# Patient Record
Sex: MALE | Race: WHITE | NOT HISPANIC OR LATINO | Employment: OTHER | URBAN - METROPOLITAN AREA
[De-identification: names, ages, dates, MRNs, and addresses within clinical notes are randomized per-mention and may not be internally consistent; named-entity substitution may affect disease eponyms.]

---

## 2017-03-01 ENCOUNTER — HOSPITAL ENCOUNTER (OUTPATIENT)
Dept: LAB | Facility: HOSPITAL | Age: 62
Discharge: HOME | End: 2017-03-01
Attending: INTERNAL MEDICINE | Admitting: INTERNAL MEDICINE

## 2017-03-01 LAB
ALBUMIN SERPL-MCNC: 4 G/DL (ref 3.5–4.8)
ALBUMIN/GLOB SERPL: 1.5 {RATIO} (ref 1–1.7)
ALP SERPL-CCNC: 47 IU/L (ref 32–91)
ALT SERPL-CCNC: 55 IU/L (ref 17–63)
ANION GAP SERPL CALC-SCNC: 18.1 MMOL/L (ref 10–20)
AST SERPL-CCNC: 29 IU/L (ref 15–41)
BILIRUB SERPL-MCNC: 0.6 MG/DL (ref 0.3–1.2)
BUN SERPL-MCNC: 15 MG/DL (ref 8–20)
BUN/CREAT SERPL: 12.5 (ref 6.2–20.3)
CALCIUM SERPL-MCNC: 9.4 MG/DL (ref 8.9–10.3)
CHLORIDE SERPL-SCNC: 101 MMOL/L (ref 101–111)
CHOLEST SERPL-MCNC: 171 MG/DL
CHOLEST/HDLC SERPL: 3.9 {RATIO}
CK SERPL-CCNC: 111 IU/L (ref 49–397)
CONV CO2: 26 MMOL/L (ref 22–32)
CONV LDL CHOLESTEROL DIRECT: 120 MG/DL (ref 0–100)
CONV TOTAL PROTEIN: 6.7 G/DL (ref 6.1–7.9)
CREAT UR-MCNC: 1.2 MG/DL (ref 0.7–1.2)
GLOBULIN UR ELPH-MCNC: 2.7 G/DL (ref 2.5–3.8)
GLUCOSE SERPL-MCNC: 103 MG/DL (ref 65–99)
HDLC SERPL-MCNC: 43 MG/DL
LDLC/HDLC SERPL: 2.8 {RATIO}
LIPID INTERPRETATION: ABNORMAL
POTASSIUM SERPL-SCNC: 5.1 MMOL/L (ref 3.6–5.1)
PSA SERPL-MCNC: 0.43 NG/ML (ref 0–4)
SODIUM SERPL-SCNC: 140 MMOL/L (ref 136–144)
TRIGL SERPL-MCNC: 92 MG/DL
VLDLC SERPL CALC-MCNC: 7.7 MG/DL

## 2018-10-05 ENCOUNTER — HOSPITAL ENCOUNTER (OUTPATIENT)
Dept: OTHER | Facility: HOSPITAL | Age: 63
Discharge: HOME | End: 2018-10-05
Attending: FAMILY MEDICINE | Admitting: FAMILY MEDICINE

## 2018-10-19 ENCOUNTER — CONVERSION ENCOUNTER (OUTPATIENT)
Dept: FAMILY MEDICINE CLINIC | Facility: CLINIC | Age: 63
End: 2018-10-19

## 2018-10-19 LAB
ALBUMIN SERPL-MCNC: 4 G/DL (ref 3.6–5.1)
ALP SERPL-CCNC: 51 U/L (ref 40–115)
AST SERPL-CCNC: 24 U/L (ref 10–35)
BILIRUB SERPL-MCNC: 0.7 MG/DL (ref 0.2–1.2)
BUN SERPL-MCNC: 18 MG/DL (ref 7–25)
BUN/CREAT SERPL: 15 (CALC) (ref 6–22)
CALCIUM SERPL-MCNC: 9 MG/DL (ref 8.6–10.2)
CHLORIDE SERPL-SCNC: 100 MMOL/L (ref 98–110)
CHOLEST SERPL-MCNC: 130 MG/DL (ref 125–200)
CONV CO2: 27 MMOL/L (ref 21–33)
CONV TOTAL PROTEIN: 6.7 G/DL (ref 6.2–8.3)
CREAT UR-MCNC: 1.2 MG/DL (ref 0.76–1.46)
GLOBULIN UR ELPH-MCNC: 2.7 MG/DL (ref 2.1–3.7)
GLUCOSE UR QL: 111 MG/DL (ref 65–99)
HDLC SERPL-MCNC: 40 MG/DL
INSULIN SERPL-ACNC: 1.5 (CALC) (ref 1–2.1)
LDLC SERPL CALC-MCNC: 71 MG/DL
POTASSIUM SERPL-SCNC: 4.7 MMOL/L (ref 3.5–5.3)
SODIUM SERPL-SCNC: 137 MMOL/L (ref 135–146)
TRIGL SERPL-MCNC: 97 MG/DL

## 2019-02-05 ENCOUNTER — HOSPITAL ENCOUNTER (OUTPATIENT)
Dept: GENERAL RADIOLOGY | Facility: HOSPITAL | Age: 64
Discharge: HOME | End: 2019-02-05
Attending: NEUROLOGICAL SURGERY | Admitting: NEUROLOGICAL SURGERY

## 2019-02-13 ENCOUNTER — HOSPITAL ENCOUNTER (OUTPATIENT)
Dept: PHYSICAL THERAPY | Facility: HOSPITAL | Age: 64
Setting detail: RECURRING SERIES
Discharge: STILL ADMITTED OR ADMINISTRATIVE | End: 2019-03-12
Attending: NEUROLOGICAL SURGERY | Admitting: NEUROLOGICAL SURGERY

## 2019-03-14 ENCOUNTER — HOSPITAL ENCOUNTER (OUTPATIENT)
Dept: CARDIOLOGY | Facility: HOSPITAL | Age: 64
Discharge: HOME | End: 2019-03-14
Attending: INTERNAL MEDICINE | Admitting: INTERNAL MEDICINE

## 2019-04-05 ENCOUNTER — HOSPITAL ENCOUNTER (OUTPATIENT)
Dept: OTHER | Facility: HOSPITAL | Age: 64
Discharge: HOME | End: 2019-04-05
Attending: FAMILY MEDICINE | Admitting: FAMILY MEDICINE

## 2019-04-10 ENCOUNTER — HOSPITAL ENCOUNTER (OUTPATIENT)
Dept: PHYSICAL THERAPY | Facility: HOSPITAL | Age: 64
Setting detail: RECURRING SERIES
Discharge: HOME | End: 2019-06-10
Attending: NEUROLOGICAL SURGERY | Admitting: NEUROLOGICAL SURGERY

## 2019-05-16 ENCOUNTER — ON CAMPUS - OUTPATIENT (AMBULATORY)
Dept: URBAN - METROPOLITAN AREA HOSPITAL 2 | Facility: HOSPITAL | Age: 64
End: 2019-05-16
Payer: COMMERCIAL

## 2019-05-16 VITALS
SYSTOLIC BLOOD PRESSURE: 126 MMHG | OXYGEN SATURATION: 98 % | HEART RATE: 75 BPM | RESPIRATION RATE: 17 BRPM | SYSTOLIC BLOOD PRESSURE: 148 MMHG | SYSTOLIC BLOOD PRESSURE: 144 MMHG | HEART RATE: 83 BPM | OXYGEN SATURATION: 95 % | SYSTOLIC BLOOD PRESSURE: 130 MMHG | HEART RATE: 85 BPM | DIASTOLIC BLOOD PRESSURE: 79 MMHG | DIASTOLIC BLOOD PRESSURE: 92 MMHG | HEART RATE: 80 BPM | DIASTOLIC BLOOD PRESSURE: 93 MMHG | OXYGEN SATURATION: 97 % | SYSTOLIC BLOOD PRESSURE: 153 MMHG | DIASTOLIC BLOOD PRESSURE: 82 MMHG | DIASTOLIC BLOOD PRESSURE: 72 MMHG | DIASTOLIC BLOOD PRESSURE: 90 MMHG | DIASTOLIC BLOOD PRESSURE: 88 MMHG | RESPIRATION RATE: 12 BRPM | HEART RATE: 94 BPM | WEIGHT: 253 LBS | SYSTOLIC BLOOD PRESSURE: 125 MMHG | OXYGEN SATURATION: 92 % | RESPIRATION RATE: 18 BRPM | DIASTOLIC BLOOD PRESSURE: 87 MMHG | OXYGEN SATURATION: 96 % | HEART RATE: 90 BPM | TEMPERATURE: 97.5 F | SYSTOLIC BLOOD PRESSURE: 141 MMHG | HEART RATE: 81 BPM | SYSTOLIC BLOOD PRESSURE: 152 MMHG | SYSTOLIC BLOOD PRESSURE: 134 MMHG | OXYGEN SATURATION: 94 % | DIASTOLIC BLOOD PRESSURE: 83 MMHG | HEIGHT: 67 IN | RESPIRATION RATE: 16 BRPM

## 2019-05-16 DIAGNOSIS — R13.10 DYSPHAGIA, UNSPECIFIED: ICD-10-CM

## 2019-05-16 DIAGNOSIS — K57.30 DIVERTICULOSIS OF LARGE INTESTINE WITHOUT PERFORATION OR ABS: ICD-10-CM

## 2019-05-16 DIAGNOSIS — Z12.11 ENCOUNTER FOR SCREENING FOR MALIGNANT NEOPLASM OF COLON: ICD-10-CM

## 2019-05-16 DIAGNOSIS — K21.9 GASTRO-ESOPHAGEAL REFLUX DISEASE WITHOUT ESOPHAGITIS: ICD-10-CM

## 2019-05-16 PROCEDURE — 43235 EGD DIAGNOSTIC BRUSH WASH: CPT | Performed by: INTERNAL MEDICINE

## 2019-05-16 PROCEDURE — 43450 DILATE ESOPHAGUS 1/MULT PASS: CPT | Performed by: INTERNAL MEDICINE

## 2019-05-16 PROCEDURE — 45378 DIAGNOSTIC COLONOSCOPY: CPT | Performed by: INTERNAL MEDICINE

## 2019-11-29 DIAGNOSIS — I10 HYPERTENSION, UNSPECIFIED TYPE: Primary | ICD-10-CM

## 2019-12-04 RX ORDER — CARVEDILOL 25 MG/1
TABLET ORAL
Qty: 180 TABLET | Refills: 0 | Status: SHIPPED | OUTPATIENT
Start: 2019-12-04 | End: 2019-12-06 | Stop reason: SDUPTHER

## 2019-12-05 DIAGNOSIS — I10 HYPERTENSION, UNSPECIFIED TYPE: ICD-10-CM

## 2019-12-05 DIAGNOSIS — I10 HTN (HYPERTENSION), BENIGN: Primary | ICD-10-CM

## 2019-12-05 NOTE — TELEPHONE ENCOUNTER
Needs a refill on carvedilol 25 mg and enalapril 5 mg to be sent to [facility] in [place].

## 2019-12-06 PROBLEM — J30.9 ALLERGIC RHINITIS: Status: ACTIVE | Noted: 2019-12-06

## 2019-12-06 PROBLEM — I21.9: Status: ACTIVE | Noted: 2019-12-06

## 2019-12-06 PROBLEM — H81.90 VERTIGINOUS SYNDROME AND LABYRINTHINE DISORDER: Status: ACTIVE | Noted: 2019-12-06

## 2019-12-06 PROBLEM — D22.9 NEVUS: Status: ACTIVE | Noted: 2019-12-06

## 2019-12-06 PROBLEM — Z12.11 COLON CANCER SCREENING: Status: ACTIVE | Noted: 2019-12-06

## 2019-12-06 PROBLEM — Z87.891 HISTORY OF TOBACCO USE: Status: ACTIVE | Noted: 2019-12-06

## 2019-12-06 PROBLEM — I10 HTN (HYPERTENSION), BENIGN: Status: ACTIVE | Noted: 2019-12-06

## 2019-12-06 PROBLEM — E66.3 OVERWEIGHT: Status: ACTIVE | Noted: 2019-12-06

## 2019-12-06 PROBLEM — Z98.61 CORONARY ANGIOPLASTY STATUS: Status: ACTIVE | Noted: 2019-12-06

## 2019-12-06 PROBLEM — M79.10 MYALGIA: Status: ACTIVE | Noted: 2019-12-06

## 2019-12-06 PROBLEM — M51.9 LUMBAR DISC DISEASE: Status: ACTIVE | Noted: 2019-12-06

## 2019-12-06 PROBLEM — K21.9 GERD (GASTROESOPHAGEAL REFLUX DISEASE): Status: ACTIVE | Noted: 2019-12-06

## 2019-12-06 PROBLEM — M54.41 ACUTE RIGHT-SIDED LOW BACK PAIN WITH RIGHT-SIDED SCIATICA: Status: ACTIVE | Noted: 2019-12-06

## 2019-12-06 PROBLEM — Z00.01 ENCOUNTER FOR ANNUAL GENERAL MEDICAL EXAMINATION WITH ABNORMAL FINDINGS IN ADULT: Status: ACTIVE | Noted: 2019-12-06

## 2019-12-06 PROBLEM — M25.551 RIGHT HIP PAIN: Status: ACTIVE | Noted: 2019-12-06

## 2019-12-06 PROBLEM — E78.5 HYPERLIPIDEMIA: Status: ACTIVE | Noted: 2019-12-06

## 2019-12-06 PROBLEM — B19.10 HEPATITIS B: Status: ACTIVE | Noted: 2019-12-06

## 2019-12-06 PROBLEM — Z12.5 SCREENING PSA (PROSTATE SPECIFIC ANTIGEN): Status: ACTIVE | Noted: 2019-12-06

## 2019-12-06 PROBLEM — R60.9 EDEMA: Status: ACTIVE | Noted: 2017-03-02

## 2019-12-06 PROBLEM — E55.9 VITAMIN D DEFICIENCY: Status: ACTIVE | Noted: 2019-12-06

## 2019-12-06 PROBLEM — G47.33 OBSTRUCTIVE SLEEP APNEA: Status: ACTIVE | Noted: 2019-12-06

## 2019-12-06 PROBLEM — B02.9 HERPES ZOSTER: Status: ACTIVE | Noted: 2019-12-06

## 2019-12-06 PROBLEM — T14.8XXA HEMATOMA: Status: ACTIVE | Noted: 2019-12-06

## 2019-12-06 RX ORDER — ENALAPRIL MALEATE 5 MG/1
TABLET ORAL
COMMUNITY
Start: 2018-09-13 | End: 2019-12-06 | Stop reason: SDUPTHER

## 2019-12-09 DIAGNOSIS — I10 HYPERTENSION, UNSPECIFIED TYPE: ICD-10-CM

## 2019-12-09 RX ORDER — CARVEDILOL 25 MG/1
25 TABLET ORAL 2 TIMES DAILY
Qty: 180 TABLET | Refills: 0 | Status: SHIPPED | OUTPATIENT
Start: 2019-12-09 | End: 2020-01-27 | Stop reason: SDUPTHER

## 2019-12-09 RX ORDER — ENALAPRIL MALEATE 5 MG/1
5 TABLET ORAL DAILY
Qty: 90 TABLET | Refills: 0 | Status: SHIPPED | OUTPATIENT
Start: 2019-12-09 | End: 2019-12-16

## 2019-12-09 RX ORDER — ENALAPRIL MALEATE 5 MG/1
5 TABLET ORAL DAILY
Qty: 30 TABLET | Refills: 0 | Status: SHIPPED | OUTPATIENT
Start: 2019-12-09 | End: 2019-12-09 | Stop reason: SDUPTHER

## 2019-12-09 RX ORDER — ENALAPRIL MALEATE 5 MG/1
5 TABLET ORAL 2 TIMES DAILY
Qty: 180 TABLET | Refills: 0 | Status: SHIPPED | OUTPATIENT
Start: 2019-12-09 | End: 2019-12-16

## 2019-12-16 ENCOUNTER — OFFICE VISIT (OUTPATIENT)
Dept: CARDIOLOGY | Facility: CLINIC | Age: 64
End: 2019-12-16

## 2019-12-16 VITALS
OXYGEN SATURATION: 98 % | BODY MASS INDEX: 42.33 KG/M2 | HEIGHT: 66 IN | SYSTOLIC BLOOD PRESSURE: 118 MMHG | DIASTOLIC BLOOD PRESSURE: 70 MMHG | WEIGHT: 263.4 LBS | HEART RATE: 70 BPM

## 2019-12-16 DIAGNOSIS — I10 ESSENTIAL HYPERTENSION: ICD-10-CM

## 2019-12-16 DIAGNOSIS — I25.10 CAD S/P PERCUTANEOUS CORONARY ANGIOPLASTY: Primary | ICD-10-CM

## 2019-12-16 DIAGNOSIS — Z98.61 CAD S/P PERCUTANEOUS CORONARY ANGIOPLASTY: Primary | ICD-10-CM

## 2019-12-16 DIAGNOSIS — E78.5 DYSLIPIDEMIA: ICD-10-CM

## 2019-12-16 PROCEDURE — 93000 ELECTROCARDIOGRAM COMPLETE: CPT | Performed by: INTERNAL MEDICINE

## 2019-12-16 PROCEDURE — 99213 OFFICE O/P EST LOW 20 MIN: CPT | Performed by: INTERNAL MEDICINE

## 2019-12-16 RX ORDER — CHOLECALCIFEROL (VITAMIN D3) 50 MCG
1 TABLET ORAL 2 TIMES DAILY
COMMUNITY
Start: 2015-08-11

## 2019-12-16 RX ORDER — ROSUVASTATIN CALCIUM 40 MG/1
1 TABLET, COATED ORAL EVERY 24 HOURS
COMMUNITY
Start: 2015-08-11 | End: 2020-01-27 | Stop reason: SDUPTHER

## 2019-12-16 RX ORDER — NITROGLYCERIN 0.4 MG/1
1 TABLET SUBLINGUAL AS NEEDED
COMMUNITY
Start: 2015-08-11 | End: 2020-01-27 | Stop reason: ALTCHOICE

## 2019-12-16 RX ORDER — CYCLOBENZAPRINE HCL 10 MG
1 TABLET ORAL AS NEEDED
COMMUNITY
Start: 2018-12-10 | End: 2020-01-27 | Stop reason: SDUPTHER

## 2019-12-16 RX ORDER — OFLOXACIN 3 MG/ML
1 SOLUTION/ DROPS OPHTHALMIC AS NEEDED
Refills: 0 | COMMUNITY
Start: 2019-10-15 | End: 2020-08-07

## 2019-12-16 RX ORDER — ASPIRIN 325 MG
1 TABLET ORAL DAILY
COMMUNITY
Start: 2015-08-11 | End: 2023-01-19 | Stop reason: SDUPTHER

## 2019-12-16 NOTE — PROGRESS NOTES
Subjective:     Encounter Date:12/16/2019      Patient ID: John Wilburn is a 64 y.o. male.    Chief Complaint: 1 yr follow-up CAD  History of Present Illness      This is a 64-year with PMH of    #  CAD,h/o MI, multiple PCIs in [place] in the past, NSTEMI and MIGUEL to RCA 6/18/15 and LCX 06/24/2015, cath 7/8/2015 Patent stents in RCA and LCX,Borderline disease in ramus intermedius and moderate disease in proximal LAD    #.  Hypertension,  #  dyslipidemia,  Lipitor intolerant  #.  PLAVIX  RESISTANT  #.  obesity, hyperglycemia with normal hemoglobin A1c, 5.6 on  7/2015  #.   Former smoker quit in 1990  #    positive family history of heart disease in father and mother  #    allergy/intolerance  to Lipitor Darvocet and Vicodin    Here for  follow-up. denies any chest pain shortness of breath lightheadedness dizziness loss of consciousness.  Patient is complaining of fatigue with no aggravating or relieving factors and left arm numbness.  No aggravating or relieving factor.  Patient  had labs done 10/19/2018 which showed cholesterol 130 triglycerides 77 HDL 40 LDL 71 CMP was unremarkable. Labs from 4/5/2019 revealed normal CMP TSH 3.74 cholesterol 132 HDL 45 LDL 67 triglycerides 114    Patient's arterial blood pressure is 118/70, heart rate 70 bpm, O2 sat of 98% on room air.  Denies any blurred vision headaches.         ASSESSMENT:  # dyslipidemia  # edema  #  CAD history of MI and PCI  #  obesity, glucose intolerance,   # hypertension,      PLAN:  Reviewed EKG results with patient  Will continue statin to Crestor 10 mg   reheck labs  including lipid profile, CK, AST before visit.  Reviewed labs with patient   reviewed risk benefits alternatives of  medications patient is taking   counseled on diet exercise weight loss        Assessment:          Diagnosis Plan   1. CAD S/P percutaneous coronary angioplasty  Lipid Panel    Comprehensive Metabolic Panel    CK   2. Essential hypertension  Lipid Panel    Comprehensive  Metabolic Panel    CK   3. Dyslipidemia  Lipid Panel    Comprehensive Metabolic Panel    CK          Plan:         Past Medical History:  Past Medical History:   Diagnosis Date   • Acute bronchitis    • Acute myocardial infarction, subsequent episode of care (CMS/[facility])    • Acute non-recurrent frontal sinusitis    • Acute right-sided low back pain with right-sided sciatica     Impression: persistent, long standing back pain, worse, with rle radiculopathy, h/o vertebral fx, refractory to pt xray with severe degenerative changes l5/ s1 check mri, referrall to dr amarilys arias 20 minutes bid nsaids Rehabilitation exercises discussed. continue pt   • Allergic rhinitis    • Allergy to poison ivy     Impression: Due to the wide spread rash he was started on Prednisone per pt request. He was advised to RTC if his symptoms did not improve.   • Annual visit for general adult medical examination with abnormal findings     Impression: doing well, vaccines current Age specific anticipatory guidance and warning signs discussed. Diet, exercise, and lifestyle modification discussed. Safety, seatbelts, and routine screening examinations discussed. Discussed self-examinations.   • Bronchitis    • Cellulitis of buttock     Impression: possibly 2nd insect bite Topical care discussed. Discussed possible necessity of I and D. Call / return if fever, worsening symptoms.   • Chest pain     Impression: atypical, improved / resolved currently possibly 2nd nausea /gi upset /viral uri serial ekg's, troponin's normal followed by cardiology in [place], he reports negative treadmill cardiolyte 12/11 d/c to home, Follow up 2 to 3 days. Follow up with cardiology planned consider repeat stress test if chest pain on exertion, worsening symptoms.   • Colon cancer screening     Impression: has a colonoscopy, will get records   • Conjunctivitis     Impression: viral Topical care discussed. wash hands frequently   • Coronary atherosclerosis     Impression:  h/o stent 2003, 2016 followed by meng, had recent neg stress test, will get records h/o normal carptid doppler per her report, will get records continue coreg, statin, effient continue risk factor reduction recommend cardiology Follow up he states h3 will consider   • GERD (gastroesophageal reflux disease)    • Hematoma     Impression: hand, much improved xray neg for fracture per ed Signs and symptoms of infecton discussed. Call / return if worsening symptoms.   • Hepatitis B     Impression: h/o, recheck levels   • Herpes zoster     Impression: topical care discussed cover with antibiotics Follow up for zostavax   • History of tobacco use    • HTN (hypertension), benign     Impression: good control Discussed low sodium diet, lifestyle modification. Follow up recheck   • Hyperlipidemia     Impression: followed by Roxana improved tolerating crestor rx, ldl to 71, + aggressive ldl target considering cad recheck   • Lumbar disc disease     Impression: followed by Nick Ashton undergoing epidual injxn   • Malaise and fatigue     Impression: check vitamin levels h/o low t do not recommend replacement consider age enedelia under good cobtrol consider wellbutrin Follow up recheck Call / return if worsening symptoms.   • Myalgia     Impression: possibly 2nd crestor, hold x 1 to 2 mos risk/benefit RX discussed, considering restart possibly at lower dose   • Nevus     Impression: path shows benign lentigo Discussed skin care, use of sun block and protective clothing.   • Obesity    • Obesity, morbid, BMI 40.0-49.9 (CMS/HCC)     DOCUMENTATION OF FOLLOW-UP PLAN FOR PATIENT WITH BMI ABOVE NORMAL ()   • Overweight (BMI 25.0-29.9)     Impression: Discussed diet, exercise, lifestyle modification. Follow up [id number]   • Pruritus    • Right hip pain     Impression: check xray   • Screening for depression     Negative Depression Screening (4 or less) ()   • Screening PSA (prostate specific antigen)     Impression: psa normal  / damián normal   • Sinusitis, bacterial     Impression: He was prescribed Cipro and Tessalon perles. Increase fluids. Tylenol/motrin for pain or fever. Medication and medication adverse effects discussed. Follow-up 5-7 days for reevaluation if not improved or sooner if needed.   • Skin lesion     Impression: rec resection / derm ref sched   • Sleep apnea, obstructive     Story: on CPAP   • URI (upper respiratory infection)     Impression: Increase fluid intake. Mucinex Call / return if fever, respiratory difficulty, worsening symptoms.   • Vertiginous syndrome and labyrinthine disorder     Impression: Differential diagnosis discussed. Follow-up in 2 weeks if not better. Follow-up sooner for worsening symptoms or for any concerns. Zyrtec as directed.   • Vertigo     Impression: likely secondary to sinusitis with eustachian tube dysfunction, rx in progress ddx includes vestibular neuritis, cover with prednisone Follow up recheck Consider imaging if persistent symptoms.   • Vitamin D deficiency      Past Surgical History:  Past Surgical History:   Procedure Laterality Date   • CORONARY STENT PLACEMENT  2003      Allergies:  Allergies   Allergen Reactions   • Atorvastatin Hives   • Propoxyphene Hives     Home Meds:  Current Meds:     Current Outpatient Medications:   •  aspirin 325 MG tablet, Take 1 tablet by mouth Daily., Disp: , Rfl:   •  carvedilol (COREG) 25 MG tablet, Take 1 tablet by mouth 2 (Two) Times a Day., Disp: 180 tablet, Rfl: 0  •  Cholecalciferol (VITAMIN D) 50 MCG (2000 UT) tablet, Take 1 tablet by mouth Daily., Disp: , Rfl:   •  cyclobenzaprine (FLEXERIL) 10 MG tablet, Take 1 tablet by mouth As Needed., Disp: , Rfl:   •  nitroglycerin (NITROSTAT) 0.4 MG SL tablet, Place 1 tablet under the tongue As Needed., Disp: , Rfl:   •  ofloxacin (OCUFLOX) 0.3 % ophthalmic solution, Administer 1 drop into the left eye As Needed., Disp: , Rfl: 0  •  rosuvastatin (CRESTOR) 40 MG tablet, Take 1 tablet by mouth Daily.,  Disp: , Rfl:   Social History:   Social History     Tobacco Use   • Smoking status: Former Smoker     Last attempt to quit: 1990     Years since quittin.9   • Smokeless tobacco: Never Used   Substance Use Topics   • Alcohol use: Yes     Comment: Occasional      Family History:  Family History   Problem Relation Age of Onset   • Hypertension Mother    • Diabetes Mother    • Heart attack Father    • No Known Problems Sister    • No Known Problems Brother    • No Known Problems Maternal Aunt    • No Known Problems Maternal Uncle    • No Known Problems Paternal Aunt    • No Known Problems Paternal Uncle    • No Known Problems Maternal Grandmother    • No Known Problems Maternal Grandfather    • No Known Problems Paternal Grandmother    • No Known Problems Paternal Grandfather    • No Known Problems Other    • Anemia Neg Hx    • Arrhythmia Neg Hx    • Asthma Neg Hx    • Clotting disorder Neg Hx    • Fainting Neg Hx    • Heart disease Neg Hx    • Heart failure Neg Hx    • Hyperlipidemia Neg Hx         The following portions of the patient's history were reviewed and updated as appropriate: allergies, current medications, past family history, past medical history, past social history, past surgical history and problem list.    Review of Systems   Constitution: Negative for fever and malaise/fatigue.   HENT: Negative for congestion and hearing loss.    Eyes: Negative for double vision and visual disturbance.   Cardiovascular: Negative for chest pain, claudication, dyspnea on exertion, leg swelling and syncope.   Respiratory: Negative for cough and shortness of breath.    Endocrine: Negative for cold intolerance.   Skin: Negative for color change and rash.   Musculoskeletal: Negative for arthritis and joint pain.   Gastrointestinal: Negative for abdominal pain and heartburn.   Genitourinary: Negative for hematuria.   Neurological: Negative for excessive daytime sleepiness and dizziness.   Psychiatric/Behavioral:  "Negative for depression. The patient is not nervous/anxious.    All other systems reviewed and are negative.        ECG 12 Lead  Date/Time: 12/16/2019 12:53 PM  Performed by: Sigifredo Schmidt MD  Authorized by: Sigifredo Schmidt MD   Comparison: compared with previous ECG from 12/19/2018  Comparison to previous ECG: EKG done today reviewed by me shows sinus rhythm with rate of 70 bpm with old inferior wall MI unchanged from previous EKG from 12/19/2018                 Objective:     Physical Exam  /70 (BP Location: Left arm, Patient Position: Sitting, Cuff Size: Adult)   Pulse 70   Ht 167.6 cm (66\")   Wt 119 kg (263 lb 6.4 oz)   SpO2 98%   BMI 42.51 kg/m²   General:  Appears in no acute distress  Eyes: Sclera is anicteric,  conjunctiva is clear   HEENT:  No JVD. Thyroid not visibly enlarged. No mucosal pallor or cyanosis  Respiratory: Respirations regular and unlabored at rest.  Bilaterally good breath sounds, with good air entry in all fields. No crackles, rubs or wheezes auscultated  Cardiovascular: S1,S2 Regular rate and rhythm. No murmur, rub or gallop auscultated. No pretibial pitting edema  Gastrointestinal: Abdomen soft, flat, non tender. Bowel sounds present.   Musculoskeletal:  No abnormal movements  Extremities: No digital clubbing or cyanosis  Skin: Color pink. Skin warm and dry to touch. No rashes  No xanthoma  Neuro: Alert and awake, no lateralizing deficits appreciated    Lab Reviewed:                  "

## 2020-01-17 DIAGNOSIS — I10 HTN (HYPERTENSION), BENIGN: Primary | ICD-10-CM

## 2020-01-17 DIAGNOSIS — I10 HTN (HYPERTENSION), BENIGN: ICD-10-CM

## 2020-01-17 RX ORDER — ENALAPRIL MALEATE 5 MG/1
5 TABLET ORAL 2 TIMES DAILY
Qty: 60 TABLET | Refills: 0 | Status: SHIPPED | OUTPATIENT
Start: 2020-01-17 | End: 2020-01-27 | Stop reason: SDUPTHER

## 2020-01-20 RX ORDER — ENALAPRIL MALEATE 5 MG/1
TABLET ORAL
Qty: 180 TABLET | OUTPATIENT
Start: 2020-01-20

## 2020-01-27 ENCOUNTER — OFFICE VISIT (OUTPATIENT)
Dept: FAMILY MEDICINE CLINIC | Facility: CLINIC | Age: 65
End: 2020-01-27

## 2020-01-27 VITALS
BODY MASS INDEX: 42.3 KG/M2 | SYSTOLIC BLOOD PRESSURE: 128 MMHG | HEART RATE: 82 BPM | HEIGHT: 66 IN | RESPIRATION RATE: 19 BRPM | OXYGEN SATURATION: 95 % | TEMPERATURE: 98.6 F | DIASTOLIC BLOOD PRESSURE: 80 MMHG | WEIGHT: 263.2 LBS

## 2020-01-27 DIAGNOSIS — I10 HYPERTENSION, UNSPECIFIED TYPE: ICD-10-CM

## 2020-01-27 DIAGNOSIS — Z12.11 COLON CANCER SCREENING: ICD-10-CM

## 2020-01-27 DIAGNOSIS — G47.33 OBSTRUCTIVE SLEEP APNEA: ICD-10-CM

## 2020-01-27 DIAGNOSIS — E66.3 OVERWEIGHT: ICD-10-CM

## 2020-01-27 DIAGNOSIS — Z12.5 SCREENING PSA (PROSTATE SPECIFIC ANTIGEN): ICD-10-CM

## 2020-01-27 DIAGNOSIS — Z87.891 HISTORY OF TOBACCO USE: ICD-10-CM

## 2020-01-27 DIAGNOSIS — M54.41 ACUTE RIGHT-SIDED LOW BACK PAIN WITH RIGHT-SIDED SCIATICA: ICD-10-CM

## 2020-01-27 DIAGNOSIS — R07.9 CHEST PAIN, UNSPECIFIED TYPE: ICD-10-CM

## 2020-01-27 DIAGNOSIS — Z00.01 ENCOUNTER FOR ANNUAL GENERAL MEDICAL EXAMINATION WITH ABNORMAL FINDINGS IN ADULT: Primary | ICD-10-CM

## 2020-01-27 DIAGNOSIS — E78.2 MIXED HYPERLIPIDEMIA: ICD-10-CM

## 2020-01-27 DIAGNOSIS — I10 HTN (HYPERTENSION), BENIGN: ICD-10-CM

## 2020-01-27 PROCEDURE — 99213 OFFICE O/P EST LOW 20 MIN: CPT | Performed by: FAMILY MEDICINE

## 2020-01-27 PROCEDURE — 99386 PREV VISIT NEW AGE 40-64: CPT | Performed by: FAMILY MEDICINE

## 2020-01-27 RX ORDER — NITROGLYCERIN 400 UG/1
1 SPRAY ORAL
COMMUNITY
End: 2020-01-27 | Stop reason: ALTCHOICE

## 2020-01-27 RX ORDER — ROSUVASTATIN CALCIUM 20 MG/1
TABLET, COATED ORAL
COMMUNITY
Start: 2019-12-30 | End: 2020-01-27 | Stop reason: SDUPTHER

## 2020-01-27 RX ORDER — CARVEDILOL 25 MG/1
25 TABLET ORAL 2 TIMES DAILY
Qty: 180 TABLET | Refills: 2 | Status: SHIPPED | OUTPATIENT
Start: 2020-01-27 | End: 2020-10-28

## 2020-01-27 RX ORDER — CYCLOBENZAPRINE HCL 10 MG
10 TABLET ORAL NIGHTLY PRN
Qty: 90 TABLET | Refills: 2 | Status: SHIPPED | OUTPATIENT
Start: 2020-01-27 | End: 2020-11-04

## 2020-01-27 RX ORDER — ROSUVASTATIN CALCIUM 20 MG/1
20 TABLET, COATED ORAL DAILY
Qty: 90 TABLET | Refills: 2 | Status: SHIPPED | OUTPATIENT
Start: 2020-01-27 | End: 2020-10-26

## 2020-01-27 RX ORDER — ENALAPRIL MALEATE 5 MG/1
5 TABLET ORAL 2 TIMES DAILY
Qty: 180 TABLET | Refills: 2 | Status: SHIPPED | OUTPATIENT
Start: 2020-01-27 | End: 2020-11-16

## 2020-01-27 RX ORDER — NITROGLYCERIN 400 UG/1
1 SPRAY ORAL
Qty: 1 EACH | Refills: 1 | Status: SHIPPED | OUTPATIENT
Start: 2020-01-27 | End: 2021-07-02 | Stop reason: SDUPTHER

## 2020-01-27 NOTE — PROGRESS NOTES
Subjective   John Wilburn is a 64 y.o. male.     Chief Complaint   Patient presents with   • Annual Exam   • Hypertension   • Hyperlipidemia       The patient is here: to discuss health maintenance and disease prevention to follow up on multiple medical problems.  Last comprehensive physical was on 4/12/2019.  Previous physical was performed by  Kian Weston MD  Overall has: minimal activity with work/home activities, good appetite, feels well with minor complaints, decreased energy level and is sleeping well. Nutrition: balanced diet and eating a variety of foods. Last tetanus shot was less than 5 years ago. Patient's last colonoscopy was: 5/16/2019. Patient's last PSA was: 4/5/2019 result 0.3 Patient's last stress test was: 3/21/2019 with     Hyperlipidemia   This is a chronic problem. The current episode started more than 1 year ago. Pertinent negatives include no chest pain, focal weakness, myalgias or shortness of breath. Current antihyperlipidemic treatment includes statins. The current treatment provides mild improvement of lipids. There are no compliance problems.  Risk factors for coronary artery disease include dyslipidemia, hypertension, male sex and obesity.   Hypertension   This is a chronic problem. The current episode started more than 1 year ago. The problem is unchanged. Pertinent negatives include no anxiety, chest pain, neck pain, palpitations, shortness of breath or sweats. Risk factors for coronary artery disease include male gender and dyslipidemia. Current antihypertension treatment includes beta blockers and ACE inhibitors. The current treatment provides moderate improvement. There are no compliance problems.         Recent Hospitalizations:  No hospitalization(s) within the last year..  ccc      I personally reviewed and updated the patient's allergies, medications, problem list, and past medical, surgical, social, and family history.     Family History   Problem Relation Age  of Onset   • Hypertension Mother    • Diabetes Mother    • Heart attack Father    • No Known Problems Sister    • No Known Problems Brother    • No Known Problems Maternal Aunt    • No Known Problems Maternal Uncle    • No Known Problems Paternal Aunt    • No Known Problems Paternal Uncle    • No Known Problems Maternal Grandmother    • No Known Problems Maternal Grandfather    • No Known Problems Paternal Grandmother    • No Known Problems Paternal Grandfather    • No Known Problems Other    • Anemia Neg Hx    • Arrhythmia Neg Hx    • Asthma Neg Hx    • Clotting disorder Neg Hx    • Fainting Neg Hx    • Heart disease Neg Hx    • Heart failure Neg Hx    • Hyperlipidemia Neg Hx        Social History     Tobacco Use   • Smoking status: Former Smoker     Last attempt to quit: 1990     Years since quittin.0   • Smokeless tobacco: Never Used   Substance Use Topics   • Alcohol use: Yes     Comment: Occasional   • Drug use: No       Past Surgical History:   Procedure Laterality Date   • CORONARY STENT PLACEMENT     • EYE SURGERY  10/16/2019    repair detached retina       Patient Active Problem List   Diagnosis   • Acute myocardial infarction, subsequent episode of care (CMS/[facility])   • Acute right-sided low back pain with right-sided sciatica   • Allergic rhinitis   • Coronary angioplasty status   • Coronary atherosclerosis   • Edema   • Encounter for annual general medical examination with abnormal findings in adult   • Colon cancer screening   • Screening PSA (prostate specific antigen)   • GERD (gastroesophageal reflux disease)   • Hepatitis B   • Herpes zoster   • History of tobacco use   • HTN (hypertension), benign   • Hyperlipidemia   • Lumbar disc disease   • Hematoma   • Vertiginous syndrome and labyrinthine disorder   • Myalgia   • Nevus   • Obstructive sleep apnea   • Overweight   • Right hip pain   • Vitamin D deficiency         Current Outpatient Medications:   •  aspirin 325 MG tablet, Take 1  "tablet by mouth Daily., Disp: , Rfl:   •  carvedilol (COREG) 25 MG tablet, Take 1 tablet by mouth 2 (Two) Times a Day., Disp: 180 tablet, Rfl: 2  •  Cholecalciferol (VITAMIN D) 50 MCG (2000 UT) tablet, Take 1 tablet by mouth Daily., Disp: , Rfl:   •  cyclobenzaprine (FLEXERIL) 10 MG tablet, Take 1 tablet by mouth At Night As Needed for Muscle Spasms., Disp: 90 tablet, Rfl: 2  •  enalapril (VASOTEC) 5 MG tablet, Take 1 tablet by mouth 2 (Two) Times a Day., Disp: 180 tablet, Rfl: 2  •  nitroglycerin (NITROLINGUAL) 0.4 MG/SPRAY spray, Place 1 spray under the tongue Every 5 (Five) Minutes As Needed for Chest Pain., Disp: 1 each, Rfl: 1  •  ofloxacin (OCUFLOX) 0.3 % ophthalmic solution, Administer 1 drop into the left eye As Needed., Disp: , Rfl: 0  •  rosuvastatin (CRESTOR) 20 MG tablet, Take 1 tablet by mouth Daily., Disp: 90 tablet, Rfl: 2         Review of Systems   Constitutional: Negative for chills and diaphoresis.   HENT: Negative for trouble swallowing and voice change.    Eyes: Negative for visual disturbance.   Respiratory: Negative for shortness of breath.    Cardiovascular: Negative for chest pain and palpitations.   Gastrointestinal: Negative for abdominal pain and nausea.   Endocrine: Negative for polydipsia and polyphagia.   Genitourinary: Negative for hematuria.   Musculoskeletal: Negative for myalgias, neck pain and neck stiffness.   Skin: Negative for color change and pallor.   Allergic/Immunologic: Negative for immunocompromised state.   Neurological: Negative for focal weakness, seizures and syncope.   Hematological: Negative for adenopathy.   Psychiatric/Behavioral: Negative for sleep disturbance and suicidal ideas.       Objective   /80   Pulse 82   Temp 98.6 °F (37 °C)   Resp 19   Ht 167.6 cm (66\")   Wt 119 kg (263 lb 3.2 oz)   SpO2 95%   BMI 42.48 kg/m²   Wt Readings from Last 3 Encounters:   01/27/20 119 kg (263 lb 3.2 oz)   12/16/19 119 kg (263 lb 6.4 oz)   04/12/19 117 kg (259 lb) "     Physical Exam   Constitutional: He is oriented to person, place, and time. He appears well-developed and well-nourished.   HENT:   Head: Normocephalic.   Right Ear: Tympanic membrane, external ear and ear canal normal.   Left Ear: Tympanic membrane, external ear and ear canal normal.   Nose: Nose normal.   Eyes: Pupils are equal, round, and reactive to light. Conjunctivae, EOM and lids are normal.   Neck: No JVD present. Carotid bruit is not present. No tracheal deviation present. No thyromegaly present.   Cardiovascular: Normal rate, regular rhythm, normal heart sounds and intact distal pulses. Exam reveals no gallop and no friction rub.   No murmur heard.  Pulmonary/Chest: Effort normal and breath sounds normal. No stridor. He has no decreased breath sounds. He has no wheezes. He has no rales.   Abdominal: Soft. Bowel sounds are normal. He exhibits no distension and no mass. There is no tenderness. There is no rebound and no guarding. No hernia.   Lymphadenopathy:        Head (right side): No submental, no submandibular, no tonsillar, no preauricular, no posterior auricular and no occipital adenopathy present.        Head (left side): No submental, no submandibular, no tonsillar, no preauricular, no posterior auricular and no occipital adenopathy present.     He has no cervical adenopathy.   Neurological: He is alert and oriented to person, place, and time. He has normal strength and normal reflexes. No cranial nerve deficit or sensory deficit. Coordination and gait normal.   Skin: Skin is warm and dry. Turgor is normal. He is not diaphoretic. No pallor.       Recent Lab Results:          Lab Results   Component Value Date    CHOL 132 04/05/2019    TRIG 114 04/05/2019    HDL 45 04/05/2019    LDLHDL 2.8 03/01/2017     LDL Cholesterol    Date Value Ref Range Status   04/05/2019 67 NR Final   10/19/2018 71 <130 mg/dL Final   04/03/2018 113 <130 mg/dL Final     Lab Results   Component Value Date    PSA 0.3  04/05/2019    PSA 0.4 04/03/2018    PSA 0.43 03/01/2017     Lab Results   Component Value Date    WBC 8.0 04/05/2019    HGB 14.1 04/05/2019    HCT 42.2 04/05/2019    MCV 87.7 04/05/2019     04/05/2019     Lab Results   Component Value Date    TSH 3.74 04/05/2019     Lab Results   Component Value Date    GLUCOSE 104 (H) 12/19/2018    BUN 20 04/05/2019    CREATININE 1.28 (H) 04/05/2019    EGFRIFNONA 60 04/05/2019    EGFRIFAFRI 70 04/05/2019    BCR 16 04/05/2019    K 4.9 04/05/2019    CO2 29 04/05/2019    CALCIUM 9.3 04/05/2019    ALBUMIN 4.2 04/05/2019    LABIL2 1.5 03/01/2017    AST 21 04/05/2019    ALT 40 04/05/2019         Age-appropriate Screening Schedule:  Refer to the list below for future screening recommendations based on patient's age, sex and/or medical conditions. Orders for these recommended tests are listed in the plan section. The patient has been provided with a written plan.    Health Maintenance   Topic Date Due   • TDAP/TD VACCINES (1 - Tdap) 07/20/1968   • ZOSTER VACCINE (1 of 2) 07/20/2007   • LIPID PANEL  04/05/2020   • COLONOSCOPY  05/16/2029   • INFLUENZA VACCINE  Completed           Assessment/Plan      Physical.  Doing well, vaccines current.  Baby aspirin daily.  Discussed health maintenance, screening test, lifestyle modification.  Coronary artery disease.  Stable, followed by cardiology.  History of repeat stenting 2015.  Continue risk factor reduction.  Hypertension.  Good control.  Hyperlipidemia.  Improved on Crestor.  Follow-up recheck fasting labs.  Retinal detachment.  December/2019.  Improved status post surgery.  Followed by ophthalmology.  Lumbar disc disease.  Followed by pain management/neurosurgery.  Repeat course epidural as planned, consider radiofrequency ablation, surgery.  Prostate screening.  Follow-up check PSA.  GERD.  Stable on PPI.  EGD benign/dilation only 2019.  Colon cancer screening.  Colonoscopy benign 2019.  Repeat eval 10 years.  IRENE.  Stable on  CPAP.    Problem List Items Addressed This Visit        Unprioritized    Acute right-sided low back pain with right-sided sciatica    Relevant Medications    cyclobenzaprine (FLEXERIL) 10 MG tablet    Encounter for annual general medical examination with abnormal findings in adult - Primary    Colon cancer screening    Overview     has a colonoscopy, will get records         Screening PSA (prostate specific antigen)    Overview     psa normal / damián normal         History of tobacco use    HTN (hypertension), benign    Relevant Medications    carvedilol (COREG) 25 MG tablet    enalapril (VASOTEC) 5 MG tablet    Hyperlipidemia    Relevant Medications    rosuvastatin (CRESTOR) 20 MG tablet    Obstructive sleep apnea    Overweight      Other Visit Diagnoses     Hypertension, unspecified type        Relevant Medications    carvedilol (COREG) 25 MG tablet    enalapril (VASOTEC) 5 MG tablet    Chest pain, unspecified type        Relevant Medications    nitroglycerin (NITROLINGUAL) 0.4 MG/SPRAY spray              Expected course, medications, and adverse effects discussed.  Call or return if worsening or persistent symptoms.

## 2020-06-01 ENCOUNTER — OFFICE VISIT (OUTPATIENT)
Dept: FAMILY MEDICINE CLINIC | Facility: CLINIC | Age: 65
End: 2020-06-01

## 2020-06-01 VITALS
BODY MASS INDEX: 42.17 KG/M2 | RESPIRATION RATE: 18 BRPM | SYSTOLIC BLOOD PRESSURE: 121 MMHG | HEART RATE: 88 BPM | TEMPERATURE: 97.8 F | DIASTOLIC BLOOD PRESSURE: 73 MMHG | HEIGHT: 66 IN | WEIGHT: 262.4 LBS | OXYGEN SATURATION: 97 %

## 2020-06-01 DIAGNOSIS — M25.511 ACUTE PAIN OF BOTH SHOULDERS: ICD-10-CM

## 2020-06-01 DIAGNOSIS — M25.512 ACUTE PAIN OF BOTH SHOULDERS: ICD-10-CM

## 2020-06-01 DIAGNOSIS — M75.81 TENDINITIS OF RIGHT ROTATOR CUFF: ICD-10-CM

## 2020-06-01 DIAGNOSIS — E66.3 OVERWEIGHT: ICD-10-CM

## 2020-06-01 DIAGNOSIS — E78.2 MIXED HYPERLIPIDEMIA: ICD-10-CM

## 2020-06-01 DIAGNOSIS — I10 HTN (HYPERTENSION), BENIGN: Primary | ICD-10-CM

## 2020-06-01 PROCEDURE — 99213 OFFICE O/P EST LOW 20 MIN: CPT | Performed by: FAMILY MEDICINE

## 2020-06-01 PROCEDURE — 20610 DRAIN/INJ JOINT/BURSA W/O US: CPT | Performed by: FAMILY MEDICINE

## 2020-06-01 NOTE — PROGRESS NOTES
Subjective   John Wilburn is a 64 y.o. male.     Chief Complaint   Patient presents with   • Shoulder Pain       Hyperlipidemia   This is a chronic problem. The current episode started more than 1 year ago. Pertinent negatives include no chest pain, focal weakness, myalgias or shortness of breath. Current antihyperlipidemic treatment includes statins. The current treatment provides mild improvement of lipids. There are no compliance problems.  Risk factors for coronary artery disease include dyslipidemia, hypertension, male sex and obesity.   Hypertension   This is a chronic problem. The current episode started more than 1 year ago. The problem is unchanged. Pertinent negatives include no anxiety, chest pain, neck pain, palpitations, shortness of breath or sweats. Risk factors for coronary artery disease include male gender and dyslipidemia. Current antihypertension treatment includes beta blockers and ACE inhibitors. The current treatment provides moderate improvement. There are no compliance problems.    Shoulder Injury    Both shoulders are affected. The incident occurred 2 days ago. There was no injury mechanism. The pain is at a severity of 0/10. The patient is experiencing no pain. Associated symptoms include numbness and tingling. Pertinent negatives include no chest pain. The symptoms are aggravated by movement and overhead lifting. He has tried ice, heat, acetaminophen and NSAIDs for the symptoms. The treatment provided no relief.            I personally reviewed and updated the patient's allergies, medications, problem list, and past medical, surgical, social, and family history.     Family History   Problem Relation Age of Onset   • Hypertension Mother    • Diabetes Mother    • Heart attack Father    • No Known Problems Sister    • No Known Problems Brother    • No Known Problems Maternal Aunt    • No Known Problems Maternal Uncle    • No Known Problems Paternal Aunt    • No Known Problems Paternal  Uncle    • No Known Problems Maternal Grandmother    • No Known Problems Maternal Grandfather    • No Known Problems Paternal Grandmother    • No Known Problems Paternal Grandfather    • No Known Problems Other    • Anemia Neg Hx    • Arrhythmia Neg Hx    • Asthma Neg Hx    • Clotting disorder Neg Hx    • Fainting Neg Hx    • Heart disease Neg Hx    • Heart failure Neg Hx    • Hyperlipidemia Neg Hx        Social History     Tobacco Use   • Smoking status: Former Smoker     Last attempt to quit: 1990     Years since quittin.4   • Smokeless tobacco: Never Used   Substance Use Topics   • Alcohol use: Yes     Comment: Occasional   • Drug use: No       Past Surgical History:   Procedure Laterality Date   • CORONARY STENT PLACEMENT     • EYE SURGERY  10/16/2019    repair detached retina       Patient Active Problem List   Diagnosis   • Acute myocardial infarction, subsequent episode of care (CMS/[facility])   • Acute right-sided low back pain with right-sided sciatica   • Allergic rhinitis   • Coronary angioplasty status   • Coronary atherosclerosis   • Edema   • Encounter for annual general medical examination with abnormal findings in adult   • Colon cancer screening   • Screening PSA (prostate specific antigen)   • GERD (gastroesophageal reflux disease)   • Hepatitis B   • Herpes zoster   • History of tobacco use   • HTN (hypertension), benign   • Hyperlipidemia   • Lumbar disc disease   • Hematoma   • Vertiginous syndrome and labyrinthine disorder   • Myalgia   • Nevus   • Obstructive sleep apnea   • Overweight   • Right hip pain   • Vitamin D deficiency   • Tendinitis of right rotator cuff         Current Outpatient Medications:   •  aspirin 325 MG tablet, Take 1 tablet by mouth Daily., Disp: , Rfl:   •  carvedilol (COREG) 25 MG tablet, Take 1 tablet by mouth 2 (Two) Times a Day., Disp: 180 tablet, Rfl: 2  •  Cholecalciferol (VITAMIN D) 50 MCG ( UT) tablet, Take 1 tablet by mouth Daily., Disp: , Rfl:   •   "cyclobenzaprine (FLEXERIL) 10 MG tablet, Take 1 tablet by mouth At Night As Needed for Muscle Spasms., Disp: 90 tablet, Rfl: 2  •  enalapril (VASOTEC) 5 MG tablet, Take 1 tablet by mouth 2 (Two) Times a Day., Disp: 180 tablet, Rfl: 2  •  nitroglycerin (NITROLINGUAL) 0.4 MG/SPRAY spray, Place 1 spray under the tongue Every 5 (Five) Minutes As Needed for Chest Pain., Disp: 1 each, Rfl: 1  •  ofloxacin (OCUFLOX) 0.3 % ophthalmic solution, Administer 1 drop into the left eye As Needed., Disp: , Rfl: 0  •  rosuvastatin (CRESTOR) 20 MG tablet, Take 1 tablet by mouth Daily., Disp: 90 tablet, Rfl: 2         Review of Systems   Constitutional: Negative for chills and diaphoresis.   Eyes: Negative for visual disturbance.   Respiratory: Negative for shortness of breath.    Cardiovascular: Negative for chest pain and palpitations.   Gastrointestinal: Negative for abdominal pain and nausea.   Endocrine: Negative for polydipsia and polyphagia.   Musculoskeletal: Negative for myalgias, neck pain and neck stiffness.   Skin: Negative for color change and pallor.   Neurological: Positive for tingling and numbness. Negative for focal weakness, seizures and syncope.   Hematological: Negative for adenopathy.   Psychiatric/Behavioral: Negative for hallucinations and suicidal ideas.       I have reviewed and confirmed the accuracy of the ROS as documented by the MA/LPN/RN Kian Weston MD      Objective   /73 (BP Location: Right arm, Patient Position: Sitting, Cuff Size: Adult)   Pulse 88   Temp 97.8 °F (36.6 °C)   Resp 18   Ht 167.6 cm (66\")   Wt 119 kg (262 lb 6.4 oz)   SpO2 97%   BMI 42.35 kg/m²   Wt Readings from Last 3 Encounters:   06/01/20 119 kg (262 lb 6.4 oz)   01/27/20 119 kg (263 lb 3.2 oz)   12/16/19 119 kg (263 lb 6.4 oz)     Physical Exam   Constitutional: He is oriented to person, place, and time. He appears well-developed and well-nourished.   Cardiovascular: Normal rate, regular rhythm, S1 normal, S2 " normal, normal heart sounds, intact distal pulses and normal pulses. Exam reveals no gallop and no friction rub.   No murmur heard.  Pulmonary/Chest: Effort normal and breath sounds normal. No accessory muscle usage or stridor. He has no decreased breath sounds. He has no wheezes. He has no rhonchi. He has no rales.   Abdominal: Soft. Normal appearance, normal aorta and bowel sounds are normal. He exhibits no distension, no pulsatile midline mass and no mass. There is no hepatosplenomegaly. There is no tenderness. There is no rigidity, no rebound, no guarding, no CVA tenderness and negative Khanna's sign. No hernia.   Musculoskeletal:        Right shoulder: Normal. He exhibits normal range of motion, no swelling, no effusion, no crepitus, no deformity, no spasm and normal strength.        Left shoulder: Normal. He exhibits normal range of motion, no tenderness, no swelling, no effusion, no crepitus, no deformity, no spasm and normal strength.        Cervical back: Normal. He exhibits normal range of motion, no tenderness, no swelling, no edema, no deformity and no spasm.   Neurological: He is alert and oriented to person, place, and time. Coordination and gait normal.   Skin: Skin is warm and dry. Turgor is normal. He is not diaphoretic. No pallor.         Assessment/Plan      Medications        Problem List         LOS    Health maintenance.  Doing well, vaccines current.  Baby aspirin daily.  Discussed health maintenance, screening test, lifestyle modification.  Coronary artery disease.  Stable, followed by cardiology.  History of repeat stenting 2015.  Continue risk factor reduction.  Hypertension.  Good control.  Hyperlipidemia.  Improved on Crestor.  Keto diet okay, Follow-up recheck fasting labs.  Retinal detachment.  December/2019.  Improved status post surgery.  Followed by ophthalmology.  Lumbar disc disease.  Followed by pain management/neurosurgery, has had repeat imagery..  s/p course epidural, consider  radiofrequency ablation.  Prostate screening.  Follow-up check PSA.  GERD.  Stable on PPI.  EGD benign/dilation only 2019.  Colon cancer screening.  Colonoscopy benign 2019.  Repeat eval 10 years.  IRENE.  Stable on CPAP.  Rotator cuff tendinitis.  Right.  Injected today.  Rehabilitation exercises discussed.  Consider imaging, Ortho referral if persistent symptoms.     Joint injection  Injection site: right jasmeet  Date/Time: 06/01/2020 4:52 PM  Performed by: Kian Weston MD  Authorized by: Kian Weston MD  Preparation: Patient was prepped and draped in the usual sterile fashion.  Local anesthesia used: no   Anesthesia:  Local anesthesia used: no   Sedation:  Patient sedated: no    Medications Used:  0.5cc  Marcaine: [id number] Lot- [id number] EXP-8/01/2022  0.5cc Lidocaine 10mg/mL: [id number] Lot- [id number] EXP-11/01/2020  1cc DepoMedrol 40mg: [id number] Lot- [id number] EXP-01/01/2021    John Wilburn tolerated procedure well.          Problem List Items Addressed This Visit        Unprioritized    HTN (hypertension), benign - Primary    Hyperlipidemia    Overweight    Tendinitis of right rotator cuff      Other Visit Diagnoses     Acute pain of both shoulders                  Expected course, medications, and adverse effects discussed.  Call or return if worsening or persistent symptoms.

## 2020-07-22 ENCOUNTER — OFFICE VISIT (OUTPATIENT)
Dept: FAMILY MEDICINE CLINIC | Facility: CLINIC | Age: 65
End: 2020-07-22

## 2020-07-22 VITALS
HEIGHT: 66 IN | OXYGEN SATURATION: 97 % | WEIGHT: 250 LBS | TEMPERATURE: 98 F | RESPIRATION RATE: 18 BRPM | HEART RATE: 88 BPM | BODY MASS INDEX: 40.18 KG/M2 | SYSTOLIC BLOOD PRESSURE: 135 MMHG | DIASTOLIC BLOOD PRESSURE: 82 MMHG

## 2020-07-22 DIAGNOSIS — H92.02 LEFT EAR PAIN: Primary | ICD-10-CM

## 2020-07-22 DIAGNOSIS — J01.90 ACUTE BACTERIAL SINUSITIS: ICD-10-CM

## 2020-07-22 DIAGNOSIS — B96.89 ACUTE BACTERIAL SINUSITIS: ICD-10-CM

## 2020-07-22 PROCEDURE — 99213 OFFICE O/P EST LOW 20 MIN: CPT | Performed by: FAMILY MEDICINE

## 2020-07-22 RX ORDER — CEPHALEXIN 500 MG/1
500 CAPSULE ORAL 3 TIMES DAILY
Qty: 30 CAPSULE | Refills: 0 | Status: SHIPPED | OUTPATIENT
Start: 2020-07-22 | End: 2020-08-01

## 2020-07-22 NOTE — PROGRESS NOTES
Subjective   John Wilburn is a 65 y.o. male.     Chief Complaint   Patient presents with   • Earache       Earache    There is pain in the left ear. This is a new problem. The current episode started in the past 7 days. The problem occurs constantly. There has been no fever. The pain is at a severity of 9/10. The pain is severe. Associated symptoms include headaches and hearing loss. Pertinent negatives include no abdominal pain, coughing, ear discharge, sore throat or vomiting. Treatments tried: tylenol. The treatment provided no relief.            I personally reviewed and updated the patient's allergies, medications, problem list, and past medical, surgical, social, and family history.     Family History   Problem Relation Age of Onset   • Hypertension Mother    • Diabetes Mother    • Heart attack Father    • No Known Problems Sister    • No Known Problems Brother    • No Known Problems Maternal Aunt    • No Known Problems Maternal Uncle    • No Known Problems Paternal Aunt    • No Known Problems Paternal Uncle    • No Known Problems Maternal Grandmother    • No Known Problems Maternal Grandfather    • No Known Problems Paternal Grandmother    • No Known Problems Paternal Grandfather    • No Known Problems Other    • Anemia Neg Hx    • Arrhythmia Neg Hx    • Asthma Neg Hx    • Clotting disorder Neg Hx    • Fainting Neg Hx    • Heart disease Neg Hx    • Heart failure Neg Hx    • Hyperlipidemia Neg Hx        Social History     Tobacco Use   • Smoking status: Former Smoker     Last attempt to quit: 1990     Years since quittin.5   • Smokeless tobacco: Never Used   Substance Use Topics   • Alcohol use: Yes     Comment: Occasional   • Drug use: No       Past Surgical History:   Procedure Laterality Date   • CORONARY STENT PLACEMENT     • EYE SURGERY  10/16/2019    repair detached retina       Patient Active Problem List   Diagnosis   • Acute myocardial infarction, subsequent episode of care (CMS/[facility])    • Acute right-sided low back pain with right-sided sciatica   • Allergic rhinitis   • Coronary angioplasty status   • Coronary atherosclerosis   • Edema   • Encounter for annual general medical examination with abnormal findings in adult   • Colon cancer screening   • Screening PSA (prostate specific antigen)   • GERD (gastroesophageal reflux disease)   • Hepatitis B   • Herpes zoster   • History of tobacco use   • HTN (hypertension), benign   • Hyperlipidemia   • Lumbar disc disease   • Hematoma   • Vertiginous syndrome and labyrinthine disorder   • Myalgia   • Nevus   • Obstructive sleep apnea   • Overweight   • Right hip pain   • Vitamin D deficiency   • Tendinitis of right rotator cuff   • Left ear pain         Current Outpatient Medications:   •  aspirin 325 MG tablet, Take 1 tablet by mouth Daily., Disp: , Rfl:   •  carvedilol (COREG) 25 MG tablet, Take 1 tablet by mouth 2 (Two) Times a Day., Disp: 180 tablet, Rfl: 2  •  Cholecalciferol (VITAMIN D) 50 MCG (2000 UT) tablet, Take 1 tablet by mouth Daily., Disp: , Rfl:   •  cyclobenzaprine (FLEXERIL) 10 MG tablet, Take 1 tablet by mouth At Night As Needed for Muscle Spasms., Disp: 90 tablet, Rfl: 2  •  enalapril (VASOTEC) 5 MG tablet, Take 1 tablet by mouth 2 (Two) Times a Day., Disp: 180 tablet, Rfl: 2  •  nitroglycerin (NITROLINGUAL) 0.4 MG/SPRAY spray, Place 1 spray under the tongue Every 5 (Five) Minutes As Needed for Chest Pain., Disp: 1 each, Rfl: 1  •  ofloxacin (OCUFLOX) 0.3 % ophthalmic solution, Administer 1 drop into the left eye As Needed., Disp: , Rfl: 0  •  rosuvastatin (CRESTOR) 20 MG tablet, Take 1 tablet by mouth Daily., Disp: 90 tablet, Rfl: 2  •  cephalexin (KEFLEX) 500 MG capsule, Take 1 capsule by mouth 3 (Three) Times a Day for 10 days., Disp: 30 capsule, Rfl: 0         Review of Systems   Constitutional: Negative for chills and diaphoresis.   HENT: Positive for ear pain and hearing loss. Negative for ear discharge and sore throat.   "  Eyes: Negative for visual disturbance.   Respiratory: Negative for cough and shortness of breath.    Cardiovascular: Negative for chest pain and palpitations.   Gastrointestinal: Negative for abdominal pain, nausea and vomiting.   Endocrine: Negative for polydipsia and polyphagia.   Musculoskeletal: Negative for neck stiffness.   Skin: Negative for color change and pallor.   Neurological: Negative for seizures and syncope.   Hematological: Negative for adenopathy.       I have reviewed and confirmed the accuracy of the ROS as documented by the MA/LPN/RN Kian Weston MD      Objective   /82 (BP Location: Left arm, Patient Position: Sitting, Cuff Size: Adult)   Pulse 88   Temp 98 °F (36.7 °C)   Resp 18   Ht 167.6 cm (66\")   Wt 113 kg (250 lb)   SpO2 97%   BMI 40.35 kg/m²   BP Readings from Last 3 Encounters:   07/22/20 135/82   06/01/20 121/73   01/27/20 128/80     Wt Readings from Last 3 Encounters:   07/22/20 113 kg (250 lb)   06/01/20 119 kg (262 lb 6.4 oz)   01/27/20 119 kg (263 lb 3.2 oz)     Physical Exam   Constitutional: He is oriented to person, place, and time. He appears well-developed and well-nourished.   HENT:   Head: Normocephalic.   Right Ear: Hearing, external ear and ear canal normal. Tympanic membrane is erythematous. A middle ear effusion is present.   Left Ear: Hearing, external ear and ear canal normal. Tympanic membrane is erythematous. A middle ear effusion is present.   Nose: Congestion present. Right sinus exhibits maxillary sinus tenderness and frontal sinus tenderness. Left sinus exhibits maxillary sinus tenderness and frontal sinus tenderness.   Mouth/Throat: Posterior oropharyngeal erythema present. No tonsillar abscesses. Tonsils are 1+ on the right. Tonsils are 1+ on the left.   Eyes: Pupils are equal, round, and reactive to light. Conjunctivae, EOM and lids are normal.   Neck: No Brudzinski's sign and no Kernig's sign noted.   Cardiovascular: Normal rate, regular " rhythm, S1 normal, S2 normal, normal heart sounds and normal pulses. Exam reveals no gallop and no friction rub.   No murmur heard.  Pulmonary/Chest: Effort normal. No accessory muscle usage or stridor. No respiratory distress. He has no decreased breath sounds. He has wheezes in the right upper field, the right middle field, the right lower field, the left upper field, the left middle field and the left lower field. He has rhonchi in the right upper field, the right middle field, the right lower field, the left upper field, the left middle field and the left lower field. He has no rales.   Abdominal: Soft. Normal appearance and bowel sounds are normal. He exhibits no distension and no mass. There is no tenderness. No hernia.   Neurological: He is alert and oriented to person, place, and time. No cranial nerve deficit. Coordination and gait normal.   Skin: Skin is warm and dry. Turgor is normal. He is not diaphoretic. No pallor.         Assessment/Plan      Medications        Problem List         LOS    Acute bacterial sinusitis.  Start antibiotics.  Allergic rhinitis.  Over-the-counter Claritin/Zyrtec.  Health maintenance.  Doing well, vaccines current.  Baby aspirin daily.  Discussed health maintenance, screening test, lifestyle modification.  Coronary artery disease.  Stable, followed by cardiology.  History of repeat stenting 2015.  Continue risk factor reduction.  Hypertension.  Good control.  Hyperlipidemia.  Improved on Crestor.  Keto diet okay, Follow-up recheck fasting labs.  Retinal detachment.  December/2019.  Improved status post surgery.  Followed by ophthalmology.  Lumbar disc disease.  Followed by pain management/neurosurgery, has had repeat imagery..  s/p course epidural, consider radiofrequency ablation.  Prostate screening.  Follow-up check PSA.  GERD.  Stable on PPI.  EGD benign/dilation only 2019.  Colon cancer screening.  Colonoscopy benign 2019.  Repeat eval 10 years.  IRENE.  Stable on  CPAP.  Rotator cuff tendinitis.    Right.  Much improved today status post injection.  Rehabilitation exercises discussed.  Consider imaging, Ortho referral if persistent symptoms.         Problem List Items Addressed This Visit        Unprioritized    Left ear pain - Primary              Expected course, medications, and adverse effects discussed.  Call or return if worsening or persistent symptoms.

## 2020-07-25 PROBLEM — B96.89 ACUTE BACTERIAL SINUSITIS: Status: ACTIVE | Noted: 2020-07-25

## 2020-07-25 PROBLEM — J01.90 ACUTE BACTERIAL SINUSITIS: Status: ACTIVE | Noted: 2020-07-25

## 2020-07-27 ENCOUNTER — OFFICE VISIT (OUTPATIENT)
Dept: FAMILY MEDICINE CLINIC | Facility: CLINIC | Age: 65
End: 2020-07-27

## 2020-07-27 VITALS
DIASTOLIC BLOOD PRESSURE: 66 MMHG | HEART RATE: 68 BPM | SYSTOLIC BLOOD PRESSURE: 138 MMHG | WEIGHT: 246 LBS | OXYGEN SATURATION: 94 % | TEMPERATURE: 98 F | HEIGHT: 66 IN | RESPIRATION RATE: 18 BRPM | BODY MASS INDEX: 39.53 KG/M2

## 2020-07-27 DIAGNOSIS — Z87.891 HISTORY OF TOBACCO USE: ICD-10-CM

## 2020-07-27 DIAGNOSIS — I10 HTN (HYPERTENSION), BENIGN: Primary | ICD-10-CM

## 2020-07-27 DIAGNOSIS — E66.3 OVERWEIGHT: ICD-10-CM

## 2020-07-27 DIAGNOSIS — I25.10 ATHEROSCLEROSIS OF NATIVE CORONARY ARTERY OF NATIVE HEART WITHOUT ANGINA PECTORIS: ICD-10-CM

## 2020-07-27 DIAGNOSIS — E78.2 MIXED HYPERLIPIDEMIA: ICD-10-CM

## 2020-07-27 PROCEDURE — 99213 OFFICE O/P EST LOW 20 MIN: CPT | Performed by: FAMILY MEDICINE

## 2020-07-27 NOTE — PROGRESS NOTES
Subjective   John Wilburn is a 65 y.o. male.     Chief Complaint   Patient presents with   • Hyperlipidemia   • Hypertension       Hyperlipidemia   This is a chronic problem. The current episode started more than 1 year ago. Pertinent negatives include no chest pain, focal weakness, myalgias or shortness of breath. Current antihyperlipidemic treatment includes statins. The current treatment provides mild improvement of lipids. There are no compliance problems.  Risk factors for coronary artery disease include dyslipidemia, hypertension, male sex and obesity.   Hypertension   This is a chronic problem. The current episode started more than 1 year ago. The problem is unchanged. Pertinent negatives include no anxiety, chest pain, neck pain, palpitations, shortness of breath or sweats. Risk factors for coronary artery disease include male gender and dyslipidemia. Current antihypertension treatment includes beta blockers and ACE inhibitors. The current treatment provides moderate improvement. There are no compliance problems.             I personally reviewed and updated the patient's allergies, medications, problem list, and past medical, surgical, social, and family history.     Family History   Problem Relation Age of Onset   • Hypertension Mother    • Diabetes Mother    • Heart attack Father    • No Known Problems Sister    • No Known Problems Brother    • No Known Problems Maternal Aunt    • No Known Problems Maternal Uncle    • No Known Problems Paternal Aunt    • No Known Problems Paternal Uncle    • No Known Problems Maternal Grandmother    • No Known Problems Maternal Grandfather    • No Known Problems Paternal Grandmother    • No Known Problems Paternal Grandfather    • No Known Problems Other    • Anemia Neg Hx    • Arrhythmia Neg Hx    • Asthma Neg Hx    • Clotting disorder Neg Hx    • Fainting Neg Hx    • Heart disease Neg Hx    • Heart failure Neg Hx    • Hyperlipidemia Neg Hx        Social History      Tobacco Use   • Smoking status: Former Smoker     Last attempt to quit: 1990     Years since quittin.5   • Smokeless tobacco: Never Used   Substance Use Topics   • Alcohol use: Yes     Comment: Occasional   • Drug use: No       Past Surgical History:   Procedure Laterality Date   • CORONARY STENT PLACEMENT     • EYE SURGERY  10/16/2019    repair detached retina       Patient Active Problem List   Diagnosis   • Acute myocardial infarction, subsequent episode of care (CMS/[facility])   • Acute right-sided low back pain with right-sided sciatica   • Allergic rhinitis   • Coronary angioplasty status   • Coronary atherosclerosis   • Edema   • Encounter for annual general medical examination with abnormal findings in adult   • Colon cancer screening   • Screening PSA (prostate specific antigen)   • GERD (gastroesophageal reflux disease)   • Hepatitis B   • Herpes zoster   • History of tobacco use   • HTN (hypertension), benign   • Hyperlipidemia   • Lumbar disc disease   • Hematoma   • Vertiginous syndrome and labyrinthine disorder   • Myalgia   • Nevus   • Obstructive sleep apnea   • Overweight   • Right hip pain   • Vitamin D deficiency   • Tendinitis of right rotator cuff   • Left ear pain   • Acute bacterial sinusitis         Current Outpatient Medications:   •  aspirin 325 MG tablet, Take 1 tablet by mouth Daily., Disp: , Rfl:   •  carvedilol (COREG) 25 MG tablet, Take 1 tablet by mouth 2 (Two) Times a Day., Disp: 180 tablet, Rfl: 2  •  cephalexin (KEFLEX) 500 MG capsule, Take 1 capsule by mouth 3 (Three) Times a Day for 10 days., Disp: 30 capsule, Rfl: 0  •  Cholecalciferol (VITAMIN D) 50 MCG (2000 UT) tablet, Take 1 tablet by mouth Daily., Disp: , Rfl:   •  cyclobenzaprine (FLEXERIL) 10 MG tablet, Take 1 tablet by mouth At Night As Needed for Muscle Spasms., Disp: 90 tablet, Rfl: 2  •  enalapril (VASOTEC) 5 MG tablet, Take 1 tablet by mouth 2 (Two) Times a Day., Disp: 180 tablet, Rfl: 2  •  nitroglycerin  "(NITROLINGUAL) 0.4 MG/SPRAY spray, Place 1 spray under the tongue Every 5 (Five) Minutes As Needed for Chest Pain., Disp: 1 each, Rfl: 1  •  ofloxacin (OCUFLOX) 0.3 % ophthalmic solution, Administer 1 drop into the left eye As Needed., Disp: , Rfl: 0  •  rosuvastatin (CRESTOR) 20 MG tablet, Take 1 tablet by mouth Daily., Disp: 90 tablet, Rfl: 2         Review of Systems   Constitutional: Negative for chills and diaphoresis.   Eyes: Negative for visual disturbance.   Respiratory: Negative for shortness of breath.    Cardiovascular: Negative for chest pain and palpitations.   Gastrointestinal: Negative for abdominal pain and nausea.   Endocrine: Negative for polydipsia and polyphagia.   Musculoskeletal: Negative for myalgias, neck pain and neck stiffness.   Skin: Negative for color change and pallor.   Neurological: Negative for focal weakness, seizures and syncope.   Hematological: Negative for adenopathy.       I have reviewed and confirmed the accuracy of the ROS as documented by the MA/LPN/RN Kian Weston MD      Objective   /66 (BP Location: Right arm, Patient Position: Sitting, Cuff Size: Adult)   Pulse 68   Temp 98 °F (36.7 °C)   Resp 18   Ht 167.6 cm (66\")   Wt 112 kg (246 lb)   SpO2 94%   BMI 39.71 kg/m²   BP Readings from Last 3 Encounters:   07/27/20 138/66   07/22/20 135/82   06/01/20 121/73     Wt Readings from Last 3 Encounters:   07/27/20 112 kg (246 lb)   07/22/20 113 kg (250 lb)   06/01/20 119 kg (262 lb 6.4 oz)     Physical Exam   Constitutional: He is oriented to person, place, and time. He appears well-developed and well-nourished.   Cardiovascular: Normal rate, regular rhythm, S1 normal, S2 normal, normal heart sounds, intact distal pulses and normal pulses. Exam reveals no gallop and no friction rub.   No murmur heard.  Pulmonary/Chest: Effort normal and breath sounds normal. No accessory muscle usage or stridor. He has no decreased breath sounds. He has no wheezes. He has no " rhonchi. He has no rales.   Abdominal: Soft. Normal appearance, normal aorta and bowel sounds are normal. He exhibits no distension, no pulsatile midline mass and no mass. There is no hepatosplenomegaly. There is no tenderness. There is no rigidity, no rebound, no guarding, no CVA tenderness and negative Khanna's sign. No hernia.   Neurological: He is alert and oriented to person, place, and time. Coordination and gait normal.   Skin: Skin is warm and dry. Turgor is normal. He is not diaphoretic. No pallor.         Assessment/Plan      Medications        Problem List         LOS    Acute bacterial sinusitis.    Improving on antibiotics.  Allergic rhinitis.  Over-the-counter Claritin/Zyrtec.  Health maintenance.  Doing well, vaccines current.  Baby aspirin daily.  Discussed health maintenance, screening test, lifestyle modification.  Coronary artery disease.  Stable, followed by cardiology Dr. escobedo.  History of repeat stenting 2015.  Continue risk factor reduction.  Remote history of carotid Dopplers, recommend repeat, he wants to discuss with his cardiologist.  Hypertension.  Good control.  Hyperlipidemia.  Improved on Crestor.  Keto diet okay, Follow-up recheck fasting labs.  Retinal detachment.  December/2019.  Improved status post surgery.  Followed by ophthalmology.  Lumbar disc disease.  Followed by pain management/neurosurgery, has had repeat imagery..  s/p course epidural, consider radiofrequency ablation.  Prostate screening.  Follow-up check PSA.  GERD.  Stable on PPI.  EGD benign/dilation only 2019.  Colon cancer screening.  Colonoscopy benign 2019.  Repeat eval 10 years.  IRENE.  Stable on CPAP.  Rotator cuff tendinitis.    Right.  Much improved today status post injection.  Rehabilitation exercises discussed.  Consider imaging, Ortho referral if persistent symptoms.  Recommend follow-up visit for comprehensive physical exam screening test.         Problem List Items Addressed This Visit         Unprioritized    Coronary atherosclerosis    History of tobacco use    HTN (hypertension), benign - Primary    Hyperlipidemia    Overweight              Expected course, medications, and adverse effects discussed.  Call or return if worsening or persistent symptoms.

## 2020-07-31 ENCOUNTER — OFFICE VISIT (OUTPATIENT)
Dept: FAMILY MEDICINE CLINIC | Facility: CLINIC | Age: 65
End: 2020-07-31

## 2020-07-31 VITALS
HEIGHT: 66 IN | HEART RATE: 82 BPM | DIASTOLIC BLOOD PRESSURE: 72 MMHG | SYSTOLIC BLOOD PRESSURE: 130 MMHG | RESPIRATION RATE: 18 BRPM | OXYGEN SATURATION: 98 % | BODY MASS INDEX: 40.4 KG/M2 | WEIGHT: 251.4 LBS | TEMPERATURE: 98.7 F

## 2020-07-31 DIAGNOSIS — H92.02 LEFT EAR PAIN: Primary | ICD-10-CM

## 2020-07-31 PROCEDURE — 99213 OFFICE O/P EST LOW 20 MIN: CPT | Performed by: FAMILY MEDICINE

## 2020-07-31 RX ORDER — FLUTICASONE PROPIONATE 50 MCG
2 SPRAY, SUSPENSION (ML) NASAL DAILY
Qty: 16 G | Refills: 0 | Status: SHIPPED | OUTPATIENT
Start: 2020-07-31 | End: 2020-08-27

## 2020-07-31 RX ORDER — METHYLPREDNISOLONE 4 MG/1
TABLET ORAL
Qty: 21 TABLET | Refills: 0 | Status: SHIPPED | OUTPATIENT
Start: 2020-07-31 | End: 2020-08-07

## 2020-07-31 NOTE — PROGRESS NOTES
Chief Complaint   Patient presents with   • Earache     left       Subjective   John Wilburn is a 65 y.o. male.     Earache    There is pain in the left ear. This is a new problem. Episode onset: 2 weeks. The problem occurs constantly. The problem has been gradually worsening. There has been no fever. The pain is at a severity of 8/10. The pain is severe. Associated symptoms include hearing loss (left). Pertinent negatives include no abdominal pain, coughing, ear discharge, rhinorrhea, sore throat or vomiting. He has tried antibiotics (cephalexin) for the symptoms. The treatment provided mild relief.     Was seen for this complaint on 7/22/2020 by Dr. Weston and prescribed cephalexin. It helped a little then symptoms started to come back.      I have reviewed and updated his medications, medical history and problem list during today's office visit.       Past Medical History :  Active Ambulatory Problems     Diagnosis Date Noted   • Acute myocardial infarction, subsequent episode of care (CMS/[facility]) 12/06/2019   • Acute right-sided low back pain with right-sided sciatica 12/06/2019   • Allergic rhinitis 12/06/2019   • Coronary angioplasty status 12/06/2019   • Coronary atherosclerosis 01/01/1960   • Edema 03/02/2017   • Encounter for annual general medical examination with abnormal findings in adult 12/06/2019   • Colon cancer screening 12/06/2019   • Screening PSA (prostate specific antigen) 12/06/2019   • GERD (gastroesophageal reflux disease) 12/06/2019   • Hepatitis B 12/06/2019   • Herpes zoster 12/06/2019   • History of tobacco use 12/06/2019   • HTN (hypertension), benign 12/06/2019   • Hyperlipidemia 12/06/2019   • Lumbar disc disease 12/06/2019   • Hematoma 12/06/2019   • Vertiginous syndrome and labyrinthine disorder 12/06/2019   • Myalgia 12/06/2019   • Nevus 12/06/2019   • Obstructive sleep apnea 12/06/2019   • Overweight 12/06/2019   • Right hip pain 12/06/2019   • Vitamin D deficiency 12/06/2019   •  Tendinitis of right rotator cuff 06/01/2020   • Left ear pain 07/22/2020   • Acute bacterial sinusitis 07/25/2020     Resolved Ambulatory Problems     Diagnosis Date Noted   • No Resolved Ambulatory Problems     Past Medical History:   Diagnosis Date   • Acute bronchitis    • Acute non-recurrent frontal sinusitis    • Allergy to poison ivy    • Annual visit for general adult medical examination with abnormal findings    • Bronchitis    • Cellulitis of buttock    • Chest pain    • Conjunctivitis    • Malaise and fatigue    • Obesity    • Obesity, morbid, BMI 40.0-49.9 (CMS/HCC)    • Overweight (BMI 25.0-29.9)    • Pruritus    • Screening for depression    • Sinusitis, bacterial    • Skin lesion    • Sleep apnea, obstructive    • URI (upper respiratory infection)    • Vertigo        Medication List:    Current Outpatient Medications:   •  aspirin 325 MG tablet, Take 1 tablet by mouth Daily., Disp: , Rfl:   •  carvedilol (COREG) 25 MG tablet, Take 1 tablet by mouth 2 (Two) Times a Day., Disp: 180 tablet, Rfl: 2  •  cephalexin (KEFLEX) 500 MG capsule, Take 1 capsule by mouth 3 (Three) Times a Day for 10 days., Disp: 30 capsule, Rfl: 0  •  Cholecalciferol (VITAMIN D) 50 MCG (2000 UT) tablet, Take 1 tablet by mouth Daily., Disp: , Rfl:   •  cyclobenzaprine (FLEXERIL) 10 MG tablet, Take 1 tablet by mouth At Night As Needed for Muscle Spasms., Disp: 90 tablet, Rfl: 2  •  enalapril (VASOTEC) 5 MG tablet, Take 1 tablet by mouth 2 (Two) Times a Day., Disp: 180 tablet, Rfl: 2  •  nitroglycerin (NITROLINGUAL) 0.4 MG/SPRAY spray, Place 1 spray under the tongue Every 5 (Five) Minutes As Needed for Chest Pain., Disp: 1 each, Rfl: 1  •  ofloxacin (OCUFLOX) 0.3 % ophthalmic solution, Administer 1 drop into the left eye As Needed., Disp: , Rfl: 0  •  rosuvastatin (CRESTOR) 20 MG tablet, Take 1 tablet by mouth Daily., Disp: 90 tablet, Rfl: 2  •  fluticasone (Flonase) 50 MCG/ACT nasal spray, 2 sprays into the nostril(s) as directed by  "provider Daily., Disp: 16 g, Rfl: 0  •  methylPREDNISolone (MEDROL, NI,) 4 MG tablet, Take as directed on package instructions., Disp: 21 tablet, Rfl: 0    Allergies   Allergen Reactions   • Atorvastatin Hives   • Propoxyphene Hives       Social History     Tobacco Use   • Smoking status: Former Smoker     Last attempt to quit: 1990     Years since quittin.6   • Smokeless tobacco: Never Used   Substance Use Topics   • Alcohol use: Yes     Comment: Occasional       Review of Systems   Constitutional: Negative for chills and fever.   HENT: Positive for ear pain, hearing loss (left) and swollen glands (one, left anterior cervical reported by patient). Negative for congestion, dental problem, ear discharge, facial swelling, postnasal drip, rhinorrhea, sinus pressure, sneezing and sore throat.    Eyes: Negative for pain, discharge and redness.   Respiratory: Negative for cough, shortness of breath and wheezing.    Cardiovascular: Negative for chest pain.   Gastrointestinal: Negative for abdominal pain, nausea and vomiting.   Allergic/Immunologic: Negative for environmental allergies.   Neurological: Negative for dizziness, syncope and headache.       I have reviewed and confirmed the accuracy of the ROS as documented by the MA/LPN/RN Saritha Vitale MD      Objective   Vitals:    20 0955   BP: 130/72   BP Location: Right arm   Patient Position: Sitting   Cuff Size: Large Adult   Pulse: 82   Resp: 18   Temp: 98.7 °F (37.1 °C)   TempSrc: Oral   SpO2: 98%   Weight: 114 kg (251 lb 6.4 oz)   Height: 167.6 cm (66\")     Body mass index is 40.58 kg/m².    Physical Exam   Constitutional: He appears well-developed and well-nourished. No distress.   HENT:   Head: Normocephalic and atraumatic.   Right Ear: External ear and ear canal normal. No drainage, swelling or tenderness. No mastoid tenderness. Tympanic membrane is not injected (there is a very small amount of redness centrally) and not perforated. "   Left Ear: External ear and ear canal normal. No drainage, swelling or tenderness. No mastoid tenderness. Tympanic membrane is not injected, not perforated and not erythematous.         Lab Results   Component Value Date     (H) 07/20/2020    BUN 10 07/20/2020    CREATININE 1.02 07/20/2020    EGFRIFNONA 78 07/20/2020    EGFRIFAFRI 90 07/20/2020     07/20/2020    K 5.5 (H) 07/20/2020     07/20/2020    CALCIUM 9.4 07/20/2020    ALBUMIN 4.2 07/20/2020    BILITOT 0.3 07/20/2020    ALKPHOS 62 07/20/2020    AST 33 07/20/2020    ALT 64 (H) 07/20/2020    CHLPL 124 07/20/2020    TRIG 175 (H) 07/20/2020    HDL 39 (L) 07/20/2020    VLDL 35 07/20/2020    LDL 50 07/20/2020    WBC 6.6 07/20/2020    RBC 4.82 07/20/2020    HCT 42.8 07/20/2020    MCV 89 07/20/2020    MCH 29.5 07/20/2020    TSH 3.310 07/20/2020    PSA 0.4 07/20/2020          Assessment/Plan     Diagnoses and all orders for this visit:    1. Left ear pain (Primary)  -     methylPREDNISolone (MEDROL, NI,) 4 MG tablet; Take as directed on package instructions.  Dispense: 21 tablet; Refill: 0  -     fluticasone (Flonase) 50 MCG/ACT nasal spray; 2 sprays into the nostril(s) as directed by provider Daily.  Dispense: 16 g; Refill: 0        Return if symptoms worsen or fail to improve.     I wore protective equipment throughout this patient encounter to include mask. Hand hygiene was performed before donning protective equipment and after removal when leaving the room.  Patient also wore a mask.

## 2020-08-07 ENCOUNTER — HOSPITAL ENCOUNTER (OUTPATIENT)
Facility: HOSPITAL | Age: 65
Setting detail: OBSERVATION
Discharge: HOME | End: 2020-08-08
Attending: EMERGENCY MEDICINE | Admitting: INTERNAL MEDICINE

## 2020-08-07 ENCOUNTER — APPOINTMENT (OUTPATIENT)
Dept: CARDIOLOGY | Facility: HOSPITAL | Age: 65
End: 2020-08-07

## 2020-08-07 ENCOUNTER — APPOINTMENT (OUTPATIENT)
Dept: GENERAL RADIOLOGY | Facility: HOSPITAL | Age: 65
End: 2020-08-07

## 2020-08-07 DIAGNOSIS — I20.0 UNSTABLE ANGINA (HCC): Primary | ICD-10-CM

## 2020-08-07 LAB
ANION GAP SERPL CALCULATED.3IONS-SCNC: 9 MMOL/L (ref 5–15)
BASOPHILS # BLD AUTO: 0.1 10*3/MM3 (ref 0–0.2)
BASOPHILS NFR BLD AUTO: 1 % (ref 0–1.5)
BUN SERPL-MCNC: 19 MG/DL (ref 8–23)
BUN SERPL-MCNC: ABNORMAL MG/DL
BUN/CREAT SERPL: ABNORMAL
CALCIUM SPEC-SCNC: 9.1 MG/DL (ref 8.6–10.5)
CHLORIDE SERPL-SCNC: 100 MMOL/L (ref 98–107)
CO2 SERPL-SCNC: 29 MMOL/L (ref 22–29)
CREAT SERPL-MCNC: 1.21 MG/DL (ref 0.76–1.27)
DEPRECATED RDW RBC AUTO: 44.2 FL (ref 37–54)
EOSINOPHIL # BLD AUTO: 0.2 10*3/MM3 (ref 0–0.4)
EOSINOPHIL NFR BLD AUTO: 2.3 % (ref 0.3–6.2)
ERYTHROCYTE [DISTWIDTH] IN BLOOD BY AUTOMATED COUNT: 14.6 % (ref 12.3–15.4)
GFR SERPL CREATININE-BSD FRML MDRD: 61 ML/MIN/1.73
GLUCOSE SERPL-MCNC: 119 MG/DL (ref 65–99)
HCT VFR BLD AUTO: 43.1 % (ref 37.5–51)
HGB BLD-MCNC: 14.4 G/DL (ref 13–17.7)
HOLD SPECIMEN: NORMAL
LYMPHOCYTES # BLD AUTO: 1.9 10*3/MM3 (ref 0.7–3.1)
LYMPHOCYTES NFR BLD AUTO: 24.5 % (ref 19.6–45.3)
MCH RBC QN AUTO: 29.6 PG (ref 26.6–33)
MCHC RBC AUTO-ENTMCNC: 33.5 G/DL (ref 31.5–35.7)
MCV RBC AUTO: 88.2 FL (ref 79–97)
MONOCYTES # BLD AUTO: 0.8 10*3/MM3 (ref 0.1–0.9)
MONOCYTES NFR BLD AUTO: 10.9 % (ref 5–12)
NEUTROPHILS NFR BLD AUTO: 4.8 10*3/MM3 (ref 1.7–7)
NEUTROPHILS NFR BLD AUTO: 61.3 % (ref 42.7–76)
NRBC BLD AUTO-RTO: 0.2 /100 WBC (ref 0–0.2)
PLATELET # BLD AUTO: 220 10*3/MM3 (ref 140–450)
PMV BLD AUTO: 7.2 FL (ref 6–12)
POTASSIUM SERPL-SCNC: 4.4 MMOL/L (ref 3.5–5.2)
RBC # BLD AUTO: 4.88 10*6/MM3 (ref 4.14–5.8)
SODIUM SERPL-SCNC: 138 MMOL/L (ref 136–145)
TROPONIN T SERPL-MCNC: <0.01 NG/ML (ref 0–0.03)
WBC # BLD AUTO: 7.8 10*3/MM3 (ref 3.4–10.8)
WHOLE BLOOD HOLD SPECIMEN: NORMAL

## 2020-08-07 PROCEDURE — G0378 HOSPITAL OBSERVATION PER HR: HCPCS

## 2020-08-07 PROCEDURE — 71045 X-RAY EXAM CHEST 1 VIEW: CPT

## 2020-08-07 PROCEDURE — 25010000002 ENOXAPARIN PER 10 MG: Performed by: INTERNAL MEDICINE

## 2020-08-07 PROCEDURE — 96372 THER/PROPH/DIAG INJ SC/IM: CPT

## 2020-08-07 PROCEDURE — 93005 ELECTROCARDIOGRAM TRACING: CPT | Performed by: EMERGENCY MEDICINE

## 2020-08-07 PROCEDURE — 80048 BASIC METABOLIC PNL TOTAL CA: CPT | Performed by: EMERGENCY MEDICINE

## 2020-08-07 PROCEDURE — 96366 THER/PROPH/DIAG IV INF ADDON: CPT

## 2020-08-07 PROCEDURE — 99284 EMERGENCY DEPT VISIT MOD MDM: CPT

## 2020-08-07 PROCEDURE — 96365 THER/PROPH/DIAG IV INF INIT: CPT

## 2020-08-07 PROCEDURE — 84484 ASSAY OF TROPONIN QUANT: CPT | Performed by: INTERNAL MEDICINE

## 2020-08-07 PROCEDURE — 93306 TTE W/DOPPLER COMPLETE: CPT

## 2020-08-07 PROCEDURE — 84484 ASSAY OF TROPONIN QUANT: CPT | Performed by: EMERGENCY MEDICINE

## 2020-08-07 PROCEDURE — 85025 COMPLETE CBC W/AUTO DIFF WBC: CPT | Performed by: EMERGENCY MEDICINE

## 2020-08-07 PROCEDURE — 25010000002 ENOXAPARIN PER 10 MG: Performed by: EMERGENCY MEDICINE

## 2020-08-07 RX ORDER — NICOTINE 21 MG/24HR
1 PATCH, TRANSDERMAL 24 HOURS TRANSDERMAL EVERY 24 HOURS
Status: DISCONTINUED | OUTPATIENT
Start: 2020-08-07 | End: 2020-08-08 | Stop reason: HOSPADM

## 2020-08-07 RX ORDER — PANTOPRAZOLE SODIUM 40 MG/1
40 TABLET, DELAYED RELEASE ORAL
Status: DISCONTINUED | OUTPATIENT
Start: 2020-08-08 | End: 2020-08-08 | Stop reason: HOSPADM

## 2020-08-07 RX ORDER — BISACODYL 10 MG
10 SUPPOSITORY, RECTAL RECTAL DAILY PRN
Status: DISCONTINUED | OUTPATIENT
Start: 2020-08-07 | End: 2020-08-08 | Stop reason: HOSPADM

## 2020-08-07 RX ORDER — SODIUM CHLORIDE 0.9 % (FLUSH) 0.9 %
10 SYRINGE (ML) INJECTION AS NEEDED
Status: DISCONTINUED | OUTPATIENT
Start: 2020-08-07 | End: 2020-08-08 | Stop reason: HOSPADM

## 2020-08-07 RX ORDER — NITROGLYCERIN 0.4 MG/1
0.4 TABLET SUBLINGUAL
Status: DISCONTINUED | OUTPATIENT
Start: 2020-08-07 | End: 2020-08-08 | Stop reason: HOSPADM

## 2020-08-07 RX ORDER — CARVEDILOL 25 MG/1
25 TABLET ORAL
Status: DISCONTINUED | OUTPATIENT
Start: 2020-08-07 | End: 2020-08-08 | Stop reason: HOSPADM

## 2020-08-07 RX ORDER — ROSUVASTATIN CALCIUM 10 MG/1
20 TABLET, COATED ORAL DAILY
Status: DISCONTINUED | OUTPATIENT
Start: 2020-08-08 | End: 2020-08-08 | Stop reason: HOSPADM

## 2020-08-07 RX ORDER — LANSOPRAZOLE
30 KIT EVERY MORNING
Status: DISCONTINUED | OUTPATIENT
Start: 2020-08-08 | End: 2020-08-07

## 2020-08-07 RX ORDER — PANTOPRAZOLE SODIUM 40 MG/1
40 TABLET, DELAYED RELEASE ORAL
Status: DISCONTINUED | OUTPATIENT
Start: 2020-08-08 | End: 2020-08-07

## 2020-08-07 RX ORDER — SODIUM CHLORIDE 0.9 % (FLUSH) 0.9 %
10 SYRINGE (ML) INJECTION EVERY 12 HOURS SCHEDULED
Status: DISCONTINUED | OUTPATIENT
Start: 2020-08-07 | End: 2020-08-08 | Stop reason: HOSPADM

## 2020-08-07 RX ORDER — CYCLOBENZAPRINE HCL 10 MG
10 TABLET ORAL NIGHTLY PRN
Status: DISCONTINUED | OUTPATIENT
Start: 2020-08-07 | End: 2020-08-08 | Stop reason: HOSPADM

## 2020-08-07 RX ORDER — NITROGLYCERIN 20 MG/100ML
10-50 INJECTION INTRAVENOUS
Status: DISCONTINUED | OUTPATIENT
Start: 2020-08-07 | End: 2020-08-08

## 2020-08-07 RX ORDER — ASPIRIN 325 MG
325 TABLET ORAL ONCE
Status: COMPLETED | OUTPATIENT
Start: 2020-08-07 | End: 2020-08-07

## 2020-08-07 RX ORDER — ENALAPRIL MALEATE 5 MG/1
5 TABLET ORAL 2 TIMES DAILY
Status: DISCONTINUED | OUTPATIENT
Start: 2020-08-07 | End: 2020-08-08 | Stop reason: HOSPADM

## 2020-08-07 RX ORDER — SODIUM CHLORIDE 9 MG/ML
100 INJECTION, SOLUTION INTRAVENOUS CONTINUOUS
Status: DISCONTINUED | OUTPATIENT
Start: 2020-08-07 | End: 2020-08-08 | Stop reason: HOSPADM

## 2020-08-07 RX ADMIN — ENALAPRIL MALEATE 5 MG: 5 TABLET ORAL at 21:23

## 2020-08-07 RX ADMIN — NITROGLYCERIN 10 MCG/MIN: 20 INJECTION INTRAVENOUS at 09:53

## 2020-08-07 RX ADMIN — CARVEDILOL 25 MG: 25 TABLET, FILM COATED ORAL at 17:26

## 2020-08-07 RX ADMIN — Medication 10 ML: at 21:24

## 2020-08-07 RX ADMIN — ENOXAPARIN SODIUM 110 MG: 120 INJECTION SUBCUTANEOUS at 11:44

## 2020-08-07 RX ADMIN — SODIUM CHLORIDE 100 ML/HR: 900 INJECTION, SOLUTION INTRAVENOUS at 17:25

## 2020-08-07 RX ADMIN — ENOXAPARIN SODIUM 110 MG: 120 INJECTION SUBCUTANEOUS at 21:23

## 2020-08-07 RX ADMIN — ASPIRIN 325 MG ORAL TABLET 325 MG: 325 PILL ORAL at 09:40

## 2020-08-07 RX ADMIN — CYCLOBENZAPRINE HYDROCHLORIDE 10 MG: 10 TABLET, FILM COATED ORAL at 21:23

## 2020-08-07 NOTE — ED NOTES
Pt c/o CP starting about 20 minutes ago. Pt hx of stents in 2015. Took 3 sprays of nitro with some relief.      Mago Arguello, RN  08/07/20 0928

## 2020-08-07 NOTE — PLAN OF CARE
Problem: Patient Care Overview  Goal: Plan of Care Review  Outcome: Ongoing (interventions implemented as appropriate)  Flowsheets (Taken 8/7/2020 7774)  Progress: improving  Plan of Care Reviewed With: patient  Outcome Summary: Pt. admitted from the ED. Tridil gtt was started in ED at 10mcg/min and is currently still running at same rate. Patient denies chest pain at this time. VSS, bedside monitor in place. Will continue to monitor.  Goal: Individualization and Mutuality  Outcome: Ongoing (interventions implemented as appropriate)  Goal: Discharge Needs Assessment  Outcome: Ongoing (interventions implemented as appropriate)  Goal: Interprofessional Rounds/Family Conf  Outcome: Ongoing (interventions implemented as appropriate)     Problem: Cardiac: ACS (Acute Coronary Syndrome) (Adult)  Goal: Signs and Symptoms of Listed Potential Problems Will be Absent, Minimized or Managed (Cardiac: ACS)  Outcome: Ongoing (interventions implemented as appropriate)

## 2020-08-07 NOTE — H&P
[facility]      Patient Name: John Wilburn  : 1955  MRN: [id number]  Primary Care Physician: Kian Weston MD  Date of admission: 2020    Patient Care Team:  Kian Weston MD as PCP - General  Kian Weston MD as PCP - Family Medicine  Sigifredo Schmidt MD as Consulting Physician (Cardiology)          Subjective   History Present Illness     Chief Complaint:   Chief Complaint   Patient presents with   • Chest Pain       Mr. Wilburn is a 65 y.o.  presents to [facility]  with a history of stents in his heart complains of chest pain and pressure in his chest that started about 30 minutes before her ER evaluation  He denied any provoking or alleviating factors No particular trigger.  He denies neck arm or jaw pain or radiation.  He denies shortness of breath nausea/vomiting.   He took 3 sprays of nitroglycerin and had symptom improvement   He denies any cough congestion fever chills.  He denies foreign travels  He denies leg pain or swelling.    Past cardiac history: multiple PCIs in [place] in the past                                    NSTEMI and MIGUEL to RCA 6/18/15 and LCX 2015//cath 2015                                     Patent stents in RCA and LCX,                                    Borderline disease in ramus intermedius and moderate disease in proximal LAD             History of Present Illness  Review of Systems   Constitutional: Negative for chills and fever.   HENT: Negative for congestion and sinus pressure.    Eyes: Negative for photophobia and visual disturbance.   Respiratory: Positive for chest tightness. Negative for shortness of breath.    Cardiovascular: Positive for chest pain. Negative for leg swelling.   Gastrointestinal: Negative for abdominal pain and vomiting.   Endocrine: Negative for cold intolerance and heat intolerance.   Genitourinary: Negative for difficulty urinating and dysuria.   Musculoskeletal:  Negative for arthralgias and back pain.   Skin: Negative for color change and pallor.   Neurological: Negative for dizziness and light-headedness.   Psychiatric/Behavioral: Negative for agitation and behavioral problems.     ROS        Personal History     Past Medical History:   Past Medical History:   Diagnosis Date   • Acute bronchitis    • Acute myocardial infarction, subsequent episode of care (CMS/[facility])    • Acute non-recurrent frontal sinusitis    • Acute right-sided low back pain with right-sided sciatica     Impression: persistent, long standing back pain, worse, with rle radiculopathy, h/o vertebral fx, refractory to pt xray with severe degenerative changes l5/ s1 check mri, referrall to dr amarilys arias 20 minutes bid nsaids Rehabilitation exercises discussed. continue pt   • Allergic rhinitis    • Allergy to poison ivy     Impression: Due to the wide spread rash he was started on Prednisone per pt request. He was advised to RTC if his symptoms did not improve.   • Annual visit for general adult medical examination with abnormal findings     Impression: doing well, vaccines current Age specific anticipatory guidance and warning signs discussed. Diet, exercise, and lifestyle modification discussed. Safety, seatbelts, and routine screening examinations discussed. Discussed self-examinations.   • Bronchitis    • Cellulitis of buttock     Impression: possibly 2nd insect bite Topical care discussed. Discussed possible necessity of I and D. Call / return if fever, worsening symptoms.   • Chest pain     Impression: atypical, improved / resolved currently possibly 2nd nausea /gi upset /viral uri serial ekg's, troponin's normal followed by cardiology in [place], he reports negative treadmill cardiolyte 12/11 d/c to home, Follow up 2 to 3 days. Follow up with cardiology planned consider repeat stress test if chest pain on exertion, worsening symptoms.   • Colon cancer screening     Impression: has a colonoscopy, will  get records   • Conjunctivitis     Impression: viral Topical care discussed. wash hands frequently   • Coronary atherosclerosis     Impression: h/o stent 2003, 2016 followed by meng, had recent neg stress test, will get records h/o normal carptid doppler per her report, will get records continue coreg, statin, effient continue risk factor reduction recommend cardiology Follow up he states h3 will consider   • GERD (gastroesophageal reflux disease)    • Hematoma     Impression: hand, much improved xray neg for fracture per ed Signs and symptoms of infecton discussed. Call / return if worsening symptoms.   • Hepatitis B     Impression: h/o, recheck levels   • Herpes zoster     Impression: topical care discussed cover with antibiotics Follow up for zostavax   • History of tobacco use    • HTN (hypertension), benign     Impression: good control Discussed low sodium diet, lifestyle modification. Follow up recheck   • Hyperlipidemia     Impression: followed by Roxana blackman tolerating crestor rx, ldl to 71, + aggressive ldl target considering cad recheck   • Lumbar disc disease     Impression: followed by Nick Ashton undergoing epidual injxn   • Malaise and fatigue     Impression: check vitamin levels h/o low t do not recommend replacement consider age enedelia under good cobtrol consider wellbutrin Follow up recheck Call / return if worsening symptoms.   • Myalgia     Impression: possibly 2nd crestor, hold x 1 to 2 mos risk/benefit RX discussed, considering restart possibly at lower dose   • Nevus     Impression: path shows benign lentigo Discussed skin care, use of sun block and protective clothing.   • Obesity    • Obesity, morbid, BMI 40.0-49.9 (CMS/[facility])     DOCUMENTATION OF FOLLOW-UP PLAN FOR PATIENT WITH BMI ABOVE NORMAL ()   • Overweight (BMI 25.0-29.9)     Impression: Discussed diet, exercise, lifestyle modification. Follow up [id number]   • Pruritus    • Right hip pain     Impression: check xray   •  Screening for depression     Negative Depression Screening (4 or less) ()   • Screening PSA (prostate specific antigen)     Impression: psa normal / damián normal   • Sinusitis, bacterial     Impression: He was prescribed Cipro and Tessalon perles. Increase fluids. Tylenol/motrin for pain or fever. Medication and medication adverse effects discussed. Follow-up 5-7 days for reevaluation if not improved or sooner if needed.   • Skin lesion     Impression: rec resection / derm ref sched   • Sleep apnea, obstructive     Story: on CPAP   • URI (upper respiratory infection)     Impression: Increase fluid intake. Mucinex Call / return if fever, respiratory difficulty, worsening symptoms.   • Vertiginous syndrome and labyrinthine disorder     Impression: Differential diagnosis discussed. Follow-up in 2 weeks if not better. Follow-up sooner for worsening symptoms or for any concerns. Zyrtec as directed.   • Vertigo     Impression: likely secondary to sinusitis with eustachian tube dysfunction, rx in progress ddx includes vestibular neuritis, cover with prednisone Follow up recheck Consider imaging if persistent symptoms.   • Vitamin D deficiency        Surgical History:      Past Surgical History:   Procedure Laterality Date   • CATARACT EXTRACTION, BILATERAL Bilateral    • CORONARY STENT PLACEMENT  2003   • EYE SURGERY  10/16/2019    repair detached retina   • NASAL SEPTUM SURGERY             Family History: family history includes Diabetes in his mother; Heart attack in his father; Heart disease in his mother; Hypertension in his mother; No Known Problems in his brother, maternal aunt, maternal grandfather, maternal grandmother, maternal uncle, paternal aunt, paternal grandfather, paternal grandmother, paternal uncle, sister, and another family member; Rheumatic fever in his father. Otherwise pertinent FHx was reviewed and unremarkable.     Social History:  reports that he quit smoking about 30 years ago. His smoking use  included cigarettes. He has a 10.00 pack-year smoking history. He has never used smokeless tobacco. He reports that he drinks alcohol. He reports that he does not use drugs.      Medications:  Prior to Admission medications    Medication Sig Start Date End Date Taking? Authorizing Provider   aspirin 325 MG tablet Take 1 tablet by mouth Daily. 8/11/15  Yes Pinky Gaines MD   carvedilol (COREG) 25 MG tablet Take 1 tablet by mouth 2 (Two) Times a Day. 1/27/20  Yes Kian Weston MD   Cholecalciferol (VITAMIN D) 50 MCG (2000 UT) tablet Take 1 tablet by mouth Daily. 8/11/15  Yes Pinky Gaines MD   enalapril (VASOTEC) 5 MG tablet Take 1 tablet by mouth 2 (Two) Times a Day. 1/27/20  Yes Kian Weston MD   esomeprazole (nexIUM) 20 MG capsule Take 20 mg by mouth Every Morning Before Breakfast.   Yes Pinky Gaines MD   rosuvastatin (CRESTOR) 20 MG tablet Take 1 tablet by mouth Daily. 1/27/20  Yes Kian Weston MD   cyclobenzaprine (FLEXERIL) 10 MG tablet Take 1 tablet by mouth At Night As Needed for Muscle Spasms. 1/27/20   Kian Weston MD   fluticasone (Flonase) 50 MCG/ACT nasal spray 2 sprays into the nostril(s) as directed by provider Daily.  Patient taking differently: 2 sprays into the nostril(s) as directed by provider Daily As Needed. 7/31/20   Saritha Vitale MD   nitroglycerin (NITROLINGUAL) 0.4 MG/SPRAY spray Place 1 spray under the tongue Every 5 (Five) Minutes As Needed for Chest Pain. 1/27/20   Kian Weston MD   methylPREDNISolone (MEDROL, NI,) 4 MG tablet Take as directed on package instructions. 7/31/20 8/7/20  Saritha Vitale MD   ofloxacin (OCUFLOX) 0.3 % ophthalmic solution Administer 1 drop into the left eye As Needed. 10/15/19 8/7/20  Pinky Gaines MD       Allergies:    Allergies   Allergen Reactions   • Vicodin [Hydrocodone-Acetaminophen] Hives   • Atorvastatin Hives   • Propoxyphene Hives       Objective   Objective     Vital  Signs  Temp:  [97.8 °F (36.6 °C)-98 °F (36.7 °C)] 97.8 °F (36.6 °C)  Heart Rate:  [72-78] 75  Resp:  [16-17] 17  BP: (113-134)/(63-81) 132/81  SpO2:  [96 %-99 %] 98 %  on   ;   Device (Oxygen Therapy): room air  Body mass index is 38.22 kg/m².      Constitutional: He appears well-developed.   Eyes: EOM are normal.   Neck: Normal range of motion.   Cardiovascular: Normal rate.   Pulmonary/Chest: Effort normal.   Abdominal: Soft.   Musculoskeletal: Normal range of motion.   Neurological: He is alert.   Skin: Skin is warm.   Psychiatric: He has a normal mood and affect.   Nursing note and vitals reviewed.    Physical Exam    Results Review:  I have personally reviewed most recent cardiac tracings, lab results and radiology images and interpretations and agree with findings      Results from last 7 days   Lab Units 08/07/20  0952   WBC 10*3/mm3 7.80   HEMOGLOBIN g/dL 14.4   HEMATOCRIT % 43.1   PLATELETS 10*3/mm3 220     Results from last 7 days   Lab Units 08/07/20  0952   SODIUM mmol/L 138   POTASSIUM mmol/L 4.4   CHLORIDE mmol/L 100   CO2 mmol/L 29.0   BUN  19   CREATININE mg/dL 1.21   GLUCOSE mg/dL 119*   CALCIUM mg/dL 9.1   TROPONIN T ng/mL <0.010     Estimated Creatinine Clearance: 74.3 mL/min (by C-G formula based on SCr of 1.21 mg/dL).  Brief Urine Lab Results     None          Microbiology Results (last 10 days)     ** No results found for the last 240 hours. **          ECG/EMG Results (most recent)     Procedure Component Value Units Date/Time    ECG 12 Lead [[id number]] Collected:  08/07/20 0928     Updated:  08/07/20 0929    Narrative:       HEART RATE= 77  bpm  RR Interval= 776  ms  OH Interval= 158  ms  P Horizontal Axis= 6  deg  P Front Axis= 41  deg  QRSD Interval= 94  ms  QT Interval= 380  ms  QRS Axis= 15  deg  T Wave Axis= 34  deg  - ABNORMAL ECG -  Sinus rhythm  Inferior infarct, old  Electronically Signed By:   Date and Time of Study: 2020-08-07 09:28:04                    Xr Chest 1 View    Result  Date: 8/7/2020  No acute cardiopulmonary abnormality.  Electronically Signed By-Dax Royal On:8/7/2020 10:58 AM This report was finalized on [id number] by  Dax Royal, .        Estimated Creatinine Clearance: 74.3 mL/min (by C-G formula based on SCr of 1.21 mg/dL).    Assessment/Plan   Assessment/Plan       # ACS    Past cardiac history: multiple PCIs in [place] in the past  NSTEMI and MIGUEL to RCA 6/18/15 and LCX 06/24/2015//cath 7/8/2015  Patent stents in RCA and LCX,  Borderline disease in ramus intermedius and moderate disease in proximal LAD      Rule out for MI by serial troponins  Cardiac evaluation  Echocardiogram  Nitroglycerin as needed for symptom control  Lovenox  Telemetry  Cardiac diet      Active Hospital Problems    Diagnosis  POA   • Unstable angina (CMS/HCC) [I20.0]  Yes      Resolved Hospital Problems   No resolved problems to display.                 VTE Prophylaxis -   Mechanical Order History:     None      Pharmalogical Order History:     Ordered     Dose Route Frequency Stop    08/07/20 1702  enoxaparin (LOVENOX) syringe 40 mg  Status:  Discontinued      40 mg SC Every 24 Hours Scheduled 08/07/20 1703    08/07/20 1704  Pharmacy to Dose enoxaparin (LOVENOX)      -- XX Continuous PRN --    08/07/20 1055  enoxaparin (LOVENOX) syringe 110 mg      1 mg/kg SC Once 08/07/20 1144          CODE STATUS:    Code Status and Medical Interventions:   Ordered at: 08/07/20 1702     Level Of Support Discussed With:    Patient     Code Status:    CPR     Medical Interventions (Level of Support Prior to Arrest):    Full       This patient has been examined wearing appropriate Personal Protective Equipment and discussed with hospital infection control department. 08/07/20      I discussed the patient's findings and my recommendations with patient.        Electronically signed by Roberta Vargas MD, 08/07/20, 5:08 PM.  [facility] Hospitalist Team

## 2020-08-07 NOTE — ED PROVIDER NOTES
Subjective   Chief complaint chest pain    History of present illness 65-year-old male with a history of stents in his heart complains of chest pain and pressure in his chest that started about 30 minutes ago.  No particular trigger.  He states that there is no neck arm or jaw pain or shortness of breath some nausea.  He took 3 sprays of nitroglycerin and now feels better.  No recent cough congestion fever chills no foreign travels leg pain or swelling.          Review of Systems   Constitutional: Negative for chills and fever.   HENT: Negative for congestion and sinus pressure.    Eyes: Negative for photophobia and visual disturbance.   Respiratory: Positive for chest tightness. Negative for shortness of breath.    Cardiovascular: Positive for chest pain. Negative for leg swelling.   Gastrointestinal: Negative for abdominal pain and vomiting.   Endocrine: Negative for cold intolerance and heat intolerance.   Genitourinary: Negative for difficulty urinating and dysuria.   Musculoskeletal: Negative for arthralgias and back pain.   Skin: Negative for color change and pallor.   Neurological: Negative for dizziness and light-headedness.   Psychiatric/Behavioral: Negative for agitation and behavioral problems.       Past Medical History:   Diagnosis Date   • Acute bronchitis    • Acute myocardial infarction, subsequent episode of care (CMS/[facility])    • Acute non-recurrent frontal sinusitis    • Acute right-sided low back pain with right-sided sciatica     Impression: persistent, long standing back pain, worse, with rle radiculopathy, h/o vertebral fx, refractory to pt xray with severe degenerative changes l5/ s1 check mri, referrall to dr amarilys arias 20 minutes bid nsaids Rehabilitation exercises discussed. continue pt   • Allergic rhinitis    • Allergy to poison ivy     Impression: Due to the wide spread rash he was started on Prednisone per pt request. He was advised to RTC if his symptoms did not improve.   • Annual visit  for general adult medical examination with abnormal findings     Impression: doing well, vaccines current Age specific anticipatory guidance and warning signs discussed. Diet, exercise, and lifestyle modification discussed. Safety, seatbelts, and routine screening examinations discussed. Discussed self-examinations.   • Bronchitis    • Cellulitis of buttock     Impression: possibly 2nd insect bite Topical care discussed. Discussed possible necessity of I and D. Call / return if fever, worsening symptoms.   • Chest pain     Impression: atypical, improved / resolved currently possibly 2nd nausea /gi upset /viral uri serial ekg's, troponin's normal followed by cardiology in [place], he reports negative treadmill cardiolyte 12/11 d/c to home, Follow up 2 to 3 days. Follow up with cardiology planned consider repeat stress test if chest pain on exertion, worsening symptoms.   • Colon cancer screening     Impression: has a colonoscopy, will get records   • Conjunctivitis     Impression: viral Topical care discussed. wash hands frequently   • Coronary atherosclerosis     Impression: h/o stent 2003, 2016 followed by meng, had recent neg stress test, will get records h/o normal carptid doppler per her report, will get records continue coreg, statin, effient continue risk factor reduction recommend cardiology Follow up he states h3 will consider   • GERD (gastroesophageal reflux disease)    • Hematoma     Impression: hand, much improved xray neg for fracture per ed Signs and symptoms of infecton discussed. Call / return if worsening symptoms.   • Hepatitis B     Impression: h/o, recheck levels   • Herpes zoster     Impression: topical care discussed cover with antibiotics Follow up for zostavax   • History of tobacco use    • HTN (hypertension), benign     Impression: good control Discussed low sodium diet, lifestyle modification. Follow up recheck   • Hyperlipidemia     Impression: followed by Roxana improved tolerating  crestor rx, ldl to 71, + aggressive ldl target considering cad recheck   • Lumbar disc disease     Impression: followed by Nick Ashton undergoing epidual injxn   • Malaise and fatigue     Impression: check vitamin levels h/o low t do not recommend replacement consider age enedelia under good cobtrol consider wellbutrin Follow up recheck Call / return if worsening symptoms.   • Myalgia     Impression: possibly 2nd crestor, hold x 1 to 2 mos risk/benefit RX discussed, considering restart possibly at lower dose   • Nevus     Impression: path shows benign lentigo Discussed skin care, use of sun block and protective clothing.   • Obesity    • Obesity, morbid, BMI 40.0-49.9 (CMS/[facility])     DOCUMENTATION OF FOLLOW-UP PLAN FOR PATIENT WITH BMI ABOVE NORMAL ()   • Overweight (BMI 25.0-29.9)     Impression: Discussed diet, exercise, lifestyle modification. Follow up [email or web address]   • Pruritus    • Right hip pain     Impression: check xray   • Screening for depression     Negative Depression Screening (4 or less) ()   • Screening PSA (prostate specific antigen)     Impression: psa normal / damián normal   • Sinusitis, bacterial     Impression: He was prescribed Cipro and Tessalon perles. Increase fluids. Tylenol/motrin for pain or fever. Medication and medication adverse effects discussed. Follow-up 5-7 days for reevaluation if not improved or sooner if needed.   • Skin lesion     Impression: rec resection / derm ref sched   • Sleep apnea, obstructive     Story: on CPAP   • URI (upper respiratory infection)     Impression: Increase fluid intake. Mucinex Call / return if fever, respiratory difficulty, worsening symptoms.   • Vertiginous syndrome and labyrinthine disorder     Impression: Differential diagnosis discussed. Follow-up in 2 weeks if not better. Follow-up sooner for worsening symptoms or for any concerns. Zyrtec as directed.   • Vertigo     Impression: likely secondary to sinusitis with eustachian tube dysfunction, rx in  progress ddx includes vestibular neuritis, cover with prednisone Follow up recheck Consider imaging if persistent symptoms.   • Vitamin D deficiency        Allergies   Allergen Reactions   • Atorvastatin Hives   • Propoxyphene Hives       Past Surgical History:   Procedure Laterality Date   • CORONARY STENT PLACEMENT     • EYE SURGERY  10/16/2019    repair detached retina       Family History   Problem Relation Age of Onset   • Hypertension Mother    • Diabetes Mother    • Heart attack Father    • No Known Problems Sister    • No Known Problems Brother    • No Known Problems Maternal Aunt    • No Known Problems Maternal Uncle    • No Known Problems Paternal Aunt    • No Known Problems Paternal Uncle    • No Known Problems Maternal Grandmother    • No Known Problems Maternal Grandfather    • No Known Problems Paternal Grandmother    • No Known Problems Paternal Grandfather    • No Known Problems Other    • Anemia Neg Hx    • Arrhythmia Neg Hx    • Asthma Neg Hx    • Clotting disorder Neg Hx    • Fainting Neg Hx    • Heart disease Neg Hx    • Heart failure Neg Hx    • Hyperlipidemia Neg Hx        Social History     Socioeconomic History   • Marital status:      Spouse name: Not on file   • Number of children: Not on file   • Years of education: Not on file   • Highest education level: Not on file   Tobacco Use   • Smoking status: Former Smoker     Last attempt to quit: 1990     Years since quittin.6   • Smokeless tobacco: Never Used   Substance and Sexual Activity   • Alcohol use: Yes     Comment: Occasional   • Drug use: No   • Sexual activity: Defer       Prior to Admission medications    Medication Sig Start Date End Date Taking? Authorizing Provider   aspirin 325 MG tablet Take 1 tablet by mouth Daily. 8/11/15   Provider, MD Pinky   carvedilol (COREG) 25 MG tablet Take 1 tablet by mouth 2 (Two) Times a Day. 20   Kian Weston MD   Cholecalciferol (VITAMIN D) 50 MCG ( UT) tablet  Take 1 tablet by mouth Daily. 8/11/15   Pinky Gaines MD   cyclobenzaprine (FLEXERIL) 10 MG tablet Take 1 tablet by mouth At Night As Needed for Muscle Spasms. 1/27/20   Kian Weston MD   enalapril (VASOTEC) 5 MG tablet Take 1 tablet by mouth 2 (Two) Times a Day. 1/27/20   Kian Weston MD   fluticasone (Flonase) 50 MCG/ACT nasal spray 2 sprays into the nostril(s) as directed by provider Daily. 7/31/20   Saritha Vitale MD   methylPREDNISolone (MEDROL, NI,) 4 MG tablet Take as directed on package instructions. 7/31/20   Saritha Vitale MD   nitroglycerin (NITROLINGUAL) 0.4 MG/SPRAY spray Place 1 spray under the tongue Every 5 (Five) Minutes As Needed for Chest Pain. 1/27/20   Kian Weston MD   ofloxacin (OCUFLOX) 0.3 % ophthalmic solution Administer 1 drop into the left eye As Needed. 10/15/19   Pinky Gaines MD   rosuvastatin (CRESTOR) 20 MG tablet Take 1 tablet by mouth Daily. 1/27/20   Kain Weston MD         Objective   Physical Exam  65-year-old male awake alert no acute distress HEENT extraocular muscles intact sclera clear neck supple no adenopathy no JVD no bruits lungs clear no retraction heart regular without murmur abdomen soft no tenderness no masses extremities pulses are equal throughout upper and lower extremities no edema cords or Homans sign or evidence of DVT.  Patient's awake alert follows commands motor strength normal without focal weakness  Procedures           ED Course      Results for orders placed or performed during the hospital encounter of 08/07/20   Basic Metabolic Panel   Result Value Ref Range    Glucose 119 (H) 65 - 99 mg/dL    BUN      Creatinine 1.21 0.76 - 1.27 mg/dL    Sodium 138 136 - 145 mmol/L    Potassium 4.4 3.5 - 5.2 mmol/L    Chloride 100 98 - 107 mmol/L    CO2 29.0 22.0 - 29.0 mmol/L    Calcium 9.1 8.6 - 10.5 mg/dL    eGFR Non African Amer 61 >60 mL/min/1.73    BUN/Creatinine Ratio      Anion Gap 9.0 5.0 - 15.0 mmol/L    Troponin   Result Value Ref Range    Troponin T <0.010 0.000 - 0.030 ng/mL   CBC Auto Differential   Result Value Ref Range    WBC 7.80 3.40 - 10.80 10*3/mm3    RBC 4.88 4.14 - 5.80 10*6/mm3    Hemoglobin 14.4 13.0 - 17.7 g/dL    Hematocrit 43.1 37.5 - 51.0 %    MCV 88.2 79.0 - 97.0 fL    MCH 29.6 26.6 - 33.0 pg    MCHC 33.5 31.5 - 35.7 g/dL    RDW 14.6 12.3 - 15.4 %    RDW-SD 44.2 37.0 - 54.0 fl    MPV 7.2 6.0 - 12.0 fL    Platelets 220 140 - 450 10*3/mm3    Neutrophil % 61.3 42.7 - 76.0 %    Lymphocyte % 24.5 19.6 - 45.3 %    Monocyte % 10.9 5.0 - 12.0 %    Eosinophil % 2.3 0.3 - 6.2 %    Basophil % 1.0 0.0 - 1.5 %    Neutrophils, Absolute 4.80 1.70 - 7.00 10*3/mm3    Lymphocytes, Absolute 1.90 0.70 - 3.10 10*3/mm3    Monocytes, Absolute 0.80 0.10 - 0.90 10*3/mm3    Eosinophils, Absolute 0.20 0.00 - 0.40 10*3/mm3    Basophils, Absolute 0.10 0.00 - 0.20 10*3/mm3    nRBC 0.2 0.0 - 0.2 /100 WBC   BUN   Result Value Ref Range    BUN 19 8 - 23 mg/dL     No radiology results for the last day  Medications   sodium chloride 0.9 % flush 10 mL (has no administration in time range)   nitroglycerin (TRIDIL) 200 mcg/ml infusion (10 mcg/min Intravenous New Bag 8/7/20 0993)   enoxaparin (LOVENOX) syringe 110 mg (has no administration in time range)   aspirin tablet 325 mg (325 mg Oral Given 8/7/20 0940)     EKG my interpretation normal sinus rhythm rate of 77 normal axis hypertrophy patient has evidence of previous inferior infarct unchanged from previous EKG abnormal                                       MDM  Number of Diagnoses or Management Options  Unstable angina (CMS/[facility]):   Diagnosis management comments: Medical decision making.  Patient had IV established placed on monitor given aspirin p.o. and started on IV nitroglycerin.  Patient's pain went away completely.  His CBC electrolytes troponin was unremarkable EKG unchanged chest x-ray I do not see any acute changes.  The patient repeat examination at 10:50 AM was  resting comfortably no distress.  The patient's first troponin is negative but he had only had pain about 30 minutes prior to arrival.  He has a few stents in his heart.  He is had some angina today I see no evidence that suggest acute DVT pulmonary mesenteric dissection by clinical exam.  He was made aware of the findings hospitalist paged he will be placed in the hospital for further work-up and care.  Was given Lovenox 1 mg/kg subcutaneously for unstable angina.       Amount and/or Complexity of Data Reviewed  Clinical lab tests: reviewed  Tests in the medicine section of CPT®: reviewed        Final diagnoses:   Unstable angina (CMS/[facility])            Kian Rodríguez MD  08/07/20 1100

## 2020-08-08 ENCOUNTER — APPOINTMENT (OUTPATIENT)
Dept: NUCLEAR MEDICINE | Facility: HOSPITAL | Age: 65
End: 2020-08-08

## 2020-08-08 ENCOUNTER — READMISSION MANAGEMENT (OUTPATIENT)
Dept: CALL CENTER | Facility: HOSPITAL | Age: 65
End: 2020-08-08

## 2020-08-08 VITALS
HEIGHT: 67 IN | WEIGHT: 244.71 LBS | BODY MASS INDEX: 38.41 KG/M2 | SYSTOLIC BLOOD PRESSURE: 133 MMHG | OXYGEN SATURATION: 98 % | HEART RATE: 72 BPM | RESPIRATION RATE: 15 BRPM | DIASTOLIC BLOOD PRESSURE: 86 MMHG | TEMPERATURE: 98.7 F

## 2020-08-08 PROBLEM — R07.9 CHEST PAIN IN ADULT: Status: ACTIVE | Noted: 2020-08-08

## 2020-08-08 LAB
ALBUMIN SERPL-MCNC: 4 G/DL (ref 3.5–5.2)
ALBUMIN/GLOB SERPL: 1.8 G/DL
ALP SERPL-CCNC: 52 U/L (ref 39–117)
ALT SERPL W P-5'-P-CCNC: 31 U/L (ref 1–41)
ANION GAP SERPL CALCULATED.3IONS-SCNC: 11 MMOL/L (ref 5–15)
AST SERPL-CCNC: 23 U/L (ref 1–40)
BASOPHILS # BLD AUTO: 0.1 10*3/MM3 (ref 0–0.2)
BASOPHILS NFR BLD AUTO: 0.9 % (ref 0–1.5)
BH CV ECHO MEAS - ACS: 2.4 CM
BH CV ECHO MEAS - AO MAX PG (FULL): 3.5 MMHG
BH CV ECHO MEAS - AO MAX PG: 9.2 MMHG
BH CV ECHO MEAS - AO MEAN PG (FULL): 2.4 MMHG
BH CV ECHO MEAS - AO MEAN PG: 6.4 MMHG
BH CV ECHO MEAS - AO ROOT AREA (BSA CORRECTED): 1.4
BH CV ECHO MEAS - AO ROOT AREA: 7.4 CM^2
BH CV ECHO MEAS - AO ROOT DIAM: 3.1 CM
BH CV ECHO MEAS - AO V2 MAX: 151.9 CM/SEC
BH CV ECHO MEAS - AO V2 MEAN: 123.6 CM/SEC
BH CV ECHO MEAS - AO V2 VTI: 30.6 CM
BH CV ECHO MEAS - AORTIC HR: 87.6 BPM
BH CV ECHO MEAS - AORTIC R-R: 0.69 SEC
BH CV ECHO MEAS - ASC AORTA: 3.3 CM
BH CV ECHO MEAS - AVA(I,A): 3.8 CM^2
BH CV ECHO MEAS - AVA(I,D): 3.8 CM^2
BH CV ECHO MEAS - AVA(V,A): 3.4 CM^2
BH CV ECHO MEAS - AVA(V,D): 3.4 CM^2
BH CV ECHO MEAS - BSA(HAYCOCK): 2.3 M^2
BH CV ECHO MEAS - BSA: 2.2 M^2
BH CV ECHO MEAS - BZI_BMI: 38.2 KILOGRAMS/M^2
BH CV ECHO MEAS - BZI_METRIC_HEIGHT: 170.2 CM
BH CV ECHO MEAS - BZI_METRIC_WEIGHT: 110.7 KG
BH CV ECHO MEAS - CI(AO): 9 L/MIN/M^2
BH CV ECHO MEAS - CI(LVOT): 4.7 L/MIN/M^2
BH CV ECHO MEAS - CO(AO): 19.8 L/MIN
BH CV ECHO MEAS - CO(LVOT): 10.3 L/MIN
BH CV ECHO MEAS - EDV(CUBED): 108.1 ML
BH CV ECHO MEAS - EDV(MOD-SP2): 44.4 ML
BH CV ECHO MEAS - EDV(MOD-SP4): 90.7 ML
BH CV ECHO MEAS - EDV(TEICH): 105.6 ML
BH CV ECHO MEAS - EF(CUBED): 54.7 %
BH CV ECHO MEAS - EF(MOD-BP): 62 %
BH CV ECHO MEAS - EF(MOD-SP2): 56.8 %
BH CV ECHO MEAS - EF(MOD-SP4): 63.3 %
BH CV ECHO MEAS - EF(TEICH): 46.5 %
BH CV ECHO MEAS - ESV(CUBED): 48.9 ML
BH CV ECHO MEAS - ESV(MOD-SP2): 19.2 ML
BH CV ECHO MEAS - ESV(MOD-SP4): 33.3 ML
BH CV ECHO MEAS - ESV(TEICH): 56.6 ML
BH CV ECHO MEAS - FS: 23.2 %
BH CV ECHO MEAS - IVS/LVPW: 1.3
BH CV ECHO MEAS - IVSD: 1.3 CM
BH CV ECHO MEAS - LA DIMENSION(2D): 3.6 CM
BH CV ECHO MEAS - LV DIASTOLIC VOL/BSA (35-75): 41.2 ML/M^2
BH CV ECHO MEAS - LV MASS(C)D: 208.7 GRAMS
BH CV ECHO MEAS - LV MASS(C)DI: 94.8 GRAMS/M^2
BH CV ECHO MEAS - LV MAX PG: 5.8 MMHG
BH CV ECHO MEAS - LV MEAN PG: 4.1 MMHG
BH CV ECHO MEAS - LV SYSTOLIC VOL/BSA (12-30): 15.1 ML/M^2
BH CV ECHO MEAS - LV V1 MAX: 120 CM/SEC
BH CV ECHO MEAS - LV V1 MEAN: 99.3 CM/SEC
BH CV ECHO MEAS - LV V1 VTI: 27.6 CM
BH CV ECHO MEAS - LVIDD: 4.8 CM
BH CV ECHO MEAS - LVIDS: 3.7 CM
BH CV ECHO MEAS - LVOT AREA: 4.3 CM^2
BH CV ECHO MEAS - LVOT DIAM: 2.3 CM
BH CV ECHO MEAS - LVPWD: 1 CM
BH CV ECHO MEAS - MV A MAX VEL: 88.7 CM/SEC
BH CV ECHO MEAS - MV DEC SLOPE: 159.7 CM/SEC^2
BH CV ECHO MEAS - MV DEC TIME: 0.38 SEC
BH CV ECHO MEAS - MV E MAX VEL: 60 CM/SEC
BH CV ECHO MEAS - MV E/A: 0.68
BH CV ECHO MEAS - MV MAX PG: 4 MMHG
BH CV ECHO MEAS - MV MEAN PG: 2.5 MMHG
BH CV ECHO MEAS - MV V2 MAX: 100.1 CM/SEC
BH CV ECHO MEAS - MV V2 MEAN: 77.7 CM/SEC
BH CV ECHO MEAS - MV V2 VTI: 26 CM
BH CV ECHO MEAS - MVA(VTI): 4.5 CM^2
BH CV ECHO MEAS - PA ACC TIME: 0.1 SEC
BH CV ECHO MEAS - PA MAX PG (FULL): 1 MMHG
BH CV ECHO MEAS - PA MAX PG: 4.3 MMHG
BH CV ECHO MEAS - PA MEAN PG (FULL): 0.84 MMHG
BH CV ECHO MEAS - PA MEAN PG: 3 MMHG
BH CV ECHO MEAS - PA PR(ACCEL): 36.2 MMHG
BH CV ECHO MEAS - PA V2 MAX: 103.1 CM/SEC
BH CV ECHO MEAS - PA V2 MEAN: 84 CM/SEC
BH CV ECHO MEAS - PA V2 VTI: 20.4 CM
BH CV ECHO MEAS - PULM DIAS VEL: 37.4 CM/SEC
BH CV ECHO MEAS - PULM S/D: 1.4
BH CV ECHO MEAS - PULM SYS VEL: 50.8 CM/SEC
BH CV ECHO MEAS - PVA(I,A): 5.7 CM^2
BH CV ECHO MEAS - PVA(I,D): 5.7 CM^2
BH CV ECHO MEAS - PVA(V,A): 5 CM^2
BH CV ECHO MEAS - PVA(V,D): 5 CM^2
BH CV ECHO MEAS - QP/QS: 0.99
BH CV ECHO MEAS - RAP SYSTOLE: 3 MMHG
BH CV ECHO MEAS - RV MAX PG: 3.3 MMHG
BH CV ECHO MEAS - RV MEAN PG: 2.1 MMHG
BH CV ECHO MEAS - RV V1 MAX: 90.3 CM/SEC
BH CV ECHO MEAS - RV V1 MEAN: 69.8 CM/SEC
BH CV ECHO MEAS - RV V1 VTI: 20.5 CM
BH CV ECHO MEAS - RVDD: 3.1 CM
BH CV ECHO MEAS - RVOT AREA: 5.7 CM^2
BH CV ECHO MEAS - RVOT DIAM: 2.7 CM
BH CV ECHO MEAS - RVSP: 15.7 MMHG
BH CV ECHO MEAS - SI(AO): 102.7 ML/M^2
BH CV ECHO MEAS - SI(CUBED): 26.9 ML/M^2
BH CV ECHO MEAS - SI(LVOT): 53.3 ML/M^2
BH CV ECHO MEAS - SI(MOD-SP2): 11.5 ML/M^2
BH CV ECHO MEAS - SI(MOD-SP4): 26.1 ML/M^2
BH CV ECHO MEAS - SI(TEICH): 22.3 ML/M^2
BH CV ECHO MEAS - SV(AO): 225.9 ML
BH CV ECHO MEAS - SV(CUBED): 59.1 ML
BH CV ECHO MEAS - SV(LVOT): 117.4 ML
BH CV ECHO MEAS - SV(MOD-SP2): 25.2 ML
BH CV ECHO MEAS - SV(MOD-SP4): 57.4 ML
BH CV ECHO MEAS - SV(RVOT): 116.6 ML
BH CV ECHO MEAS - SV(TEICH): 49.1 ML
BH CV ECHO MEAS - TR MAX VEL: 176.7 CM/SEC
BH CV NUCLEAR PRIOR STUDY: 3
BH CV STRESS BP STAGE 1: NORMAL
BH CV STRESS COMMENTS STAGE 1: NORMAL
BH CV STRESS DOSE REGADENOSON STAGE 1: 0.4
BH CV STRESS DURATION MIN STAGE 1: 0
BH CV STRESS DURATION SEC STAGE 1: 10
BH CV STRESS HR STAGE 1: 103
BH CV STRESS PROTOCOL 1: NORMAL
BH CV STRESS RECOVERY BP: NORMAL MMHG
BH CV STRESS RECOVERY HR: 92 BPM
BH CV STRESS STAGE 1: 1
BILIRUB SERPL-MCNC: 0.5 MG/DL (ref 0–1.2)
BUN SERPL-MCNC: 14 MG/DL (ref 8–23)
BUN SERPL-MCNC: ABNORMAL MG/DL
BUN/CREAT SERPL: ABNORMAL
CALCIUM SPEC-SCNC: 8.3 MG/DL (ref 8.6–10.5)
CHLORIDE SERPL-SCNC: 104 MMOL/L (ref 98–107)
CHOLEST SERPL-MCNC: 114 MG/DL (ref 0–200)
CO2 SERPL-SCNC: 25 MMOL/L (ref 22–29)
CREAT SERPL-MCNC: 0.92 MG/DL (ref 0.76–1.27)
DEPRECATED RDW RBC AUTO: 44.6 FL (ref 37–54)
EOSINOPHIL # BLD AUTO: 0.2 10*3/MM3 (ref 0–0.4)
EOSINOPHIL NFR BLD AUTO: 2.5 % (ref 0.3–6.2)
ERYTHROCYTE [DISTWIDTH] IN BLOOD BY AUTOMATED COUNT: 14.8 % (ref 12.3–15.4)
GFR SERPL CREATININE-BSD FRML MDRD: 83 ML/MIN/1.73
GLOBULIN UR ELPH-MCNC: 2.2 GM/DL
GLUCOSE SERPL-MCNC: 111 MG/DL (ref 65–99)
HCT VFR BLD AUTO: 42.4 % (ref 37.5–51)
HDLC SERPL-MCNC: 35 MG/DL (ref 40–60)
HGB BLD-MCNC: 14.5 G/DL (ref 13–17.7)
LDLC SERPL CALC-MCNC: 50 MG/DL (ref 0–100)
LDLC/HDLC SERPL: 1.43 {RATIO}
LV EF 2D ECHO EST: 60 %
LV EF NUC BP: 64 %
LYMPHOCYTES # BLD AUTO: 2.7 10*3/MM3 (ref 0.7–3.1)
LYMPHOCYTES NFR BLD AUTO: 34.8 % (ref 19.6–45.3)
MAXIMAL PREDICTED HEART RATE: 157 BPM
MCH RBC QN AUTO: 29.9 PG (ref 26.6–33)
MCHC RBC AUTO-ENTMCNC: 34.1 G/DL (ref 31.5–35.7)
MCV RBC AUTO: 87.8 FL (ref 79–97)
MONOCYTES # BLD AUTO: 0.8 10*3/MM3 (ref 0.1–0.9)
MONOCYTES NFR BLD AUTO: 10.1 % (ref 5–12)
NEUTROPHILS NFR BLD AUTO: 4 10*3/MM3 (ref 1.7–7)
NEUTROPHILS NFR BLD AUTO: 51.7 % (ref 42.7–76)
NRBC BLD AUTO-RTO: 0.1 /100 WBC (ref 0–0.2)
PERCENT MAX PREDICTED HR: 65.61 %
PLATELET # BLD AUTO: 193 10*3/MM3 (ref 140–450)
PMV BLD AUTO: 7.4 FL (ref 6–12)
POTASSIUM SERPL-SCNC: 4 MMOL/L (ref 3.5–5.2)
PROT SERPL-MCNC: 6.2 G/DL (ref 6–8.5)
RBC # BLD AUTO: 4.83 10*6/MM3 (ref 4.14–5.8)
SODIUM SERPL-SCNC: 140 MMOL/L (ref 136–145)
STRESS BASELINE BP: NORMAL MMHG
STRESS BASELINE HR: 78 BPM
STRESS PERCENT HR: 77 %
STRESS POST PEAK BP: NORMAL MMHG
STRESS POST PEAK HR: 103 BPM
STRESS TARGET HR: 133 BPM
TRIGL SERPL-MCNC: 144 MG/DL (ref 0–150)
TROPONIN T SERPL-MCNC: <0.01 NG/ML (ref 0–0.03)
TROPONIN T SERPL-MCNC: <0.01 NG/ML (ref 0–0.03)
VLDLC SERPL-MCNC: 28.8 MG/DL
WBC # BLD AUTO: 7.7 10*3/MM3 (ref 3.4–10.8)

## 2020-08-08 PROCEDURE — 93017 CV STRESS TEST TRACING ONLY: CPT

## 2020-08-08 PROCEDURE — 25010000002 REGADENOSON 0.4 MG/5ML SOLUTION: Performed by: INTERNAL MEDICINE

## 2020-08-08 PROCEDURE — 96361 HYDRATE IV INFUSION ADD-ON: CPT

## 2020-08-08 PROCEDURE — 96372 THER/PROPH/DIAG INJ SC/IM: CPT

## 2020-08-08 PROCEDURE — G0378 HOSPITAL OBSERVATION PER HR: HCPCS

## 2020-08-08 PROCEDURE — 96366 THER/PROPH/DIAG IV INF ADDON: CPT

## 2020-08-08 PROCEDURE — 83036 HEMOGLOBIN GLYCOSYLATED A1C: CPT | Performed by: INTERNAL MEDICINE

## 2020-08-08 PROCEDURE — 93018 CV STRESS TEST I&R ONLY: CPT | Performed by: NURSE PRACTITIONER

## 2020-08-08 PROCEDURE — 99236 HOSP IP/OBS SAME DATE HI 85: CPT | Performed by: INTERNAL MEDICINE

## 2020-08-08 PROCEDURE — 85025 COMPLETE CBC W/AUTO DIFF WBC: CPT | Performed by: INTERNAL MEDICINE

## 2020-08-08 PROCEDURE — 78452 HT MUSCLE IMAGE SPECT MULT: CPT

## 2020-08-08 PROCEDURE — 80061 LIPID PANEL: CPT | Performed by: INTERNAL MEDICINE

## 2020-08-08 PROCEDURE — A9500 TC99M SESTAMIBI: HCPCS | Performed by: INTERNAL MEDICINE

## 2020-08-08 PROCEDURE — 80053 COMPREHEN METABOLIC PANEL: CPT | Performed by: INTERNAL MEDICINE

## 2020-08-08 PROCEDURE — 84484 ASSAY OF TROPONIN QUANT: CPT | Performed by: INTERNAL MEDICINE

## 2020-08-08 PROCEDURE — 0 TECHNETIUM SESTAMIBI: Performed by: INTERNAL MEDICINE

## 2020-08-08 PROCEDURE — 25010000002 ENOXAPARIN PER 10 MG: Performed by: INTERNAL MEDICINE

## 2020-08-08 PROCEDURE — 93306 TTE W/DOPPLER COMPLETE: CPT | Performed by: INTERNAL MEDICINE

## 2020-08-08 PROCEDURE — 99243 OFF/OP CNSLTJ NEW/EST LOW 30: CPT | Performed by: INTERNAL MEDICINE

## 2020-08-08 PROCEDURE — 78452 HT MUSCLE IMAGE SPECT MULT: CPT | Performed by: INTERNAL MEDICINE

## 2020-08-08 RX ORDER — ISOSORBIDE MONONITRATE 30 MG/1
30 TABLET, EXTENDED RELEASE ORAL
Status: DISCONTINUED | OUTPATIENT
Start: 2020-08-08 | End: 2020-08-08 | Stop reason: HOSPADM

## 2020-08-08 RX ORDER — ISOSORBIDE MONONITRATE 30 MG/1
30 TABLET, EXTENDED RELEASE ORAL
Qty: 30 TABLET | Refills: 0 | Status: SHIPPED | OUTPATIENT
Start: 2020-08-08 | End: 2020-09-07

## 2020-08-08 RX ADMIN — CARVEDILOL 25 MG: 25 TABLET, FILM COATED ORAL at 13:32

## 2020-08-08 RX ADMIN — TECHNETIUM TC 99M SESTAMIBI 1 DOSE: 1 INJECTION INTRAVENOUS at 12:35

## 2020-08-08 RX ADMIN — SODIUM CHLORIDE 100 ML/HR: 900 INJECTION, SOLUTION INTRAVENOUS at 13:32

## 2020-08-08 RX ADMIN — ROSUVASTATIN CALCIUM 20 MG: 10 TABLET, FILM COATED ORAL at 08:27

## 2020-08-08 RX ADMIN — ENALAPRIL MALEATE 5 MG: 5 TABLET ORAL at 13:32

## 2020-08-08 RX ADMIN — REGADENOSON 0.4 MG: 0.08 INJECTION, SOLUTION INTRAVENOUS at 12:35

## 2020-08-08 RX ADMIN — TECHNETIUM TC 99M SESTAMIBI 1 DOSE: 1 INJECTION INTRAVENOUS at 11:30

## 2020-08-08 RX ADMIN — PANTOPRAZOLE SODIUM 40 MG: 40 TABLET, DELAYED RELEASE ORAL at 05:43

## 2020-08-08 RX ADMIN — ENOXAPARIN SODIUM 110 MG: 120 INJECTION SUBCUTANEOUS at 05:43

## 2020-08-08 NOTE — PROGRESS NOTES
[facility] Medicine Services Daily Progress Note      Hospitalist Team  LOS 0 days      Patient Care Team:  Kian Weston MD as PCP - General  Kian Weston MD as PCP - Family Medicine  Sigifredo Schmidt MD as Consulting Physician (Cardiology)    Patient Location: 101/1      Subjective   Subjective     Chief Complaint / Subjective  Chief Complaint   Patient presents with   • Chest Pain         Brief Synopsis of Hospital Course/HPI  Mr. Wilburn is a 65 y.o.  presents to [facility]  with a history of stents in his heart complains of chest pain and pressure in his chest that started about 30 minutes before her ER evaluation  He denied any provoking or alleviating factors No particular trigger.  He denies neck arm or jaw pain or radiation.  He denies shortness of breath nausea/vomiting.   He took 3 sprays of nitroglycerin and had symptom improvement   He denies any cough congestion fever chills.  He denies foreign travels  He denies leg pain or swelling.     Past cardiac history: multiple PCIs in [place] in the past  NSTEMI and MIGUEL to RCA 6/18/15 and LCX 06/24/2015//cath 7/8/2015   Patent stents in RCA and LCX,  Borderline disease in ramus intermedius and moderate disease in proximal LAD     8/8/2020:  Patient successfully ruled out for MI by cardiac enzymes  Cardiology was consulted.  Patient had a negative stress test with cardiology follow-up in 1 week         History of Present Illness  Review of Systems   Constitutional: Negative for chills and fever.   HENT: Negative for congestion and sinus pressure.    Eyes: Negative for photophobia and visual disturbance.   Respiratory: Positive for chest tightness. Negative for shortness of breath.    Cardiovascular: Positive for chest pain. Negative for leg swelling.   Gastrointestinal: Negative for abdominal pain and vomiting.   Endocrine: Negative for cold intolerance and heat intolerance.   Genitourinary: Negative  "for difficulty urinating and dysuria.   Musculoskeletal: Negative for arthralgias and back pain.   Skin: Negative for color change and pallor.   Neurological: Negative for dizziness and light-headedness.   Psychiatric/Behavioral: Negative for agitation and behavioral problems.        ROS  Date::  8/8/2020    Objective   Objective      Vital Signs  Temp:  [97.8 °F (36.6 °C)-98.5 °F (36.9 °C)] 98.3 °F (36.8 °C)  Heart Rate:  [65-96] 68  Resp:  [14-17] 14  BP: (101-147)/(56-82) 122/72  Oxygen Therapy  SpO2: 96 %  Pulse Oximetry Type: Intermittent  Device (Oxygen Therapy): room air  Device (Oxygen Therapy): room air  Flowsheet Rows      First Filed Value   Admission Height  170.2 cm (67\") Documented at 08/07/2020 0926   Admission Weight  110 kg (242 lb) Documented at 08/07/2020 0926        Intake & Output (last 3 days)       08/05 0701 - 08/06 0700 08/06 0701 - 08/07 0700 08/07 0701 - 08/08 0700 08/08 0701 - 08/09 0700    I.V. (mL/kg)   999 (9)     Total Intake(mL/kg)   999 (9)     Urine (mL/kg/hr)   1680 375 (1.6)    Total Output   1680 375    Net   -681 -375                Lines, Drains & Airways    Active LDAs     Name:   Placement date:   Placement time:   Site:   Days:    Peripheral IV 08/07/20 0952 Right Antecubital   08/07/20    0952    Antecubital   less than 1                  Physical Exam:    Physical Exam   Constitutional: He appears well-developed.   Eyes: EOM are normal.   Neck: Normal range of motion.   Cardiovascular: Normal rate.   Pulmonary/Chest: Effort normal.   Abdominal: Soft.   Musculoskeletal: Normal range of motion.   Neurological: He is alert.   Skin: Skin is warm.   Psychiatric: He has a normal mood and affect.   Nursing note and vitals reviewed.            Procedures:              Results Review:     I reviewed the patient's new clinical results.      Lab Results (last 24 hours)     Procedure Component Value Units Date/Time    Troponin [[id number]]  (Normal) Collected:  08/08/20 0637    " Specimen:  Blood Updated:  08/08/20 0810     Troponin T <0.010 ng/mL     Narrative:       Troponin T Reference Range:  <= 0.03 ng/mL-   Negative for AMI  >0.03 ng/mL-     Abnormal for myocardial necrosis.  Clinicians would have to utilize clinical acumen, EKG, Troponin and serial changes to determine if it is an Acute Myocardial Infarction or myocardial injury due to an underlying chronic condition.       Results may be falsely decreased if patient taking Biotin.      BUN [[id number]]  (Normal) Collected:  08/08/20 0102    Specimen:  Blood Updated:  08/08/20 0731     BUN 14 mg/dL     Comprehensive Metabolic Panel [[id number]]  (Abnormal) Collected:  08/08/20 0102    Specimen:  Blood Updated:  08/08/20 0153     Glucose 111 mg/dL      BUN --     Comment: Testing performed by alternate method        Creatinine 0.92 mg/dL      Sodium 140 mmol/L      Potassium 4.0 mmol/L      Comment: Specimen hemolyzed.  Results may be affected.        Chloride 104 mmol/L      CO2 25.0 mmol/L      Calcium 8.3 mg/dL      Total Protein 6.2 g/dL      Albumin 4.00 g/dL      ALT (SGPT) 31 U/L      AST (SGOT) 23 U/L      Alkaline Phosphatase 52 U/L      Total Bilirubin 0.5 mg/dL      eGFR Non African Amer 83 mL/min/1.73      Globulin 2.2 gm/dL      A/G Ratio 1.8 g/dL      BUN/Creatinine Ratio --     Comment: Testing not performed        Anion Gap 11.0 mmol/L     Narrative:       GFR Normal >60  Chronic Kidney Disease <60  Kidney Failure <15      Troponin [[id number]]  (Normal) Collected:  08/08/20 0102    Specimen:  Blood Updated:  08/08/20 0153     Troponin T <0.010 ng/mL     Narrative:       Troponin T Reference Range:  <= 0.03 ng/mL-   Negative for AMI  >0.03 ng/mL-     Abnormal for myocardial necrosis.  Clinicians would have to utilize clinical acumen, EKG, Troponin and serial changes to determine if it is an Acute Myocardial Infarction or myocardial injury due to an underlying chronic condition.       Results may be falsely decreased if  patient taking Biotin.      Lipid Panel [[id number]]  (Abnormal) Collected:  08/08/20 0102    Specimen:  Blood Updated:  08/08/20 0153     Total Cholesterol 114 mg/dL      Triglycerides 144 mg/dL      HDL Cholesterol 35 mg/dL      LDL Cholesterol  50 mg/dL      VLDL Cholesterol 28.8 mg/dL      LDL/HDL Ratio 1.43    Narrative:       Cholesterol Reference Ranges  (U.S. Department of Health and Human Services ATP III Classifications)    Desirable          <200 mg/dL  Borderline High    200-239 mg/dL  High Risk          >240 mg/dL      Triglyceride Reference Ranges  (U.S. Department of Health and Human Services ATP III Classifications)    Normal           <150 mg/dL  Borderline High  150-199 mg/dL  High             200-499 mg/dL  Very High        >500 mg/dL    HDL Reference Ranges  (U.S. Department of Health and Human Services ATP III Classifcations)    Low     <40 mg/dl (major risk factor for CHD)  High    >60 mg/dl ('negative' risk factor for CHD)        LDL Reference Ranges  (U.S. Department of Health and Human Services ATP III Classifcations)    Optimal          <100 mg/dL  Near Optimal     100-129 mg/dL  Borderline High  130-159 mg/dL  High             160-189 mg/dL  Very High        >189 mg/dL    CBC & Differential [[id number]] Collected:  08/08/20 0102    Specimen:  Blood Updated:  08/08/20 0121    Narrative:       The following orders were created for panel order CBC & Differential.  Procedure                               Abnormality         Status                     ---------                               -----------         ------                     CBC Auto Differential[[id number]]        Normal              Final result                 Please view results for these tests on the individual orders.    CBC Auto Differential [[id number]]  (Normal) Collected:  08/08/20 0102    Specimen:  Blood Updated:  08/08/20 0121     WBC 7.70 10*3/mm3      RBC 4.83 10*6/mm3      Hemoglobin 14.5 g/dL      Hematocrit 42.4 %       MCV 87.8 fL      MCH 29.9 pg      MCHC 34.1 g/dL      RDW 14.8 %      RDW-SD 44.6 fl      MPV 7.4 fL      Platelets 193 10*3/mm3      Neutrophil % 51.7 %      Lymphocyte % 34.8 %      Monocyte % 10.1 %      Eosinophil % 2.5 %      Basophil % 0.9 %      Neutrophils, Absolute 4.00 10*3/mm3      Lymphocytes, Absolute 2.70 10*3/mm3      Monocytes, Absolute 0.80 10*3/mm3      Eosinophils, Absolute 0.20 10*3/mm3      Basophils, Absolute 0.10 10*3/mm3      nRBC 0.1 /100 WBC     Hemoglobin A1c [[id number]] Collected:  08/08/20 0102    Specimen:  Blood Updated:  08/08/20 0119    Troponin [[id number]]  (Normal) Collected:  08/07/20 2118    Specimen:  Blood Updated:  08/07/20 2151     Troponin T <0.010 ng/mL     Narrative:       Troponin T Reference Range:  <= 0.03 ng/mL-   Negative for AMI  >0.03 ng/mL-     Abnormal for myocardial necrosis.  Clinicians would have to utilize clinical acumen, EKG, Troponin and serial changes to determine if it is an Acute Myocardial Infarction or myocardial injury due to an underlying chronic condition.       Results may be falsely decreased if patient taking Biotin.      Troponin [[id number]]  (Normal) Collected:  08/07/20 1815    Specimen:  Blood Updated:  08/07/20 1908     Troponin T <0.010 ng/mL     Narrative:       Troponin T Reference Range:  <= 0.03 ng/mL-   Negative for AMI  >0.03 ng/mL-     Abnormal for myocardial necrosis.  Clinicians would have to utilize clinical acumen, EKG, Troponin and serial changes to determine if it is an Acute Myocardial Infarction or myocardial injury due to an underlying chronic condition.       Results may be falsely decreased if patient taking Biotin.      Extra Tubes [[id number]] Collected:  08/07/20 0958    Specimen:  Blood, Venous Line Updated:  08/07/20 1100    Narrative:       The following orders were created for panel order Extra Tubes.  Procedure                               Abnormality         Status                     ---------                                -----------         ------                     Light Blue Top[[id number]]                                   Final result               Gold Top - SST[[id number]]                                   Final result                 Please view results for these tests on the individual orders.    Light Blue Top [[id number]] Collected:  08/07/20 0958    Specimen:  Blood Updated:  08/07/20 1100     Extra Tube hold for add-on     Comment: Auto resulted       Gold Top - SST [[id number]] Collected:  08/07/20 0958    Specimen:  Blood Updated:  08/07/20 1100     Extra Tube Hold for add-ons.     Comment: Auto resulted.       BUN [[id number]]  (Normal) Collected:  08/07/20 0952    Specimen:  Blood Updated:  08/07/20 1044     BUN 19 mg/dL     Basic Metabolic Panel [[id number]]  (Abnormal) Collected:  08/07/20 0952    Specimen:  Blood Updated:  08/07/20 1041     Glucose 119 mg/dL      BUN --     Comment: Testing performed by alternate method        Creatinine 1.21 mg/dL      Sodium 138 mmol/L      Potassium 4.4 mmol/L      Chloride 100 mmol/L      CO2 29.0 mmol/L      Calcium 9.1 mg/dL      eGFR Non African Amer 61 mL/min/1.73      BUN/Creatinine Ratio --     Comment: Testing not performed        Anion Gap 9.0 mmol/L     Narrative:       GFR Normal >60  Chronic Kidney Disease <60  Kidney Failure <15      Troponin [[id number]]  (Normal) Collected:  08/07/20 0952    Specimen:  Blood Updated:  08/07/20 1041     Troponin T <0.010 ng/mL     Narrative:       Troponin T Reference Range:  <= 0.03 ng/mL-   Negative for AMI  >0.03 ng/mL-     Abnormal for myocardial necrosis.  Clinicians would have to utilize clinical acumen, EKG, Troponin and serial changes to determine if it is an Acute Myocardial Infarction or myocardial injury due to an underlying chronic condition.       Results may be falsely decreased if patient taking Biotin.      CBC & Differential [[id number]] Collected:  08/07/20 0952    Specimen:  Blood Updated:  08/07/20 1009     Narrative:       The following orders were created for panel order CBC & Differential.  Procedure                               Abnormality         Status                     ---------                               -----------         ------                     CBC Auto Differential[[id number]]        Normal              Final result                 Please view results for these tests on the individual orders.    CBC Auto Differential [[id number]]  (Normal) Collected:  08/07/20 0952    Specimen:  Blood Updated:  08/07/20 1009     WBC 7.80 10*3/mm3      RBC 4.88 10*6/mm3      Hemoglobin 14.4 g/dL      Hematocrit 43.1 %      MCV 88.2 fL      MCH 29.6 pg      MCHC 33.5 g/dL      RDW 14.6 %      RDW-SD 44.2 fl      MPV 7.2 fL      Platelets 220 10*3/mm3      Neutrophil % 61.3 %      Lymphocyte % 24.5 %      Monocyte % 10.9 %      Eosinophil % 2.3 %      Basophil % 1.0 %      Neutrophils, Absolute 4.80 10*3/mm3      Lymphocytes, Absolute 1.90 10*3/mm3      Monocytes, Absolute 0.80 10*3/mm3      Eosinophils, Absolute 0.20 10*3/mm3      Basophils, Absolute 0.10 10*3/mm3      nRBC 0.2 /100 WBC         No results found for: HGBA1C            No results found for: LIPASE  Lab Results   Component Value Date    CHOL 114 08/08/2020    CHLPL 124 07/20/2020    TRIG 144 08/08/2020    HDL 35 (L) 08/08/2020    LDL 50 08/08/2020       No results found for: INTRAOP, PREDX, FINALDX, COMDX    Microbiology Results (last 10 days)     ** No results found for the last 240 hours. **          ECG/EMG Results (most recent)     Procedure Component Value Units Date/Time    ECG 12 Lead [[id number]] Collected:  08/07/20 0928     Updated:  08/07/20 0929    Narrative:       HEART RATE= 77  bpm  RR Interval= 776  ms  LA Interval= 158  ms  P Horizontal Axis= 6  deg  P Front Axis= 41  deg  QRSD Interval= 94  ms  QT Interval= 380  ms  QRS Axis= 15  deg  T Wave Axis= 34  deg  - ABNORMAL ECG -  Sinus rhythm  Inferior infarct, old  Electronically Signed By:    Date and Time of Study: 2020-08-07 09:28:04    Transthoracic Echo Complete With Contrast if Necessary Per Protocol [[id number]] Collected:  08/07/20 1729     Updated:  08/07/20 1823     BSA 2.2 m^2      RVIDd 3.1 cm      IVSd 1.3 cm      LVIDd 4.8 cm      LVIDs 3.7 cm      LVPWd 1.0 cm      IVS/LVPW 1.3     FS 23.2 %      EDV(Teich) 105.6 ml      ESV(Teich) 56.6 ml      EF(Teich) 46.5 %      EDV(cubed) 108.1 ml      ESV(cubed) 48.9 ml      EF(cubed) 54.7 %      LV mass(C)d 208.7 grams      LV mass(C)dI 94.8 grams/m^2      SV(Teich) 49.1 ml      SI(Teich) 22.3 ml/m^2      SV(cubed) 59.1 ml      SI(cubed) 26.9 ml/m^2      Ao root diam 3.1 cm      Ao root area 7.4 cm^2      ACS 2.4 cm      asc Aorta Diam 3.3 cm      LVOT diam 2.3 cm      LVOT area 4.3 cm^2      RVOT diam 2.7 cm      RVOT area 5.7 cm^2      EDV(MOD-sp4) 90.7 ml      ESV(MOD-sp4) 33.3 ml      EF(MOD-sp4) 63.3 %      EDV(MOD-sp2) 44.4 ml      ESV(MOD-sp2) 19.2 ml      EF(MOD-sp2) 56.8 %      SV(MOD-sp4) 57.4 ml      SI(MOD-sp4) 26.1 ml/m^2      SV(MOD-sp2) 25.2 ml      SI(MOD-sp2) 11.5 ml/m^2      Ao root area (BSA corrected) 1.4     LV Jones Vol (BSA corrected) 41.2 ml/m^2      LV Sys Vol (BSA corrected) 15.1 ml/m^2      Aortic R-R 0.69 sec      Aortic HR 87.6 BPM      MV E max cruz 60.0 cm/sec      MV A max cruz 88.7 cm/sec      MV E/A 0.68     MV V2 max 100.1 cm/sec      MV max PG 4.0 mmHg      MV V2 mean 77.7 cm/sec      MV mean PG 2.5 mmHg      MV V2 VTI 26.0 cm      MVA(VTI) 4.5 cm^2      MV dec slope 159.7 cm/sec^2      MV dec time 0.38 sec      Ao pk cruz 151.9 cm/sec      Ao max PG 9.2 mmHg      Ao max PG (full) 3.5 mmHg      Ao V2 mean 123.6 cm/sec      Ao mean PG 6.4 mmHg      Ao mean PG (full) 2.4 mmHg      Ao V2 VTI 30.6 cm      MARIELA(I,A) 3.8 cm^2      MARIELA(I,D) 3.8 cm^2      MAREILA(V,A) 3.4 cm^2      MARIELA(V,D) 3.4 cm^2      LV V1 max PG 5.8 mmHg      LV V1 mean PG 4.1 mmHg      LV V1 max 120.0 cm/sec      LV V1 mean 99.3 cm/sec      LV V1 VTI 27.6 cm       CO(Ao) 19.8 l/min      CI(Ao) 9.0 l/min/m^2      SV(Ao) 225.9 ml      SI(Ao) 102.7 ml/m^2      CO(LVOT) 10.3 l/min      CI(LVOT) 4.7 l/min/m^2      SV(LVOT) 117.4 ml      SV(RVOT) 116.6 ml      SI(LVOT) 53.3 ml/m^2      PA V2 max 103.1 cm/sec      PA max PG 4.3 mmHg      PA max PG (full) 1.0 mmHg      PA V2 mean 84.0 cm/sec      PA mean PG 3.0 mmHg      PA mean PG (full) 0.84 mmHg      PA V2 VTI 20.4 cm      PVA(I,A) 5.7 cm^2       CV ECHO JOSE - PVA(I,D) 5.7 cm^2       CV ECHO JOSE - PVA(V,A) 5.0 cm^2       CV ECHO JOSE - PVA(V,D) 5.0 cm^2      PA acc time 0.1 sec      RV V1 max PG 3.3 mmHg      RV V1 mean PG 2.1 mmHg      RV V1 max 90.3 cm/sec      RV V1 mean 69.8 cm/sec      RV V1 VTI 20.5 cm      TR max yoav 176.7 cm/sec      RVSP(TR) 15.7 mmHg      RAP systole 3.0 mmHg      PA pr(Accel) 36.2 mmHg      Pulm Sys Yoav 50.8 cm/sec      Pulm Jones Yoav 37.4 cm/sec      Pulm S/D 1.4     Qp/Qs 0.99      CV ECHO JOSE - BZI_BMI 38.2 kilograms/m^2       CV ECHO JOSE - BSA(HAYCOCK) 2.3 m^2       CV ECHO JOSE - BZI_METRIC_WEIGHT 110.7 kg       CV ECHO JOSE - BZI_METRIC_HEIGHT 170.2 cm      EF(MOD-bp) 62.0 %      LA dimension(2D) 3.6 cm                     Xr Chest 1 View    Result Date: 8/7/2020  No acute cardiopulmonary abnormality.  Electronically Signed By-Dax Royal On:8/7/2020 10:58 AM This report was finalized on [id number] by  Dax Royal, .          Xrays, labs reviewed personally by physician.    Medication Review:   I have reviewed the patient's current medication list      Scheduled Meds    carvedilol 25 mg Oral BID AC   enalapril 5 mg Oral BID   enoxaparin 1 mg/kg Subcutaneous Q12H   nicotine 1 patch Transdermal Q24H   pantoprazole 40 mg Oral Q AM   rosuvastatin 20 mg Oral Daily   sodium chloride 10 mL Intravenous Q12H       Meds Infusions    nitroglycerin 10-50 mcg/min Last Rate: 10 mcg/min (08/08/20 0834)   Pharmacy to Dose enoxaparin (LOVENOX)     sodium chloride 100 mL/hr Last Rate:  100 mL/hr (08/08/20 0714)       Meds PRN  •  bisacodyl  •  cyclobenzaprine  •  nitroglycerin  •  Pharmacy to Dose enoxaparin (LOVENOX)  •  [COMPLETED] Insert peripheral IV **AND** sodium chloride  •  sodium chloride    I personally reviewed patient's x-ray films     I personally reviewed patient's EKG strips     Assessment/Plan   Assessment/Plan     Active Hospital Problems    Diagnosis  POA   • Unstable angina (CMS/HCC) [I20.0]  Yes      Resolved Hospital Problems   No resolved problems to display.       MEDICAL DECISION MAKING COMPLEXITY BY PROBLEM:     # ACS     Past cardiac history: multiple PCIs in [place] in the past     NSTEMI and MIGUEL to RCA 6/18/15 and LCX      06/24/2015//cath 7/8/2015                                         Patent stents in RCA and LCX,     Borderline disease in ramus intermedius and moderate disease in     proximal LAD  Ruled out for MI by serial troponins  Cardiac evaluation  Echocardiogram  Nitroglycerin as needed for symptom control  Lovenox  Telemetry  Cardiac diet      VTE Prophylaxis -   Mechanical Order History:     None      Pharmalogical Order History:     Ordered     Dose Route Frequency Stop    08/07/20 1744  enoxaparin (LOVENOX) syringe 110 mg      1 mg/kg SC Every 12 Hours --    08/07/20 1702  enoxaparin (LOVENOX) syringe 40 mg  Status:  Discontinued      40 mg SC Every 24 Hours Scheduled 08/07/20 1703    08/07/20 1704  Pharmacy to Dose enoxaparin (LOVENOX)      -- XX Continuous PRN --    08/07/20 1055  enoxaparin (LOVENOX) syringe 110 mg      1 mg/kg SC Once 08/07/20 1144            Code Status -   Code Status and Medical Interventions:   Ordered at: 08/07/20 1702     Level Of Support Discussed With:    Patient     Code Status:    CPR     Medical Interventions (Level of Support Prior to Arrest):    Full       This patient has been examined wearing appropriate Personal Protective Equipment and discussed with hospital infection control department. 08/08/20        Discharge  Planning          Destination      Coordination has not been started for this encounter.      Durable Medical Equipment      Coordination has not been started for this encounter.      Dialysis/Infusion      Coordination has not been started for this encounter.      Home Medical Care      Coordination has not been started for this encounter.      Therapy      Coordination has not been started for this encounter.      Community Resources      Coordination has not been started for this encounter.            Electronically signed by Roberta Vargas MD, 08/08/20, 09:04.  [facility] Hospitalist Team

## 2020-08-08 NOTE — TREATMENT PLAN
Stress test with no evidence of any inducible ischemia  Test results and further treatment options discussed the patient  No evidence of any acute coronary syndrome  No further chest pain  No significant ischemia on the stress testing  Further evaluation and treatment options discussed with the patient  He likes to try medical therapy at this time  Start patient on Imdur 30 mg p.o. once a day  Advised patient to consider further evaluation work-up there is any recurrence of chest discomfort  Follow-up with cardiology in 1 week in the office

## 2020-08-08 NOTE — PLAN OF CARE
Problem: Patient Care Overview  Goal: Plan of Care Review  Outcome: Ongoing (interventions implemented as appropriate)  Flowsheets (Taken 8/8/2020 4620)  Progress: improving  Plan of Care Reviewed With: patient  Outcome Summary: Pt awaiting stress test results. Dr. Lira with cardiology consulted. Nitro gtt discontinued. Patient has been chest pain free throughout the day. Vitals WNL. Will continue to monitor.

## 2020-08-08 NOTE — CONSULTS
Cardiology Consult Note      REQUESTING PHYSICIAN    Roberta Vargas MD    PATIENT IDENTIFICATION  Name: John Wilburn  Age: 65 y.o.  Sex: male  :  1955  MRN: [id number]         Cardiology assessment and plan    Chest pain  Recurrent chest discomfort  Prior known coronary artery disease with prior PCI and stenting  History of known myocardial infarction  Hypertension  Hyperlipidemia  Obesity  Obstructive sleep apnea  Gastroesophageal reflux disease      Patient ruled out for acute myocardial infarction by serial troponins  EKG shows prior inferior wall MI with no acute ST-T changes  Patient is currently pain-free  Echocardiogram with normal LV systolic function estimated LV ejection fraction of 60%    Further treatment options risk benefits discussed with the patient  He prefers to have further evaluation with a stress test further stratification  Risk benefits and alternatives discussed the patient  Continue current medical therapy  Schedule for stress test  Further recommendations based on patient course            REASON FOR CONSULTATION:  65-year-old  male with known history of coronary occlusive disease of the RCA/LCx status post non-STEMI and prior PCI with stent placement x4 total in [place] where he lived previously.  Last procedure .  Additional history of gastroesophageal reflux disease, hypertension, dyslipidemia, chronic back pain, obstructive sleep apnea with CPAP use, intolerance to Lipitor, current tobacco use, family history heart disease in mother/father.      CC:  Chest pain    HISTORY OF PRESENT ILLNESS:   Patient presented to the emergency department 2020 with complaint of chest pain.  He reports his discomfort began yesterday morning, was sharp in nature with no radiation.  He reports some lightheadedness as well.  No other associated symptoms.  He does have significant issues with GERD and thought initially this was related to reflux.  Pain 7/10 in  "intensity.  He took 2 sprays of sublingual nitroglycerin and the discomfort subsided.  He was brought to the emergency department via private vehicle by his wife.  By the time he arrived to the ER he states the discomfort was starting to return.  He took 1 additional spray of nitroglycerin that did improve the intensity of the discomfort.  Patient also reports he has had some more mild episodes occurring over the past 2 weeks with radiation through to his back.  No associated symptoms with these episodes.  In the emergency department, work-up was initiated including serial cardiac enzymes which have been negative x4.  Additional labs essentially normal.  EKG shows sinus rhythm with old inferior wall MI.  He was started on IV nitroglycerin.  He reports no recurrence of the discomfort.      REVIEW OF SYSTEMS:  Pertinent items are noted in HPI, all other systems reviewed and negative    OBJECTIVE   Sitting up in bed watching television appears in no acute distress    ASSESSMENT/PLAN    Coronary angioplasty status    Coronary atherosclerosis    GERD (gastroesophageal reflux disease)    HTN (hypertension), benign    Hyperlipidemia    Obstructive sleep apnea    Overweight    Unstable angina (CMS/HCC)    Chest pain in adult    Plan  Patient is ruled out for acute coronary syndrome with negative serial cardiac enzymes x4.  No acute ST segment deviation noted on EKG.  Plan at this time is to keep patient n.p.o. and schedule nuclear Myoview to further stratify  Further recommendations following results of SPECT images        Vital Signs  Visit Vitals  /72   Pulse 68   Temp 98.3 °F (36.8 °C) (Oral)   Resp 14   Ht 170.2 cm (67\")   Wt 111 kg (244 lb 11.4 oz)   SpO2 96%   BMI 38.33 kg/m²     Oxygen Therapy  SpO2: 96 %  Pulse Oximetry Type: Intermittent  Device (Oxygen Therapy): room air  Device (Oxygen Therapy): room air  Flowsheet Rows      First Filed Value   Admission Height  170.2 cm (67\") Documented at 08/07/2020 0926 "   Admission Weight  110 kg (242 lb) Documented at 08/07/2020 0926        Intake & Output (last 3 days)       08/05 0701 - 08/06 0700 08/06 0701 - 08/07 0700 08/07 0701 - 08/08 0700 08/08 0701 - 08/09 0700    I.V. (mL/kg)   999 (9)     Total Intake(mL/kg)   999 (9)     Urine (mL/kg/hr)   1680 875 (2)    Total Output   1680 875    Net   -681 [phone]                Lines, Drains & Airways    Active LDAs     Name:   Placement date:   Placement time:   Site:   Days:    Peripheral IV 08/07/20 0952 Right Antecubital   08/07/20 0952    Antecubital   1                MEDICAL HISTORY    Past Medical History:   Diagnosis Date   • Acute bronchitis    • Acute myocardial infarction, subsequent episode of care (CMS/[facility])    • Acute non-recurrent frontal sinusitis    • Acute right-sided low back pain with right-sided sciatica     Impression: persistent, long standing back pain, worse, with rle radiculopathy, h/o vertebral fx, refractory to pt xray with severe degenerative changes l5/ s1 check mri, referrall to dr amarilys arias 20 minutes bid nsaids Rehabilitation exercises discussed. continue pt   • Allergic rhinitis    • Allergy to poison ivy     Impression: Due to the wide spread rash he was started on Prednisone per pt request. He was advised to RTC if his symptoms did not improve.   • Annual visit for general adult medical examination with abnormal findings     Impression: doing well, vaccines current Age specific anticipatory guidance and warning signs discussed. Diet, exercise, and lifestyle modification discussed. Safety, seatbelts, and routine screening examinations discussed. Discussed self-examinations.   • Bronchitis    • Cellulitis of buttock     Impression: possibly 2nd insect bite Topical care discussed. Discussed possible necessity of I and D. Call / return if fever, worsening symptoms.   • Chest pain     Impression: atypical, improved / resolved currently possibly 2nd nausea /gi upset /viral uri serial ekg's, troponin's  normal followed by cardiology in [place], he reports negative treadmill cardiolyte 12/11 d/c to home, Follow up 2 to 3 days. Follow up with cardiology planned consider repeat stress test if chest pain on exertion, worsening symptoms.   • Colon cancer screening     Impression: has a colonoscopy, will get records   • Conjunctivitis     Impression: viral Topical care discussed. wash hands frequently   • Coronary atherosclerosis     Impression: h/o stent 2003, 2016 followed by meng, had recent neg stress test, will get records h/o normal carptid doppler per her report, will get records continue coreg, statin, effient continue risk factor reduction recommend cardiology Follow up he states h3 will consider   • GERD (gastroesophageal reflux disease)    • Hematoma     Impression: hand, much improved xray neg for fracture per ed Signs and symptoms of infecton discussed. Call / return if worsening symptoms.   • Hepatitis B     Impression: h/o, recheck levels   • Herpes zoster     Impression: topical care discussed cover with antibiotics Follow up for zostavax   • History of tobacco use    • HTN (hypertension), benign     Impression: good control Discussed low sodium diet, lifestyle modification. Follow up recheck   • Hyperlipidemia     Impression: followed by Rox[facility] improved tolerating crestor rx, ldl to 71, + aggressive ldl target considering cad recheck   • Lumbar disc disease     Impression: followed by [facility] undergoing epidual injxn   • Malaise and fatigue     Impression: check vitamin levels h/o low t do not recommend replacement consider age enedelia under good cobtrol consider wellbutrin Follow up recheck Call / return if worsening symptoms.   • Myalgia     Impression: possibly 2nd crestor, hold x 1 to 2 mos risk/benefit RX discussed, considering restart possibly at lower dose   • Nevus     Impression: path shows benign lentigo Discussed skin care, use of sun block and protective clothing.   • Obesity    •  Obesity, morbid, BMI 40.0-49.9 (CMS/[facility])     DOCUMENTATION OF FOLLOW-UP PLAN FOR PATIENT WITH BMI ABOVE NORMAL ()   • Overweight (BMI 25.0-29.9)     Impression: Discussed diet, exercise, lifestyle modification. Follow up [id number]   • Pruritus    • Right hip pain     Impression: check xray   • Screening for depression     Negative Depression Screening (4 or less) ()   • Screening PSA (prostate specific antigen)     Impression: psa normal / damián normal   • Sinusitis, bacterial     Impression: He was prescribed Cipro and Tessalon perles. Increase fluids. Tylenol/motrin for pain or fever. Medication and medication adverse effects discussed. Follow-up 5-7 days for reevaluation if not improved or sooner if needed.   • Skin lesion     Impression: rec resection / derm ref sched   • Sleep apnea, obstructive     Story: on CPAP   • URI (upper respiratory infection)     Impression: Increase fluid intake. Mucinex Call / return if fever, respiratory difficulty, worsening symptoms.   • Vertiginous syndrome and labyrinthine disorder     Impression: Differential diagnosis discussed. Follow-up in 2 weeks if not better. Follow-up sooner for worsening symptoms or for any concerns. Zyrtec as directed.   • Vertigo     Impression: likely secondary to sinusitis with eustachian tube dysfunction, rx in progress ddx includes vestibular neuritis, cover with prednisone Follow up recheck Consider imaging if persistent symptoms.   • Vitamin D deficiency         SURGICAL HISTORY    Past Surgical History:   Procedure Laterality Date   • CATARACT EXTRACTION, BILATERAL Bilateral    • CORONARY STENT PLACEMENT  2003   • EYE SURGERY  10/16/2019    repair detached retina   • NASAL SEPTUM SURGERY          FAMILY HISTORY    Family History   Problem Relation Age of Onset   • Hypertension Mother    • Diabetes Mother    • Heart disease Mother    • Heart attack Father    • Rheumatic fever Father    • No Known Problems Sister    • No Known Problems  "Brother    • No Known Problems Maternal Aunt    • No Known Problems Maternal Uncle    • No Known Problems Paternal Aunt    • No Known Problems Paternal Uncle    • No Known Problems Maternal Grandmother    • No Known Problems Maternal Grandfather    • No Known Problems Paternal Grandmother    • No Known Problems Paternal Grandfather    • No Known Problems Other    • Anemia Neg Hx    • Arrhythmia Neg Hx    • Asthma Neg Hx    • Clotting disorder Neg Hx    • Fainting Neg Hx    • Heart failure Neg Hx    • Hyperlipidemia Neg Hx        SOCIAL HISTORY    Social History     Tobacco Use   • Smoking status: Former Smoker     Packs/day: 1.00     Years: 10.00     Pack years: 10.00     Types: Cigarettes     Last attempt to quit: 1990     Years since quittin.6   • Smokeless tobacco: Never Used   Substance Use Topics   • Alcohol use: Yes     Comment: Occasional        ALLERGIES    Allergies   Allergen Reactions   • Vicodin [Hydrocodone-Acetaminophen] Hives   • Atorvastatin Hives   • Propoxyphene Hives              /72   Pulse 68   Temp 98.3 °F (36.8 °C) (Oral)   Resp 14   Ht 170.2 cm (67\")   Wt 111 kg (244 lb 11.4 oz)   SpO2 96%   BMI 38.33 kg/m²   Intake/Output last 3 shifts:  I/O last 3 completed shifts:  In: 999 [I.V.:999]  Out: 1680 [Urine:1680]  Intake/Output this shift:  I/O this shift:  In: -   Out: 875 [Urine:875]    PHYSICAL EXAM:    General: Well-developed, obese 65-year-old  male with BMI 38.33 who was alert, cooperative, no distress, appears stated age  Head:  Normocephalic, atraumatic, mucous membranes moist  Eyes:  Conjunctiva/corneas clear, EOM's intact     Neck:  Supple,  no adenopathy; no JVD or bruit  Lungs: Clear to auscultation bilaterally, no wheezes rhonchi rales are noted  Chest wall: No tenderness  Heart::  Regular rate and rhythm, S1 and S2 normal, no murmur, rub or gallop  Abdomen: Soft, non-tender, nondistended bowel sounds active  Extremities: No cyanosis, clubbing, or " edema groin soft no hematoma.  Pulses: 2+ and symmetric all extremities  Skin:  No rashes or lesions  Neuro/psych: A&O x3. CN II through XII are grossly intact with appropriate affect      Scheduled Meds:        carvedilol 25 mg Oral BID AC   enalapril 5 mg Oral BID   enoxaparin 1 mg/kg Subcutaneous Q12H   nicotine 1 patch Transdermal Q24H   pantoprazole 40 mg Oral Q AM   rosuvastatin 20 mg Oral Daily   sodium chloride 10 mL Intravenous Q12H       Continuous Infusions:      nitroglycerin 10-50 mcg/min Last Rate: Stopped (08/08/20 1051)   Pharmacy to Dose enoxaparin (LOVENOX)     sodium chloride 100 mL/hr Last Rate: 100 mL/hr (08/08/20 0714)       PRN Meds:    •  bisacodyl  •  cyclobenzaprine  •  nitroglycerin  •  Pharmacy to Dose enoxaparin (LOVENOX)  •  [COMPLETED] Insert peripheral IV **AND** sodium chloride  •  sodium chloride        Results Review:     I reviewed the patient's new clinical results.    CBC    Results from last 7 days   Lab Units 08/08/20  0102 08/07/20  0952   WBC 10*3/mm3 7.70 7.80   HEMOGLOBIN g/dL 14.5 14.4   PLATELETS 10*3/mm3 193 220     Cr Clearance Estimated Creatinine Clearance: 97.8 mL/min (by C-G formula based on SCr of 0.92 mg/dL).  Coag     HbA1C No results found for: HGBA1C  Blood Glucose No results found for: POCGLU  Infection     CMP   Results from last 7 days   Lab Units 08/08/20  0102 08/07/20  0952   SODIUM mmol/L 140 138   POTASSIUM mmol/L 4.0 4.4   CHLORIDE mmol/L 104 100   CO2 mmol/L 25.0 29.0   BUN  14 19   CREATININE mg/dL 0.92 1.21   GLUCOSE mg/dL 111* 119*   ALBUMIN g/dL 4.00  --    BILIRUBIN mg/dL 0.5  --    ALK PHOS U/L 52  --    AST (SGOT) U/L 23  --    ALT (SGPT) U/L 31  --      ABG      UA      JENNY  No results found for: POCMETH, POCAMPHET, POCBARBITUR, POCBENZO, POCCOCAINE, POCOPIATES, POCOXYCODO, POCPHENCYC, POCPROPOXY, POCTHC, POCTRICYC  Lysis Labs   Results from last 7 days   Lab Units 08/08/20  0102 08/07/20  0952   HEMOGLOBIN g/dL 14.5 14.4   PLATELETS 10*3/mm3  193 220   CREATININE mg/dL 0.92 1.21     Radiology(recent) Xr Chest 1 View    Result Date: 8/7/2020  No acute cardiopulmonary abnormality.  Electronically Signed By-Dax Royal On:8/7/2020 10:58 AM This report was finalized on [id number] by  Dax Royal, .        Results from last 7 days   Lab Units 08/08/20  0637   TROPONIN T ng/mL <0.010       Xrays, labs reviewed personally by physician.    ECG/EMG Results (most recent)     Procedure Component Value Units Date/Time    ECG 12 Lead [[id number]] Collected:  08/07/20 0928     Updated:  08/07/20 0929    Narrative:       HEART RATE= 77  bpm  RR Interval= 776  ms  ID Interval= 158  ms  P Horizontal Axis= 6  deg  P Front Axis= 41  deg  QRSD Interval= 94  ms  QT Interval= 380  ms  QRS Axis= 15  deg  T Wave Axis= 34  deg  - ABNORMAL ECG -  Sinus rhythm  Inferior infarct, old  Electronically Signed By:   Date and Time of Study: 2020-08-07 09:28:04    Transthoracic Echo Complete With Contrast if Necessary Per Protocol [[id number]] Collected:  08/07/20 1729     Updated:  08/07/20 1823     BSA 2.2 m^2      RVIDd 3.1 cm      IVSd 1.3 cm      LVIDd 4.8 cm      LVIDs 3.7 cm      LVPWd 1.0 cm      IVS/LVPW 1.3     FS 23.2 %      EDV(Teich) 105.6 ml      ESV(Teich) 56.6 ml      EF(Teich) 46.5 %      EDV(cubed) 108.1 ml      ESV(cubed) 48.9 ml      EF(cubed) 54.7 %      LV mass(C)d 208.7 grams      LV mass(C)dI 94.8 grams/m^2      SV(Teich) 49.1 ml      SI(Teich) 22.3 ml/m^2      SV(cubed) 59.1 ml      SI(cubed) 26.9 ml/m^2      Ao root diam 3.1 cm      Ao root area 7.4 cm^2      ACS 2.4 cm      asc Aorta Diam 3.3 cm      LVOT diam 2.3 cm      LVOT area 4.3 cm^2      RVOT diam 2.7 cm      RVOT area 5.7 cm^2      EDV(MOD-sp4) 90.7 ml      ESV(MOD-sp4) 33.3 ml      EF(MOD-sp4) 63.3 %      EDV(MOD-sp2) 44.4 ml      ESV(MOD-sp2) 19.2 ml      EF(MOD-sp2) 56.8 %      SV(MOD-sp4) 57.4 ml      SI(MOD-sp4) 26.1 ml/m^2      SV(MOD-sp2) 25.2 ml      SI(MOD-sp2) 11.5 ml/m^2      Ao root  area (BSA corrected) 1.4     LV Jones Vol (BSA corrected) 41.2 ml/m^2      LV Sys Vol (BSA corrected) 15.1 ml/m^2      Aortic R-R 0.69 sec      Aortic HR 87.6 BPM      MV E max yoav 60.0 cm/sec      MV A max yoav 88.7 cm/sec      MV E/A 0.68     MV V2 max 100.1 cm/sec      MV max PG 4.0 mmHg      MV V2 mean 77.7 cm/sec      MV mean PG 2.5 mmHg      MV V2 VTI 26.0 cm      MVA(VTI) 4.5 cm^2      MV dec slope 159.7 cm/sec^2      MV dec time 0.38 sec      Ao pk yoav 151.9 cm/sec      Ao max PG 9.2 mmHg      Ao max PG (full) 3.5 mmHg      Ao V2 mean 123.6 cm/sec      Ao mean PG 6.4 mmHg      Ao mean PG (full) 2.4 mmHg      Ao V2 VTI 30.6 cm      MARIELA(I,A) 3.8 cm^2      MARIELA(I,D) 3.8 cm^2      MARIELA(V,A) 3.4 cm^2      MARIELA(V,D) 3.4 cm^2      LV V1 max PG 5.8 mmHg      LV V1 mean PG 4.1 mmHg      LV V1 max 120.0 cm/sec      LV V1 mean 99.3 cm/sec      LV V1 VTI 27.6 cm      CO(Ao) 19.8 l/min      CI(Ao) 9.0 l/min/m^2      SV(Ao) 225.9 ml      SI(Ao) 102.7 ml/m^2      CO(LVOT) 10.3 l/min      CI(LVOT) 4.7 l/min/m^2      SV(LVOT) 117.4 ml      SV(RVOT) 116.6 ml      SI(LVOT) 53.3 ml/m^2      PA V2 max 103.1 cm/sec      PA max PG 4.3 mmHg      PA max PG (full) 1.0 mmHg      PA V2 mean 84.0 cm/sec      PA mean PG 3.0 mmHg      PA mean PG (full) 0.84 mmHg      PA V2 VTI 20.4 cm      PVA(I,A) 5.7 cm^2      BH CV ECHO JOSE - PVA(I,D) 5.7 cm^2      BH CV ECHO JOSE - PVA(V,A) 5.0 cm^2       CV ECHO JOSE - PVA(V,D) 5.0 cm^2      PA acc time 0.1 sec      RV V1 max PG 3.3 mmHg      RV V1 mean PG 2.1 mmHg      RV V1 max 90.3 cm/sec      RV V1 mean 69.8 cm/sec      RV V1 VTI 20.5 cm      TR max yoav 176.7 cm/sec      RVSP(TR) 15.7 mmHg      RAP systole 3.0 mmHg      PA pr(Accel) 36.2 mmHg      Pulm Sys Yoav 50.8 cm/sec      Pulm Jones Yoav 37.4 cm/sec      Pulm S/D 1.4     Qp/Qs 0.99      CV ECHO JOSE - BZI_BMI 38.2 kilograms/m^2       CV ECHO JOSE - BSA(HAYCOCK) 2.3 m^2       CV ECHO JOSE - BZI_METRIC_WEIGHT 110.7 kg       CV ECHO JOSE -  BZI_METRIC_HEIGHT 170.2 cm      EF(MOD-bp) 62.0 %      LA dimension(2D) 3.6 cm             Medication Review:   I have reviewed the patient's current medication list  Scheduled Meds:    carvedilol 25 mg Oral BID AC   enalapril 5 mg Oral BID   enoxaparin 1 mg/kg Subcutaneous Q12H   nicotine 1 patch Transdermal Q24H   pantoprazole 40 mg Oral Q AM   rosuvastatin 20 mg Oral Daily   sodium chloride 10 mL Intravenous Q12H     Continuous Infusions:    nitroglycerin 10-50 mcg/min Last Rate: Stopped (08/08/20 1051)   Pharmacy to Dose enoxaparin (LOVENOX)     sodium chloride 100 mL/hr Last Rate: 100 mL/hr (08/08/20 0714)     PRN Meds:.•  bisacodyl  •  cyclobenzaprine  •  nitroglycerin  •  Pharmacy to Dose enoxaparin (LOVENOX)  •  [COMPLETED] Insert peripheral IV **AND** sodium chloride  •  sodium chloride    Imaging:  Imaging Results (Last 72 Hours)     Procedure Component Value Units Date/Time    XR Chest 1 View [[id number]] Collected:  08/07/20 1058     Updated:  08/07/20 1101    Narrative:       DATE OF EXAM:  8/7/2020 10:11 AM     PROCEDURE:  XR CHEST 1 VW-     INDICATIONS:  Chest pain     COMPARISON:  12/19/2018     TECHNIQUE:   Single radiographic view of the chest was obtained.     FINDINGS:  Lungs are normally expanded. Heart size is normal. No pneumothorax or  pleural effusion or focal pulmonary parenchymal opacity. Pulmonary  vessels are distinct. Bones and soft tissues are normal.       Impression:       No acute cardiopulmonary abnormality.     Electronically Signed By-Dax Royal On:8/7/2020 10:58 AM  This report was finalized on [id number] by  Dax Royal, .

## 2020-08-08 NOTE — PLAN OF CARE
Problem: Patient Care Overview  Goal: Plan of Care Review  Outcome: Ongoing (interventions implemented as appropriate)  Flowsheets (Taken 8/8/2020 0316)  Progress: improving  Plan of Care Reviewed With: patient  Note:   Patient is on Tridil at 15 mcg/min and Normal saline at 100 ml/ hr Patient has been resting well with no complaints will continue to monitor.

## 2020-08-08 NOTE — OUTREACH NOTE
Prep Survey      Responses   [facility]   Is LACE score < 7 ?  Yes   Eligibility  [facility]   Hospital  [facility]   Date of Admission  08/07/20   Date of Discharge  08/08/20   Discharge Disposition  Home or Self Care   Discharge diagnosis  Unstable angina    COVID-19 Test Status  Not tested   Does the patient have one of the following disease processes/diagnoses(primary or secondary)?  Other   Does the patient have Home health ordered?  No   Is there a DME ordered?  No   Prep survey completed?  Yes          Ernestine Fragoso RN

## 2020-08-10 ENCOUNTER — TRANSITIONAL CARE MANAGEMENT TELEPHONE ENCOUNTER (OUTPATIENT)
Dept: CALL CENTER | Facility: HOSPITAL | Age: 65
End: 2020-08-10

## 2020-08-10 LAB — HBA1C MFR BLD: 5.5 % (ref 3.5–5.6)

## 2020-08-10 NOTE — OUTREACH NOTE
Call Center TCM Note      Responses   [facility] patient discharged from?  [facility]   COVID-19 Test Status  Not tested   Does the patient have one of the following disease processes/diagnoses(primary or secondary)?  Other   TCM attempt successful?  No   Revoked Reason  Phone Issues [Phone is for Franca Abebe]          Nayeli Green LPN    8/10/2020, 17:07

## 2020-08-10 NOTE — OUTREACH NOTE
Call Center TCM Note      Responses   [facility] [place] patient discharged from?  [facility]   COVID-19 Test Status  Not tested   Does the patient have one of the following disease processes/diagnoses(primary or secondary)?  Other   TCM attempt successful?  No   Unsuccessful attempts  Attempt 1          Nayeli Green LPN    8/10/2020, 17:04

## 2020-08-27 DIAGNOSIS — H92.02 LEFT EAR PAIN: ICD-10-CM

## 2020-08-27 RX ORDER — FLUTICASONE PROPIONATE 50 MCG
2 SPRAY, SUSPENSION (ML) NASAL DAILY PRN
Qty: 16 G | Refills: 0 | Status: SHIPPED | OUTPATIENT
Start: 2020-08-27

## 2020-10-12 ENCOUNTER — OFFICE VISIT (OUTPATIENT)
Dept: FAMILY MEDICINE CLINIC | Facility: CLINIC | Age: 65
End: 2020-10-12

## 2020-10-12 VITALS
TEMPERATURE: 98.6 F | OXYGEN SATURATION: 95 % | WEIGHT: 248.8 LBS | HEART RATE: 95 BPM | DIASTOLIC BLOOD PRESSURE: 72 MMHG | BODY MASS INDEX: 39.05 KG/M2 | HEIGHT: 67 IN | SYSTOLIC BLOOD PRESSURE: 122 MMHG

## 2020-10-12 DIAGNOSIS — Z23 NEED FOR INFLUENZA VACCINATION: ICD-10-CM

## 2020-10-12 DIAGNOSIS — M25.511 CHRONIC RIGHT SHOULDER PAIN: Primary | ICD-10-CM

## 2020-10-12 DIAGNOSIS — G89.29 CHRONIC RIGHT SHOULDER PAIN: Primary | ICD-10-CM

## 2020-10-12 PROCEDURE — 90686 IIV4 VACC NO PRSV 0.5 ML IM: CPT | Performed by: FAMILY MEDICINE

## 2020-10-12 PROCEDURE — 90471 IMMUNIZATION ADMIN: CPT | Performed by: FAMILY MEDICINE

## 2020-10-12 PROCEDURE — 96372 THER/PROPH/DIAG INJ SC/IM: CPT | Performed by: FAMILY MEDICINE

## 2020-10-12 PROCEDURE — 99213 OFFICE O/P EST LOW 20 MIN: CPT | Performed by: FAMILY MEDICINE

## 2020-10-12 RX ORDER — DEXAMETHASONE SODIUM PHOSPHATE 10 MG/ML
10 INJECTION INTRAMUSCULAR; INTRAVENOUS ONCE
Status: COMPLETED | OUTPATIENT
Start: 2020-10-12 | End: 2020-10-12

## 2020-10-12 RX ORDER — METHYLPREDNISOLONE 4 MG/1
TABLET ORAL
Qty: 21 TABLET | Refills: 0 | Status: SHIPPED | OUTPATIENT
Start: 2020-10-12 | End: 2020-11-11

## 2020-10-12 RX ORDER — METHYLPREDNISOLONE ACETATE 80 MG/ML
80 INJECTION, SUSPENSION INTRA-ARTICULAR; INTRALESIONAL; INTRAMUSCULAR; SOFT TISSUE ONCE
Status: COMPLETED | OUTPATIENT
Start: 2020-10-12 | End: 2020-10-12

## 2020-10-12 RX ADMIN — METHYLPREDNISOLONE ACETATE 80 MG: 80 INJECTION, SUSPENSION INTRA-ARTICULAR; INTRALESIONAL; INTRAMUSCULAR; SOFT TISSUE at 16:47

## 2020-10-12 RX ADMIN — DEXAMETHASONE SODIUM PHOSPHATE 10 MG: 10 INJECTION INTRAMUSCULAR; INTRAVENOUS at 16:46

## 2020-10-12 NOTE — PATIENT INSTRUCTIONS
Shoulder Range of Motion Exercises    Shoulder range of motion (ROM) exercises are done to keep the shoulder moving freely or to increase movement. They are often recommended for people who have shoulder pain or stiffness or who are recovering from a shoulder surgery.    When you are able, do this exercise 1-2 times per day for 30-60 seconds in each direction, or as directed by your health care provider.    Pendulum exercise  To do this exercise while sittin. Sit in a chair or at the edge of your bed with your feet flat on the floor.  2. Let your affected arm hang down in front of you over the edge of the bed or chair.  3. Relax your shoulder, arm, and hand.  4. Rock your body so your arm gently swings in small circles. You can also use your unaffected arm to start the motion.  5. Repeat changing the direction of the circles, swinging your arm left and right, and swinging your arm forward and back.  To do this exercise while standin. Stand next to a sturdy chair or table, and hold on to it with your hand on your unaffected side.  2. Bend forward at the waist.  3. Bend your knees slightly.  4. Relax your shoulder, arm, and hand.  5. While keeping your shoulder relaxed, use body motion to swing your arm in small circles.  6. Repeat changing the direction of the circles, swinging your arm left and right, and swinging your arm forward and back.  7. Between exercises, stand up tall and take a short break to relax your lower back.    Wall climbs  1. Stand with your affected arm extended out to the side with your hand resting on a door frame.  2. Slide your hand slowly up the door frame.  3. To increase the stretch, step through the door frame. Keep your body upright and do not lean.    Contact a health care provider if you have:  · New or increasing pain.  · New numbness, tingling, weakness, or discoloration in your arm or hand.    This information is not intended to replace advice given to you by your health  care provider. Make sure you discuss any questions you have with your health care provider.  Document Released: 09/15/2004 Document Revised: 01/30/2019 Document Reviewed: 01/30/2019  Elsevier Patient Education © 2020 [employer]

## 2020-10-12 NOTE — PROGRESS NOTES
Chief Complaint   Patient presents with   • Shoulder Pain       Subjective   John Wilburn is a 65 y.o. male.     Patient is here today for left shoulder pain that started this past spring.  Patient stats he did come in and see his PCP and was given a shot that relived his pain, but pain has come back.     Patient is right-hand dominant.    Shoulder Injury   The incident occurred at home. The left shoulder is affected. There was no injury mechanism. The quality of the pain is described as stabbing. The pain radiates to the left arm. The pain is at a severity of 7/10. The pain is moderate. Pertinent negatives include no chest pain, muscle weakness, numbness or tingling. The symptoms are aggravated by movement and overhead lifting. He has tried nothing for the symptoms. The treatment provided no relief.          I have reviewed and updated his medications, medical history and problem list during today's office visit.       Past Medical History :  Active Ambulatory Problems     Diagnosis Date Noted   • Acute myocardial infarction, subsequent episode of care (CMS/[facility]) 12/06/2019   • Acute right-sided low back pain with right-sided sciatica 12/06/2019   • Allergic rhinitis 12/06/2019   • Coronary angioplasty status 12/06/2019   • Coronary atherosclerosis 01/01/1960   • Edema 03/02/2017   • Encounter for annual general medical examination with abnormal findings in adult 12/06/2019   • Colon cancer screening 12/06/2019   • Screening PSA (prostate specific antigen) 12/06/2019   • GERD (gastroesophageal reflux disease) 12/06/2019   • Hepatitis B 12/06/2019   • Herpes zoster 12/06/2019   • History of tobacco use 12/06/2019   • HTN (hypertension), benign 12/06/2019   • Hyperlipidemia 12/06/2019   • Lumbar disc disease 12/06/2019   • Hematoma 12/06/2019   • Vertiginous syndrome and labyrinthine disorder 12/06/2019   • Myalgia 12/06/2019   • Nevus 12/06/2019   • Obstructive sleep apnea 12/06/2019   • Overweight 12/06/2019   •  Right hip pain 12/06/2019   • Vitamin D deficiency 12/06/2019   • Tendinitis of right rotator cuff 06/01/2020   • Left ear pain 07/22/2020   • Acute bacterial sinusitis 07/25/2020   • Unstable angina (CMS/[facility]) 08/07/2020   • Chest pain in adult 08/08/2020     Resolved Ambulatory Problems     Diagnosis Date Noted   • No Resolved Ambulatory Problems     Past Medical History:   Diagnosis Date   • Acute bronchitis    • Acute non-recurrent frontal sinusitis    • Allergy to poison ivy    • Annual visit for general adult medical examination with abnormal findings    • Bronchitis    • Cellulitis of buttock    • Chest pain    • Conjunctivitis    • Malaise and fatigue    • Obesity    • Obesity, morbid, BMI 40.0-49.9 (CMS/[facility])    • Overweight (BMI 25.0-29.9)    • Pruritus    • Screening for depression    • Sinusitis, bacterial    • Skin lesion    • Sleep apnea, obstructive    • URI (upper respiratory infection)    • Vertigo        Medication List:    Current Outpatient Medications:   •  aspirin 325 MG tablet, Take 1 tablet by mouth Daily., Disp: , Rfl:   •  carvedilol (COREG) 25 MG tablet, Take 1 tablet by mouth 2 (Two) Times a Day., Disp: 180 tablet, Rfl: 2  •  Cholecalciferol (VITAMIN D) 50 MCG (2000 UT) tablet, Take 1 tablet by mouth Daily., Disp: , Rfl:   •  cyclobenzaprine (FLEXERIL) 10 MG tablet, Take 1 tablet by mouth At Night As Needed for Muscle Spasms., Disp: 90 tablet, Rfl: 2  •  enalapril (VASOTEC) 5 MG tablet, Take 1 tablet by mouth 2 (Two) Times a Day., Disp: 180 tablet, Rfl: 2  •  esomeprazole (nexIUM) 20 MG capsule, Take 20 mg by mouth Every Morning Before Breakfast., Disp: , Rfl:   •  fluticasone (FLONASE) 50 MCG/ACT nasal spray, 2 sprays into the nostril(s) as directed by provider Daily As Needed for Rhinitis or Allergies., Disp: 16 g, Rfl: 0  •  nitroglycerin (NITROLINGUAL) 0.4 MG/SPRAY spray, Place 1 spray under the tongue Every 5 (Five) Minutes As Needed for Chest Pain., Disp: 1 each, Rfl: 1  •   "rosuvastatin (CRESTOR) 20 MG tablet, Take 1 tablet by mouth Daily., Disp: 90 tablet, Rfl: 2    Allergies   Allergen Reactions   • Vicodin [Hydrocodone-Acetaminophen] Hives   • Atorvastatin Hives   • Propoxyphene Hives       Social History     Tobacco Use   • Smoking status: Former Smoker     Packs/day: 1.00     Years: 10.00     Pack years: 10.00     Types: Cigarettes     Quit date: 1990     Years since quittin.8   • Smokeless tobacco: Never Used   Substance Use Topics   • Alcohol use: Yes     Comment: Occasional       PHQ-2 Depression Screening  Little interest or pleasure in doing things?     Feeling down, depressed, or hopeless?     PHQ-2 Total Score           Review of Systems   Constitutional: Negative for chills and fever.   Eyes: Negative for visual disturbance.   Respiratory: Negative for shortness of breath.    Cardiovascular: Negative for chest pain and palpitations.   Genitourinary: Negative for difficulty urinating.   Musculoskeletal: Positive for arthralgias (with reduced ROM). Negative for gait problem, neck pain and neck stiffness.   Skin: Negative for rash and bruise.   Neurological: Negative for dizziness, tingling, weakness and numbness.         Objective   Vitals:    10/12/20 1534   BP: 122/72   Pulse: 95   Temp: 98.6 °F (37 °C)   TempSrc: Temporal   SpO2: 95%   Weight: 113 kg (248 lb 12.8 oz)   Height: 170.2 cm (67\")     Body mass index is 38.97 kg/m².    Physical Exam  Constitutional:       General: He is not in acute distress.     Appearance: Normal appearance. He is well-developed. He is obese.   HENT:      Head: Normocephalic and atraumatic.      Nose: Nose normal.      Mouth/Throat:      Mouth: Mucous membranes are moist.   Eyes:      General: No scleral icterus.        Right eye: No discharge.         Left eye: No discharge.      Conjunctiva/sclera: Conjunctivae normal.   Neck:      Musculoskeletal: Normal range of motion and neck supple.   Cardiovascular:      Rate and Rhythm: " Normal rate and regular rhythm.      Heart sounds: Normal heart sounds. No murmur.   Pulmonary:      Effort: Pulmonary effort is normal.      Breath sounds: Normal breath sounds.   Musculoskeletal:         General: Tenderness and deformity present. No swelling.      Right shoulder: He exhibits decreased range of motion (Unable to abduct arm greater than 90 degrees both with passive and active motion), tenderness, swelling and deformity (There appears to be some atrophy of the right shoulder compared to the left, the right shoulder sits lower than the left shoulder.). He exhibits no bony tenderness, no crepitus and normal pulse.      Right lower leg: No edema.      Left lower leg: No edema.      Comments: Patient cannot abduct arm past 90 degrees with passive or active range of motion.  He does seem to have normal rotation and is able to get his arm behind his back.   Lymphadenopathy:      Cervical: No cervical adenopathy.   Skin:     General: Skin is warm.      Capillary Refill: Capillary refill takes less than 2 seconds.      Findings: No rash.   Neurological:      Mental Status: He is alert and oriented to person, place, and time.      Coordination: Coordination normal.      Gait: Gait normal.      Deep Tendon Reflexes: Reflexes normal.   Psychiatric:         Mood and Affect: Mood normal.         Behavior: Behavior normal.              Lab Results   Component Value Date     (H) 07/20/2020    BUN  08/08/2020      Comment:      Testing performed by alternate method    BUN 14 08/08/2020    CREATININE 0.92 08/08/2020    EGFRIFNONA 83 08/08/2020    EGFRIFAFRI 90 07/20/2020     08/08/2020    K 4.0 08/08/2020     08/08/2020    CALCIUM 8.3 (L) 08/08/2020    ALBUMIN 4.00 08/08/2020    BILITOT 0.5 08/08/2020    ALKPHOS 52 08/08/2020    AST 23 08/08/2020    ALT 31 08/08/2020    CHLPL 124 07/20/2020    TRIG 144 08/08/2020    HDL 35 (L) 08/08/2020    VLDL 28.8 08/08/2020    LDL 50 08/08/2020    LDLHDL 1.43  08/08/2020    WBC 7.70 08/08/2020    WBC 6.6 07/20/2020    RBC 4.83 08/08/2020    RBC 4.82 07/20/2020    HCT 42.4 08/08/2020    MCV 87.8 08/08/2020    MCH 29.9 08/08/2020    TSH 3.310 07/20/2020    PSA 0.4 07/20/2020          Assessment/Plan     Diagnoses and all orders for this visit:    1. Chronic right shoulder pain (Primary)  -     dexamethasone (DECADRON) injection 10 mg  -     methylPREDNISolone acetate (DEPO-medrol) injection 80 mg  -     methylPREDNISolone (MEDROL) 4 MG dose pack; Take as directed on package instructions.  Dispense: 21 tablet; Refill: 0    2. Need for influenza vaccination  -     FluLaval Quad >6 Months ([id number])    I am concerned that patient has a right shoulder impingement, based on clinical examination.  I did advise him that I do not perform intra-articular steroid injections into the shoulder.  If he is interested in this particular injection, he can follow-up with his primary care doctor.  I also offered him referral to the orthopedic office in [place] for evaluation and possible injection.  He declined.  I offered x-ray.  He declined.  Patient might also benefit from physical therapy, but he is not interested at this time.  I did encourage him to start working on range of motion exercises, as the shoulder is becoming frozen and I think he is developing some muscle atrophy.  Stretching exercises added to after visit summary.  I recommend that he follow-up with his primary care doctor for further management options.    Return if symptoms worsen or fail to improve.     I wore protective equipment throughout this patient encounter to include mask and eye protection. Hand hygiene was performed before donning protective equipment and after removal when leaving the room.  Patient also wore a mask.

## 2020-10-26 DIAGNOSIS — E78.2 MIXED HYPERLIPIDEMIA: ICD-10-CM

## 2020-10-26 RX ORDER — ROSUVASTATIN CALCIUM 20 MG/1
20 TABLET, COATED ORAL DAILY
Qty: 90 TABLET | Refills: 2 | Status: SHIPPED | OUTPATIENT
Start: 2020-10-26 | End: 2021-08-03

## 2020-10-28 DIAGNOSIS — I10 HYPERTENSION, UNSPECIFIED TYPE: ICD-10-CM

## 2020-10-28 RX ORDER — CARVEDILOL 25 MG/1
TABLET ORAL
Qty: 180 TABLET | Refills: 2 | Status: SHIPPED | OUTPATIENT
Start: 2020-10-28 | End: 2021-07-30

## 2020-11-02 ENCOUNTER — OFFICE VISIT (OUTPATIENT)
Dept: FAMILY MEDICINE CLINIC | Facility: CLINIC | Age: 65
End: 2020-11-02

## 2020-11-02 VITALS
HEIGHT: 67 IN | OXYGEN SATURATION: 97 % | TEMPERATURE: 97.3 F | BODY MASS INDEX: 38.92 KG/M2 | HEART RATE: 76 BPM | RESPIRATION RATE: 18 BRPM | WEIGHT: 248 LBS | SYSTOLIC BLOOD PRESSURE: 135 MMHG | DIASTOLIC BLOOD PRESSURE: 75 MMHG

## 2020-11-02 DIAGNOSIS — E66.01 CLASS 2 SEVERE OBESITY DUE TO EXCESS CALORIES WITH SERIOUS COMORBIDITY AND BODY MASS INDEX (BMI) OF 39.0 TO 39.9 IN ADULT (HCC): Primary | ICD-10-CM

## 2020-11-02 DIAGNOSIS — H92.02 LEFT EAR PAIN: ICD-10-CM

## 2020-11-02 DIAGNOSIS — Z87.891 HISTORY OF TOBACCO USE: ICD-10-CM

## 2020-11-02 PROBLEM — E66.812 CLASS 2 SEVERE OBESITY DUE TO EXCESS CALORIES WITH SERIOUS COMORBIDITY AND BODY MASS INDEX (BMI) OF 39.0 TO 39.9 IN ADULT: Status: ACTIVE | Noted: 2019-12-06

## 2020-11-02 PROCEDURE — 99213 OFFICE O/P EST LOW 20 MIN: CPT | Performed by: FAMILY MEDICINE

## 2020-11-02 RX ORDER — CEPHALEXIN 500 MG/1
500 CAPSULE ORAL 3 TIMES DAILY
Qty: 30 CAPSULE | Refills: 0 | Status: SHIPPED | OUTPATIENT
Start: 2020-11-02 | End: 2020-11-12

## 2020-11-02 NOTE — PROGRESS NOTES
Subjective   John Wilburn is a 65 y.o. male.     Chief Complaint   Patient presents with   • Earache       Earache   There is pain in the left ear. This is a new problem. The current episode started in the past 7 days. The problem occurs constantly. There has been no fever. The pain is at a severity of 10/10. The pain is severe. Associated symptoms include headaches. Pertinent negatives include no abdominal pain, coughing or sore throat. Treatments tried: tylenol. The treatment provided no relief.            I personally reviewed and updated the patient's allergies, medications, problem list, and past medical, surgical, social, and family history. I have reviewed and confirmed the accuracy of the History of Present Illness and Review of Symptoms as documented by the MA/LPN/RN. Kian Weston MD    Family History   Problem Relation Age of Onset   • Hypertension Mother    • Diabetes Mother    • Heart disease Mother    • Heart attack Father    • Rheumatic fever Father    • No Known Problems Sister    • No Known Problems Brother    • No Known Problems Maternal Aunt    • No Known Problems Maternal Uncle    • No Known Problems Paternal Aunt    • No Known Problems Paternal Uncle    • No Known Problems Maternal Grandmother    • No Known Problems Maternal Grandfather    • No Known Problems Paternal Grandmother    • No Known Problems Paternal Grandfather    • No Known Problems Other    • Anemia Neg Hx    • Arrhythmia Neg Hx    • Asthma Neg Hx    • Clotting disorder Neg Hx    • Fainting Neg Hx    • Heart failure Neg Hx    • Hyperlipidemia Neg Hx        Social History     Tobacco Use   • Smoking status: Former Smoker     Packs/day: 1.00     Years: 10.00     Pack years: 10.00     Types: Cigarettes     Quit date: 1990     Years since quittin.8   • Smokeless tobacco: Never Used   Substance Use Topics   • Alcohol use: Yes     Comment: Occasional   • Drug use: No       Past Surgical History:   Procedure Laterality Date    • CATARACT EXTRACTION, BILATERAL Bilateral    • CORONARY STENT PLACEMENT  2003   • EYE SURGERY  10/16/2019    repair detached retina   • NASAL SEPTUM SURGERY         Patient Active Problem List   Diagnosis   • Acute myocardial infarction, subsequent episode of care (CMS/[facility])   • Acute right-sided low back pain with right-sided sciatica   • Allergic rhinitis   • Coronary angioplasty status   • Coronary atherosclerosis   • Edema   • Encounter for annual general medical examination with abnormal findings in adult   • Colon cancer screening   • Screening PSA (prostate specific antigen)   • GERD (gastroesophageal reflux disease)   • Hepatitis B   • Herpes zoster   • History of tobacco use   • HTN (hypertension), benign   • Hyperlipidemia   • Lumbar disc disease   • Hematoma   • Vertiginous syndrome and labyrinthine disorder   • Myalgia   • Nevus   • Obstructive sleep apnea   • Class 2 severe obesity due to excess calories with serious comorbidity and body mass index (BMI) of 39.0 to 39.9 in adult (CMS/[facility])   • Right hip pain   • Vitamin D deficiency   • Tendinitis of right rotator cuff   • Left ear pain   • Acute bacterial sinusitis   • Unstable angina (CMS/[facility])   • Chest pain in adult         Current Outpatient Medications:   •  aspirin 325 MG tablet, Take 1 tablet by mouth Daily., Disp: , Rfl:   •  carvedilol (COREG) 25 MG tablet, TAKE 1 TABLET BY MOUTH TWICE DAILY, Disp: 180 tablet, Rfl: 2  •  Cholecalciferol (VITAMIN D) 50 MCG (2000 UT) tablet, Take 1 tablet by mouth Daily., Disp: , Rfl:   •  enalapril (VASOTEC) 5 MG tablet, Take 1 tablet by mouth 2 (Two) Times a Day., Disp: 180 tablet, Rfl: 2  •  esomeprazole (nexIUM) 20 MG capsule, Take 20 mg by mouth Every Morning Before Breakfast., Disp: , Rfl:   •  fluticasone (FLONASE) 50 MCG/ACT nasal spray, 2 sprays into the nostril(s) as directed by provider Daily As Needed for Rhinitis or Allergies., Disp: 16 g, Rfl: 0  •  nitroglycerin (NITROLINGUAL) 0.4 MG/SPRAY spray,  "Place 1 spray under the tongue Every 5 (Five) Minutes As Needed for Chest Pain., Disp: 1 each, Rfl: 1  •  rosuvastatin (CRESTOR) 20 MG tablet, TAKE 1 TABLET BY MOUTH DAILY, Disp: 90 tablet, Rfl: 2  •  cyclobenzaprine (FLEXERIL) 10 MG tablet, Take 1 tablet by mouth At Night As Needed for Muscle Spasms., Disp: 90 tablet, Rfl: 2  •  methylPREDNISolone (MEDROL) 4 MG dose pack, Take as directed on package instructions., Disp: 21 tablet, Rfl: 0         Review of Systems   Constitutional: Negative for chills and diaphoresis.   HENT: Positive for ear pain. Negative for sore throat, trouble swallowing and voice change.    Eyes: Negative for visual disturbance.   Respiratory: Negative for cough and shortness of breath.    Cardiovascular: Negative for chest pain and palpitations.   Gastrointestinal: Negative for abdominal pain and nausea.   Endocrine: Negative for polydipsia and polyphagia.   Genitourinary: Negative for hematuria.   Musculoskeletal: Negative for neck stiffness.   Skin: Negative for color change and pallor.   Allergic/Immunologic: Negative for immunocompromised state.   Neurological: Negative for seizures and syncope.   Hematological: Negative for adenopathy.   Psychiatric/Behavioral: Negative for hallucinations, sleep disturbance and suicidal ideas.       I have reviewed and confirmed the accuracy of the ROS as documented by the MA/LPN/RN Kian Weston MD      Objective   /75 (BP Location: Right arm, Patient Position: Sitting, Cuff Size: Adult)   Pulse 76   Temp 97.3 °F (36.3 °C)   Resp 18   Ht 170.2 cm (67\")   Wt 112 kg (248 lb)   SpO2 97%   BMI 38.84 kg/m²   BP Readings from Last 3 Encounters:   11/02/20 135/75   10/12/20 122/72   08/08/20 133/86     Wt Readings from Last 3 Encounters:   11/02/20 112 kg (248 lb)   10/12/20 113 kg (248 lb 12.8 oz)   08/08/20 111 kg (244 lb 11.4 oz)     Physical Exam  HENT:      Right Ear: Hearing, tympanic membrane, ear canal and external ear normal.      Left " Ear: Hearing, tympanic membrane, ear canal and external ear normal.      Nose: Nose normal. No mucosal edema or congestion.      Right Sinus: No maxillary sinus tenderness or frontal sinus tenderness.      Left Sinus: No maxillary sinus tenderness or frontal sinus tenderness.      Mouth/Throat:      Mouth: No oral lesions.      Pharynx: Uvula midline. No oropharyngeal exudate or posterior oropharyngeal erythema.      Tonsils: No tonsillar exudate.         Recent Lab Results:    Hemoglobin A1C   Date Value Ref Range Status   08/08/2020 5.5 3.5 - 5.6 % Final     Lab Results   Component Value Date     (H) 07/20/2020     Lab Results   Component Value Date    LDL 50 08/08/2020    LDL 50 07/20/2020    LDL 67 04/05/2019     Lab Results   Component Value Date    CHOL 114 08/08/2020    CHOL 132 04/05/2019    CHOL 130 10/19/2018     Lab Results   Component Value Date    TRIG 144 08/08/2020    TRIG 175 (H) 07/20/2020    TRIG 114 04/05/2019     Lab Results   Component Value Date    HDL 35 (L) 08/08/2020    HDL 39 (L) 07/20/2020    HDL 45 04/05/2019     Lab Results   Component Value Date    PSA 0.4 07/20/2020    PSA 0.3 04/05/2019    PSA 0.4 04/03/2018     Lab Results   Component Value Date    WBC 7.70 08/08/2020    HGB 14.5 08/08/2020    HCT 42.4 08/08/2020    MCV 87.8 08/08/2020     08/08/2020     Lab Results   Component Value Date    TSH 3.310 07/20/2020      Lab Results   Component Value Date    GLUCOSE 111 (H) 08/08/2020    BUN  08/08/2020      Comment:      Testing performed by alternate method    BUN 14 08/08/2020    CREATININE 0.92 08/08/2020    EGFRIFNONA 83 08/08/2020    EGFRIFAFRI 90 07/20/2020    BCR  08/08/2020      Comment:      Testing not performed    K 4.0 08/08/2020    CO2 25.0 08/08/2020    CALCIUM 8.3 (L) 08/08/2020    PROTENTOTREF 6.5 07/20/2020    ALBUMIN 4.00 08/08/2020    LABIL2 1.8 07/20/2020    AST 23 08/08/2020    ALT 31 08/08/2020     No results found for: MAT, RF, SEDRATE   No results  found for: CRP   No results found for: IRON, TIBC, FERRITIN   Lab Results   Component Value Date    BEXEXWIK14 481 04/03/2018          Assessment/Plan      Medications        Problem List         LOS    Acute bacterial sinusitis.    Improving on antibiotics.  Allergic rhinitis.  Over-the-counter Claritin/Zyrtec.  Health maintenance.  Doing well, vaccines current.  Baby aspirin daily.  Discussed health maintenance, screening test, lifestyle modification.  Coronary artery disease.  Stable, followed by cardiology Dr. escobedo.  History of repeat stenting 2015.  Continue risk factor reduction.  Remote history of carotid Dopplers, recommend repeat, he wants to discuss with his cardiologist.  Hypertension.  Good control.  Hyperlipidemia.  Improved on Crestor.  Keto diet okay, Follow-up recheck fasting labs.  Retinal detachment.  December/2019.  Improved status post surgery.  Followed by ophthalmology.  Lumbar disc disease.  Followed by pain management/neurosurgery, has had repeat imagery..  s/p course epidural, consider radiofrequency ablation.  Prostate screening.  Follow-up check PSA.  GERD.  Stable on PPI.  EGD benign/dilation only 2019.  Colon cancer screening.  Colonoscopy benign 2019.  Repeat eval 10 years.  IRENE.  Stable on CPAP.  Rotator cuff tendinitis.    Right.  Much improved today status post injection.  Rehabilitation exercises discussed.  Consider imaging, Ortho referral if persistent symptoms.  Recommend follow-up visit for comprehensive physical exam screening test.      Problem List Items Addressed This Visit        Unprioritized    History of tobacco use    Class 2 severe obesity due to excess calories with serious comorbidity and body mass index (BMI) of 39.0 to 39.9 in adult (CMS/[facility]) - Primary    Left ear pain              Expected course, medications, and adverse effects discussed.  Call or return if worsening or persistent symptoms.  I wore protective equipment throughout this patient encounter  including a mask, gloves, and eye protection.  Hand hygiene was performed before donning protective equipment and after removal when leaving the room.

## 2020-11-04 DIAGNOSIS — M54.41 ACUTE RIGHT-SIDED LOW BACK PAIN WITH RIGHT-SIDED SCIATICA: ICD-10-CM

## 2020-11-04 RX ORDER — CYCLOBENZAPRINE HCL 10 MG
10 TABLET ORAL NIGHTLY PRN
Qty: 90 TABLET | Refills: 2 | Status: SHIPPED | OUTPATIENT
Start: 2020-11-04 | End: 2022-08-29 | Stop reason: SDUPTHER

## 2020-11-09 ENCOUNTER — TELEPHONE (OUTPATIENT)
Dept: FAMILY MEDICINE CLINIC | Facility: CLINIC | Age: 65
End: 2020-11-09

## 2020-11-09 NOTE — TELEPHONE ENCOUNTER
Patient called stating that he is still having ear pain and the Keflex hasn't helped. Please advise

## 2020-11-11 DIAGNOSIS — J01.90 ACUTE BACTERIAL SINUSITIS: Primary | ICD-10-CM

## 2020-11-11 DIAGNOSIS — B96.89 ACUTE BACTERIAL SINUSITIS: Primary | ICD-10-CM

## 2020-11-11 RX ORDER — CIPROFLOXACIN 500 MG/1
500 TABLET, FILM COATED ORAL 2 TIMES DAILY
Qty: 20 TABLET | Refills: 0 | Status: SHIPPED | OUTPATIENT
Start: 2020-11-11 | End: 2020-11-21

## 2020-11-11 NOTE — TELEPHONE ENCOUNTER
Okay to send ciprofloxacin 500 mg twice daily for 10 days, continue antihistamine, he should let me know if he does not improve, thanks

## 2020-11-14 DIAGNOSIS — I10 HTN (HYPERTENSION), BENIGN: ICD-10-CM

## 2020-11-16 RX ORDER — ENALAPRIL MALEATE 5 MG/1
TABLET ORAL
Qty: 180 TABLET | Refills: 2 | Status: SHIPPED | OUTPATIENT
Start: 2020-11-16 | End: 2020-12-04

## 2020-12-03 ENCOUNTER — LAB (OUTPATIENT)
Dept: LAB | Facility: HOSPITAL | Age: 65
End: 2020-12-03

## 2020-12-03 DIAGNOSIS — E78.5 DYSLIPIDEMIA: ICD-10-CM

## 2020-12-03 DIAGNOSIS — I25.10 CAD S/P PERCUTANEOUS CORONARY ANGIOPLASTY: ICD-10-CM

## 2020-12-03 DIAGNOSIS — Z98.61 CAD S/P PERCUTANEOUS CORONARY ANGIOPLASTY: ICD-10-CM

## 2020-12-03 DIAGNOSIS — I10 ESSENTIAL HYPERTENSION: ICD-10-CM

## 2020-12-03 LAB — CK SERPL-CCNC: 95 U/L (ref 20–200)

## 2020-12-03 PROCEDURE — 36415 COLL VENOUS BLD VENIPUNCTURE: CPT

## 2020-12-03 PROCEDURE — 82550 ASSAY OF CK (CPK): CPT

## 2020-12-04 ENCOUNTER — OFFICE VISIT (OUTPATIENT)
Dept: CARDIOLOGY | Facility: CLINIC | Age: 65
End: 2020-12-04

## 2020-12-04 VITALS
BODY MASS INDEX: 39.24 KG/M2 | DIASTOLIC BLOOD PRESSURE: 80 MMHG | WEIGHT: 250 LBS | HEART RATE: 60 BPM | OXYGEN SATURATION: 99 % | SYSTOLIC BLOOD PRESSURE: 146 MMHG | HEIGHT: 67 IN | TEMPERATURE: 98.4 F

## 2020-12-04 DIAGNOSIS — Z98.61 CAD S/P PERCUTANEOUS CORONARY ANGIOPLASTY: ICD-10-CM

## 2020-12-04 DIAGNOSIS — I10 ESSENTIAL HYPERTENSION: ICD-10-CM

## 2020-12-04 DIAGNOSIS — I25.10 CAD S/P PERCUTANEOUS CORONARY ANGIOPLASTY: ICD-10-CM

## 2020-12-04 DIAGNOSIS — R23.2 FACIAL FLUSHING: Primary | ICD-10-CM

## 2020-12-04 DIAGNOSIS — E78.5 DYSLIPIDEMIA: ICD-10-CM

## 2020-12-04 PROCEDURE — 99214 OFFICE O/P EST MOD 30 MIN: CPT | Performed by: INTERNAL MEDICINE

## 2020-12-04 RX ORDER — LOSARTAN POTASSIUM 25 MG/1
25 TABLET ORAL DAILY
Qty: 90 TABLET | Refills: 3 | Status: SHIPPED | OUTPATIENT
Start: 2020-12-04 | End: 2021-11-22

## 2020-12-04 NOTE — PROGRESS NOTES
Subjective:     Encounter Date:12/04/2020      Patient ID: John Wilburn is a 65 y.o. male.    Chief Complaint : Follow-up for CAD, PCI  History of Present Illness      This is a 65-year with PMH of    #  CAD,h/o MI, multiple PCIs in [place] in the past, NSTEMI and MIGUEL to RCA 6/18/15 and LCX 06/24/2015, cath 7/8/2015 Patent stents in RCA and LCX,Borderline disease in ramus intermedius and moderate disease in proximal LAD    #.  Hypertension,  #  dyslipidemia,  Lipitor intolerant  #.  PLAVIX  RESISTANT  #.  obesity, hyperglycemia with normal hemoglobin A1c, 5.6 on  7/2015  #.   Former smoker quit in 1990  #    positive family history of heart disease in father and mother  #    allergy/intolerance  to Lipitor Darvocet and Vicodin    Here for  follow-up.  Patient is complaining of face feeling flushed and hot, usually with laughing, denies any chest pain shortness of breath lightheadedness dizziness loss of consciousness.   Patient  had labs done 10/19/2018 which showed cholesterol 130 triglycerides 77 HDL 40 LDL 71 CMP was unremarkable. Labs from 4/5/2019 revealed normal CMP TSH 3.74 cholesterol 132 HDL 45 LDL 67 triglycerides 114.  Labs from 8/8/2020 reveal CMP with a glucose of 111, calcium of 8.3, cholesterol 149 triglycerides 144 HDL low at 35 LDL 50    Patient's arterial blood pressure is 146/80, heart rate 60, O2 sat of 99% on room air.  Denies any blurred vision headaches.         ASSESSMENT:  #Facial flushing  # dyslipidemia  #  CAD history of MI and PCI  #  obesity, glucose intolerance,   # hypertension,      PLAN:  We will change Vasotec to losartan to see if it will help with facial flushing.  Reviewed EKG and lab results with patient  We will check TSH CMP and lipid profile.  Will continue statin to Crestor 10 mg  Continue aspirin, carvedilol,  as tolerated  Advised flu shot to patient.  Counseled on walking exercise for low HDL.  Patient's BMI is 39.1 would benefit from weight loss diet  exercise        Assessment:          Diagnosis Plan   1. Facial flushing  Comprehensive Metabolic Panel    Lipid Panel    TSH   2. CAD S/P percutaneous coronary angioplasty  Comprehensive Metabolic Panel    Lipid Panel    TSH   3. Essential hypertension  Comprehensive Metabolic Panel    Lipid Panel    TSH   4. Dyslipidemia  Comprehensive Metabolic Panel    Lipid Panel    TSH          Plan:         Past Medical History:  Past Medical History:   Diagnosis Date   • Acute bronchitis    • Acute myocardial infarction, subsequent episode of care (CMS/[facility])    • Acute non-recurrent frontal sinusitis    • Acute right-sided low back pain with right-sided sciatica     Impression: persistent, long standing back pain, worse, with rle radiculopathy, h/o vertebral fx, refractory to pt xray with severe degenerative changes l5/ s1 check mri, referrall to dr amarilys arias 20 minutes bid nsaids Rehabilitation exercises discussed. continue pt   • Allergic rhinitis    • Allergy to poison ivy     Impression: Due to the wide spread rash he was started on Prednisone per pt request. He was advised to RTC if his symptoms did not improve.   • Annual visit for general adult medical examination with abnormal findings     Impression: doing well, vaccines current Age specific anticipatory guidance and warning signs discussed. Diet, exercise, and lifestyle modification discussed. Safety, seatbelts, and routine screening examinations discussed. Discussed self-examinations.   • Bronchitis    • Cellulitis of buttock     Impression: possibly 2nd insect bite Topical care discussed. Discussed possible necessity of I and D. Call / return if fever, worsening symptoms.   • Chest pain     Impression: atypical, improved / resolved currently possibly 2nd nausea /gi upset /viral uri serial ekg's, troponin's normal followed by cardiology in [place], he reports negative treadmill cardiolyte 12/11 d/c to home, Follow up 2 to 3 days. Follow up with cardiology planned  consider repeat stress test if chest pain on exertion, worsening symptoms.   • Colon cancer screening     Impression: has a colonoscopy, will get records   • Conjunctivitis     Impression: viral Topical care discussed. wash hands frequently   • Coronary atherosclerosis     Impression: h/o stent 2003, 2016 followed by meng, had recent neg stress test, will get records h/o normal carptid doppler per her report, will get records continue coreg, statin, effient continue risk factor reduction recommend cardiology Follow up he states h3 will consider   • Detached retina    • GERD (gastroesophageal reflux disease)    • Hematoma     Impression: hand, much improved xray neg for fracture per ed Signs and symptoms of infecton discussed. Call / return if worsening symptoms.   • Hepatitis B     Impression: h/o, recheck levels   • Herpes zoster     Impression: topical care discussed cover with antibiotics Follow up for zostavax   • History of tobacco use    • HTN (hypertension), benign     Impression: good control Discussed low sodium diet, lifestyle modification. Follow up recheck   • Hyperlipidemia     Impression: followed by Roxana improved tolerating crestor rx, ldl to 71, + aggressive ldl target considering cad recheck   • Lumbar disc disease     Impression: followed by Nick Ashton undergoing epidual injxn   • Malaise and fatigue     Impression: check vitamin levels h/o low t do not recommend replacement consider age enedelia under good cobtrol consider wellbutrin Follow up recheck Call / return if worsening symptoms.   • Myalgia     Impression: possibly 2nd crestor, hold x 1 to 2 mos risk/benefit RX discussed, considering restart possibly at lower dose   • Nevus     Impression: path shows benign lentigo Discussed skin care, use of sun block and protective clothing.   • Obesity    • Obesity, morbid, BMI 40.0-49.9 (CMS/[facility])     DOCUMENTATION OF FOLLOW-UP PLAN FOR PATIENT WITH BMI ABOVE NORMAL ()   • Overweight (BMI  25.0-29.9)     Impression: Discussed diet, exercise, lifestyle modification. Follow up [id number]   • Pruritus    • Right hip pain     Impression: check xray   • Screening for depression     Negative Depression Screening (4 or less) ()   • Screening PSA (prostate specific antigen)     Impression: psa normal / damián normal   • Sinusitis, bacterial     Impression: He was prescribed Cipro and Tessalon perles. Increase fluids. Tylenol/motrin for pain or fever. Medication and medication adverse effects discussed. Follow-up 5-7 days for reevaluation if not improved or sooner if needed.   • Skin lesion     Impression: rec resection / derm ref sched   • Sleep apnea, obstructive     Story: on CPAP   • URI (upper respiratory infection)     Impression: Increase fluid intake. Mucinex Call / return if fever, respiratory difficulty, worsening symptoms.   • Vertiginous syndrome and labyrinthine disorder     Impression: Differential diagnosis discussed. Follow-up in 2 weeks if not better. Follow-up sooner for worsening symptoms or for any concerns. Zyrtec as directed.   • Vertigo     Impression: likely secondary to sinusitis with eustachian tube dysfunction, rx in progress ddx includes vestibular neuritis, cover with prednisone Follow up recheck Consider imaging if persistent symptoms.   • Vitamin D deficiency      Past Surgical History:  Past Surgical History:   Procedure Laterality Date   • CATARACT EXTRACTION, BILATERAL Bilateral    • CORONARY STENT PLACEMENT  2003   • EYE SURGERY  10/16/2019    repair detached retina   • NASAL SEPTUM SURGERY        Allergies:  Allergies   Allergen Reactions   • Vicodin [Hydrocodone-Acetaminophen] Hives   • Atorvastatin Hives   • Propoxyphene Hives     Home Meds:  Current Meds:     Current Outpatient Medications:   •  aspirin 325 MG tablet, Take 1 tablet by mouth Daily., Disp: , Rfl:   •  carvedilol (COREG) 25 MG tablet, TAKE 1 TABLET BY MOUTH TWICE DAILY, Disp: 180 tablet, Rfl: 2  •   Cholecalciferol (VITAMIN D) 50 MCG (2000) tablet, Take 1 tablet by mouth Daily., Disp: , Rfl:   •  esomeprazole (nexIUM) 20 MG capsule, Take 20 mg by mouth Every Morning Before Breakfast., Disp: , Rfl:   •  fluticasone (FLONASE) 50 MCG/ACT nasal spray, 2 sprays into the nostril(s) as directed by provider Daily As Needed for Rhinitis or Allergies., Disp: 16 g, Rfl: 0  •  nitroglycerin (NITROLINGUAL) 0.4 MG/SPRAY spray, Place 1 spray under the tongue Every 5 (Five) Minutes As Needed for Chest Pain., Disp: 1 each, Rfl: 1  •  rosuvastatin (CRESTOR) 20 MG tablet, TAKE 1 TABLET BY MOUTH DAILY, Disp: 90 tablet, Rfl: 2  •  cyclobenzaprine (FLEXERIL) 10 MG tablet, TAKE 1 TABLET BY MOUTH AT NIGHT AS NEEDED FOR MUSCLE SPASMS, Disp: 90 tablet, Rfl: 2  •  losartan (Cozaar) 25 MG tablet, Take 1 tablet by mouth Daily., Disp: 90 tablet, Rfl: 3  Social History:   Social History     Tobacco Use   • Smoking status: Former Smoker     Packs/day: 1.00     Years: 10.00     Pack years: 10.00     Types: Cigarettes     Quit date: 1990     Years since quittin.9   • Smokeless tobacco: Never Used   Substance Use Topics   • Alcohol use: Yes     Comment: Occasional      Family History:  Family History   Problem Relation Age of Onset   • Hypertension Mother    • Diabetes Mother    • Heart disease Mother    • Heart attack Father    • Rheumatic fever Father    • No Known Problems Sister    • No Known Problems Brother    • No Known Problems Maternal Aunt    • No Known Problems Maternal Uncle    • No Known Problems Paternal Aunt    • No Known Problems Paternal Uncle    • No Known Problems Maternal Grandmother    • No Known Problems Maternal Grandfather    • No Known Problems Paternal Grandmother    • No Known Problems Paternal Grandfather    • No Known Problems Other    • Anemia Neg Hx    • Arrhythmia Neg Hx    • Asthma Neg Hx    • Clotting disorder Neg Hx    • Fainting Neg Hx    • Heart failure Neg Hx    • Hyperlipidemia Neg Hx      "    The following portions of the patient's history were reviewed and updated as appropriate: allergies, current medications, past family history, past medical history, past social history, past surgical history and problem list.      Review of Systems   Constitution: Positive for malaise/fatigue.   Cardiovascular: Negative for chest pain, leg swelling and palpitations.   Respiratory: Negative for shortness of breath.    Skin: Positive for flushing. Negative for rash.   Neurological: Positive for light-headedness. Negative for dizziness and numbness.     All other systems are negative    Procedures EKG done 8/7/2020 reviewed by me shows sinus rhythm with rate of 77 bpm with old inferior wall MI       Objective:     Physical Exam  /80   Pulse 60   Temp 98.4 °F (36.9 °C)   Ht 170.2 cm (67\")   Wt 113 kg (250 lb)   SpO2 99%   BMI 39.16 kg/m²   General:  Appears in no acute distress  Eyes: Sclera is anicteric,  conjunctiva is clear   HEENT:  No JVD. Thyroid not visibly enlarged. No mucosal pallor or cyanosis  Respiratory: Respirations regular and unlabored at rest.  Clear to auscultation  Cardiovascular: S1,S2 Regular rate and rhythm. No murmur, rub or gallop auscultated. No pretibial pitting edema  Gastrointestinal: Abdomen nondistended, soft  Musculoskeletal:  No abnormal movements  Extremities: No digital clubbing or cyanosis  Skin: Color pink. Skin warm and dry to touch. No rashes  No xanthoma  Neuro: Alert and awake, no lateralizing deficits appreciated    Lab Reviewed:                  "

## 2021-02-05 NOTE — PROGRESS NOTES
Subjective   John Wilburn is a 65 y.o. male.     Chief Complaint   Patient presents with   • Annual Exam   • Hyperlipidemia   • Hypertension       The patient is here: to discuss health maintenance and disease prevention to follow up on multiple medical problems.  Last comprehensive physical was on 1/27/2020.  Previous physical was performed by  Kian Weston MD  Overall has: moderate activity with work/home activities, exercises 2 - 3 times per week, good appetite, feels well with minor complaints, good energy level and is sleeping well. Nutrition: balanced diet and eating a variety of foods. Last tetanus shot was >10 years ago  Patient's last stress test was: 8/8/2020 Patient's last PSA was: 0.4 on 7/28/2020   Last Completed Colonoscopy       Status Date      COLONOSCOPY Done 5/16/2019  COLONOSCOPY  Colonoscopy           Patient has more history with this topic...          Hyperlipidemia  This is a chronic problem. The current episode started more than 1 year ago. Exacerbating diseases include obesity. He has no history of diabetes. Pertinent negatives include no chest pain, focal weakness, leg pain, myalgias or shortness of breath. Current antihyperlipidemic treatment includes statins. The current treatment provides mild improvement of lipids. There are no compliance problems.  Risk factors for coronary artery disease include dyslipidemia, hypertension, male sex and obesity.   Hypertension  This is a chronic problem. The current episode started more than 1 year ago. The problem is unchanged. Associated symptoms include headaches. Pertinent negatives include no anxiety, chest pain, neck pain, palpitations, shortness of breath or sweats. Risk factors for coronary artery disease include male gender, dyslipidemia and obesity. Current antihypertension treatment includes beta blockers and ACE inhibitors. The current treatment provides moderate improvement. There are no compliance problems.    Blood Sugar  Problem  This is a recurrent problem. The current episode started more than 1 year ago. The problem has been gradually worsening. Associated symptoms include headaches. Pertinent negatives include no abdominal pain, chest pain, chills, diaphoresis, myalgias, nausea or neck pain.        Recent Hospitalizations:  No hospitalization(s) within the last year..  ccc      I personally reviewed and updated the patient's allergies, medications, problem list, and past medical, surgical, social, and family history. I have reviewed and confirmed the accuracy of the HPI and ROS as documented by the MA/LPN/RN Kian Weston MD    Family History   Problem Relation Age of Onset   • Hypertension Mother    • Diabetes Mother    • Heart disease Mother    • Heart attack Father    • Rheumatic fever Father    • No Known Problems Sister    • No Known Problems Brother    • No Known Problems Maternal Aunt    • No Known Problems Maternal Uncle    • No Known Problems Paternal Aunt    • No Known Problems Paternal Uncle    • No Known Problems Maternal Grandmother    • No Known Problems Maternal Grandfather    • No Known Problems Paternal Grandmother    • No Known Problems Paternal Grandfather    • No Known Problems Other    • Anemia Neg Hx    • Arrhythmia Neg Hx    • Asthma Neg Hx    • Clotting disorder Neg Hx    • Fainting Neg Hx    • Heart failure Neg Hx    • Hyperlipidemia Neg Hx        Social History     Tobacco Use   • Smoking status: Former Smoker     Packs/day: 1.00     Years: 10.00     Pack years: 10.00     Types: Cigarettes     Start date:      Quit date: 1990     Years since quittin.   • Smokeless tobacco: Never Used   Substance Use Topics   • Alcohol use: Yes     Frequency: 2-3 times a week     Drinks per session: 1 or 2   • Drug use: No       Past Surgical History:   Procedure Laterality Date   • CATARACT EXTRACTION, BILATERAL Bilateral    • CORONARY STENT PLACEMENT     • EYE SURGERY  10/16/2019    repair detached  retina   • NASAL SEPTUM SURGERY         Patient Active Problem List   Diagnosis   • Acute myocardial infarction, subsequent episode of care (CMS/[facility])   • Acute right-sided low back pain with right-sided sciatica   • Allergic rhinitis   • Coronary angioplasty status   • Coronary atherosclerosis   • Edema   • Encounter for annual general medical examination with abnormal findings in adult   • Colon cancer screening   • Screening PSA (prostate specific antigen)   • GERD (gastroesophageal reflux disease)   • Hepatitis B   • Herpes zoster   • History of tobacco use   • HTN (hypertension), benign   • Hyperlipidemia   • Lumbar disc disease   • Hematoma   • Vertiginous syndrome and labyrinthine disorder   • Myalgia   • Nevus   • Obstructive sleep apnea   • Class 2 severe obesity due to excess calories with serious comorbidity and body mass index (BMI) of 39.0 to 39.9 in adult (CMS/[facility])   • Right hip pain   • Vitamin D deficiency   • Tendinitis of right rotator cuff   • Unstable angina (CMS/[facility])   • Chest pain in adult   • Elevated fasting glucose         Current Outpatient Medications:   •  aspirin 325 MG tablet, Take 1 tablet by mouth Daily., Disp: , Rfl:   •  carvedilol (COREG) 25 MG tablet, TAKE 1 TABLET BY MOUTH TWICE DAILY, Disp: 180 tablet, Rfl: 2  •  Cholecalciferol (VITAMIN D) 50 MCG (2000 UT) tablet, Take 1 tablet by mouth Daily., Disp: , Rfl:   •  cyclobenzaprine (FLEXERIL) 10 MG tablet, TAKE 1 TABLET BY MOUTH AT NIGHT AS NEEDED FOR MUSCLE SPASMS, Disp: 90 tablet, Rfl: 2  •  esomeprazole (nexIUM) 20 MG capsule, Take 20 mg by mouth Every Morning Before Breakfast., Disp: , Rfl:   •  fluticasone (FLONASE) 50 MCG/ACT nasal spray, 2 sprays into the nostril(s) as directed by provider Daily As Needed for Rhinitis or Allergies., Disp: 16 g, Rfl: 0  •  losartan (Cozaar) 25 MG tablet, Take 1 tablet by mouth Daily., Disp: 90 tablet, Rfl: 3  •  nitroglycerin (NITROLINGUAL) 0.4 MG/SPRAY spray, Place 1 spray under the tongue  "Every 5 (Five) Minutes As Needed for Chest Pain., Disp: 1 each, Rfl: 1  •  rosuvastatin (CRESTOR) 20 MG tablet, TAKE 1 TABLET BY MOUTH DAILY, Disp: 90 tablet, Rfl: 2    Review of Systems   Constitutional: Negative for chills and diaphoresis.   HENT: Negative for trouble swallowing and voice change.    Eyes: Negative for visual disturbance.   Respiratory: Negative for shortness of breath.    Cardiovascular: Negative for chest pain and palpitations.   Gastrointestinal: Negative for abdominal pain and nausea.   Endocrine: Negative for polydipsia and polyphagia.   Genitourinary: Negative for hematuria.   Musculoskeletal: Negative for myalgias, neck pain and neck stiffness.   Skin: Negative for color change and pallor.   Allergic/Immunologic: Negative for immunocompromised state.   Neurological: Negative for focal weakness, seizures and syncope.   Hematological: Negative for adenopathy.   Psychiatric/Behavioral: Negative for hallucinations, sleep disturbance and suicidal ideas.       I have reviewed and confirmed the accuracy of the ROS as documented by the MA/LPN/RN Kian Weston MD      Objective   /72 (BP Location: Right arm, Patient Position: Sitting)   Pulse 80   Temp 98.6 °F (37 °C)   Resp 18   Ht 170.2 cm (67\")   Wt 118 kg (260 lb 6.4 oz)   SpO2 95%   BMI 40.78 kg/m²   BP Readings from Last 3 Encounters:   02/08/21 118/72   12/04/20 146/80   11/02/20 135/75     Wt Readings from Last 3 Encounters:   02/08/21 118 kg (260 lb 6.4 oz)   12/04/20 113 kg (250 lb)   11/02/20 112 kg (248 lb)     Physical Exam  Constitutional:       Appearance: Normal appearance. He is well-developed. He is not diaphoretic.   HENT:      Head: Normocephalic.      Right Ear: Tympanic membrane, ear canal and external ear normal.      Left Ear: Tympanic membrane, ear canal and external ear normal.      Nose: Nose normal.   Eyes:      General: Lids are normal.      Conjunctiva/sclera: Conjunctivae normal.      Pupils: Pupils are " equal, round, and reactive to light.   Neck:      Thyroid: No thyromegaly.      Vascular: No carotid bruit or JVD.      Trachea: No tracheal deviation.   Cardiovascular:      Rate and Rhythm: Normal rate and regular rhythm.      Pulses: Normal pulses.      Heart sounds: Normal heart sounds, S1 normal and S2 normal. No murmur. No friction rub. No gallop.    Pulmonary:      Effort: Pulmonary effort is normal. No accessory muscle usage.      Breath sounds: Normal breath sounds. No stridor. No decreased breath sounds, wheezing, rhonchi or rales.   Abdominal:      General: Bowel sounds are normal. There is no distension.      Palpations: Abdomen is soft. Abdomen is not rigid. There is no mass or pulsatile mass.      Tenderness: There is no abdominal tenderness. There is no guarding or rebound. Negative signs include Khanna's sign.      Hernia: No hernia is present.   Lymphadenopathy:      Head:      Right side of head: No submental, submandibular, tonsillar, preauricular, posterior auricular or occipital adenopathy.      Left side of head: No submental, submandibular, tonsillar, preauricular, posterior auricular or occipital adenopathy.      Cervical: No cervical adenopathy.   Skin:     General: Skin is warm and dry.      Coloration: Skin is not pale.   Neurological:      Mental Status: He is alert and oriented to person, place, and time.      Cranial Nerves: No cranial nerve deficit.      Sensory: No sensory deficit.      Coordination: Coordination normal.      Gait: Gait normal.      Deep Tendon Reflexes: Reflexes are normal and symmetric.         Data / Lab Results:    Hemoglobin A1C   Date Value Ref Range Status   02/08/2021 5.6 % Final   08/08/2020 5.5 3.5 - 5.6 % Final     Lab Results   Component Value Date     (H) 01/25/2021     Lab Results   Component Value Date    LDL 46 01/25/2021    LDL 50 08/08/2020    LDL 50 07/20/2020     Lab Results   Component Value Date    CHOL 114 08/08/2020    CHOL 132  04/05/2019    CHOL 130 10/19/2018     Lab Results   Component Value Date    TRIG 90 01/25/2021    TRIG 144 08/08/2020    TRIG 175 (H) 07/20/2020     Lab Results   Component Value Date    HDL 39 (L) 01/25/2021    HDL 35 (L) 08/08/2020    HDL 39 (L) 07/20/2020     Lab Results   Component Value Date    PSA 0.4 07/20/2020    PSA 0.3 04/05/2019    PSA 0.4 04/03/2018     Lab Results   Component Value Date    WBC 6.4 01/25/2021    HGB 14.1 01/25/2021    HCT 42.3 01/25/2021    MCV 93 01/25/2021     01/25/2021     Lab Results   Component Value Date    TSH 2.530 01/25/2021      Lab Results   Component Value Date    GLUCOSE 111 (H) 08/08/2020    BUN 13 01/25/2021    CREATININE 0.97 01/25/2021    EGFRIFNONA 83 01/25/2021    EGFRIFAFRI 96 01/25/2021    BCR 13 01/25/2021    K 4.9 01/25/2021    CO2 25 01/25/2021    CALCIUM 9.1 01/25/2021    PROTENTOTREF 6.6 01/25/2021    ALBUMIN 4.4 01/25/2021    LABIL2 2.0 01/25/2021    AST 22 01/25/2021    ALT 37 01/25/2021     No results found for: MAT, RF, SEDRATE   No results found for: CRP   No results found for: IRON, TIBC, FERRITIN   Lab Results   Component Value Date    GDITPTZW40 481 04/03/2018        Age-appropriate Screening Schedule:  Refer to the list below for future screening recommendations based on patient's age, sex and/or medical conditions. Orders for these recommended tests are listed in the plan section. The patient has been provided with a written plan.    Health Maintenance   Topic Date Due   • TDAP/TD VACCINES (1 - Tdap) 07/20/1976   • ZOSTER VACCINE (1 of 2) 07/20/2007   • LIPID PANEL  01/25/2022   • COLONOSCOPY  05/16/2029   • INFLUENZA VACCINE  Completed           Assessment/Plan      Medications        Problem List         LOS    Physical.  Doing well, vaccines current.  Baby aspirin daily.  Discussed health maintenance, screening test, lifestyle modification.  Coronary artery disease.  Stable, followed by cardiology Dr. escobedo.  History of repeat stenting  2015.  Continue risk factor reduction.  Remote history of carotid Dopplers, repeat scheduled.  Hypertension.  Good control.  Hyperlipidemia.  Improved on Crestor.  Keto diet okay, Follow-up recheck fasting labs.  Retinal detachment.  December/2019.  Improved status post surgery.  Followed by ophthalmology.  Lumbar disc disease.  Followed by pain management/neurosurgery, has had repeat imagery..  s/p course epidural, consider radiofrequency ablation.  Prostate screening.  Follow-up check PSA.  GERD.  Stable on PPI.  EGD benign/dilation only 2019.  Colon cancer screening.  Colonoscopy benign 2019.  Repeat eval 10 years.  IRENE.  Stable on CPAP.  Rotator cuff tendinitis.    Right.  Much improved today status post injection.  Rehabilitation exercises discussed.  Consider imaging, Ortho referral if persistent symptoms.  Elevated fasting blood sugar.  Discussed diet, exercise lifestyle modification.  Recheck 6 months.  Vocal tic.  Mild, infrequent symptoms currently.  Consider guanfacine.  Tremor.  Intermittent, benign exam today.  TSH normal.  Possibly secondary to benign physiologic tremor.  Follow-up recheck.  Consider further eval if worsening symptoms.      Diagnoses and all orders for this visit:    1. Encounter for annual general medical examination with abnormal findings in adult (Primary)    2. Elevated fasting glucose  -     POCT Glucose  -     POC Glycosylated Hemoglobin (Hb A1C)    3. HTN (hypertension), benign    4. Mixed hyperlipidemia    5. History of tobacco use    6. Class 2 severe obesity due to excess calories with serious comorbidity and body mass index (BMI) of 39.0 to 39.9 in adult (CMS/[facility])    7. Carotid bruit, unspecified laterality  -     US Carotid Bilateral; Future    Other orders  -     Cancel: POCT urinalysis dipstick, manual              Expected course, medications, and adverse effects discussed.  Call or return if worsening or persistent symptoms.  I wore protective equipment throughout this  patient encounter including a mask, gloves, and eye protection.  Hand hygiene was performed before donning protective equipment and after removal when leaving the room. The complete contents of the Assessment and Plan and Data / Lab Results as documented above have been reviewed and addressed by myself with the patient today as part of an ongoing evaluation / treatment plan.  If some of the documentation has been copied from a previous note and is unchanged it indicates that this problem / plan has been assessed today but is stable from a previous visit and no changes have been recommended.

## 2021-02-08 ENCOUNTER — OFFICE VISIT (OUTPATIENT)
Dept: FAMILY MEDICINE CLINIC | Facility: CLINIC | Age: 66
End: 2021-02-08

## 2021-02-08 VITALS
SYSTOLIC BLOOD PRESSURE: 118 MMHG | HEART RATE: 80 BPM | BODY MASS INDEX: 40.87 KG/M2 | OXYGEN SATURATION: 95 % | RESPIRATION RATE: 18 BRPM | TEMPERATURE: 98.6 F | WEIGHT: 260.4 LBS | DIASTOLIC BLOOD PRESSURE: 72 MMHG | HEIGHT: 67 IN

## 2021-02-08 DIAGNOSIS — R09.89 CAROTID BRUIT, UNSPECIFIED LATERALITY: ICD-10-CM

## 2021-02-08 DIAGNOSIS — I10 HTN (HYPERTENSION), BENIGN: Chronic | ICD-10-CM

## 2021-02-08 DIAGNOSIS — E78.2 MIXED HYPERLIPIDEMIA: Chronic | ICD-10-CM

## 2021-02-08 DIAGNOSIS — Z00.01 ENCOUNTER FOR ANNUAL GENERAL MEDICAL EXAMINATION WITH ABNORMAL FINDINGS IN ADULT: Primary | ICD-10-CM

## 2021-02-08 DIAGNOSIS — R73.01 ELEVATED FASTING GLUCOSE: ICD-10-CM

## 2021-02-08 DIAGNOSIS — Z87.891 HISTORY OF TOBACCO USE: ICD-10-CM

## 2021-02-08 DIAGNOSIS — E66.01 CLASS 2 SEVERE OBESITY DUE TO EXCESS CALORIES WITH SERIOUS COMORBIDITY AND BODY MASS INDEX (BMI) OF 39.0 TO 39.9 IN ADULT (HCC): ICD-10-CM

## 2021-02-08 PROBLEM — E78.5 HYPERLIPIDEMIA: Chronic | Status: ACTIVE | Noted: 2019-12-06

## 2021-02-08 PROBLEM — H92.02 LEFT EAR PAIN: Status: RESOLVED | Noted: 2020-07-22 | Resolved: 2021-02-08

## 2021-02-08 PROBLEM — B96.89 ACUTE BACTERIAL SINUSITIS: Status: RESOLVED | Noted: 2020-07-25 | Resolved: 2021-02-08

## 2021-02-08 PROBLEM — J01.90 ACUTE BACTERIAL SINUSITIS: Status: RESOLVED | Noted: 2020-07-25 | Resolved: 2021-02-08

## 2021-02-08 LAB
GLUCOSE BLDC GLUCOMTR-MCNC: 137 MG/DL (ref 70–130)
HBA1C MFR BLD: 5.6 %

## 2021-02-08 PROCEDURE — 82962 GLUCOSE BLOOD TEST: CPT | Performed by: FAMILY MEDICINE

## 2021-02-08 PROCEDURE — 99396 PREV VISIT EST AGE 40-64: CPT | Performed by: FAMILY MEDICINE

## 2021-02-08 PROCEDURE — 99213 OFFICE O/P EST LOW 20 MIN: CPT | Performed by: FAMILY MEDICINE

## 2021-02-08 PROCEDURE — 83036 HEMOGLOBIN GLYCOSYLATED A1C: CPT | Performed by: FAMILY MEDICINE

## 2021-02-10 ENCOUNTER — TELEPHONE (OUTPATIENT)
Dept: FAMILY MEDICINE CLINIC | Facility: CLINIC | Age: 66
End: 2021-02-10

## 2021-02-10 DIAGNOSIS — R25.1 TREMOR: Primary | ICD-10-CM

## 2021-02-10 NOTE — TELEPHONE ENCOUNTER
Lets go ahead and schedule John with a neurologist to evaluate his tremor, if he is okay with that could see Dr. Pollack, thanks

## 2021-06-10 ENCOUNTER — OFFICE VISIT (OUTPATIENT)
Dept: FAMILY MEDICINE CLINIC | Facility: CLINIC | Age: 66
End: 2021-06-10

## 2021-06-10 VITALS
HEIGHT: 67 IN | BODY MASS INDEX: 40.18 KG/M2 | TEMPERATURE: 97.5 F | HEART RATE: 71 BPM | RESPIRATION RATE: 16 BRPM | OXYGEN SATURATION: 96 % | SYSTOLIC BLOOD PRESSURE: 130 MMHG | WEIGHT: 256 LBS | DIASTOLIC BLOOD PRESSURE: 82 MMHG

## 2021-06-10 DIAGNOSIS — H60.502 ACUTE OTITIS EXTERNA OF LEFT EAR, UNSPECIFIED TYPE: Primary | ICD-10-CM

## 2021-06-10 DIAGNOSIS — H65.02 ACUTE SEROUS OTITIS MEDIA OF LEFT EAR, RECURRENCE NOT SPECIFIED: ICD-10-CM

## 2021-06-10 PROBLEM — H65.92 LEFT SEROUS OTITIS MEDIA: Status: ACTIVE | Noted: 2021-06-10

## 2021-06-10 PROCEDURE — 99213 OFFICE O/P EST LOW 20 MIN: CPT | Performed by: FAMILY MEDICINE

## 2021-06-10 NOTE — PROGRESS NOTES
Subjective   John Wilburn is a 65 y.o. male.     Earache   There is pain in the left ear. This is a recurrent problem. The current episode started 1 to 4 weeks ago (2 weeks). The problem has been gradually worsening. There has been no fever. The pain is moderate. Pertinent negatives include no coughing, diarrhea, rash, sore throat or vomiting. He has tried antibiotics for the symptoms. The treatment provided mild relief.      Patient was seen thru teleHealth last week and was treated with Amoxicillin which did not help;        The following portions of the patient's history were reviewed and updated as appropriate: current medications, past family history, past medical history, past social history, past surgical history and problem list.    Family History   Problem Relation Age of Onset   • Hypertension Mother    • Diabetes Mother    • Heart disease Mother    • Heart attack Father    • Rheumatic fever Father    • No Known Problems Sister    • No Known Problems Brother    • No Known Problems Maternal Aunt    • No Known Problems Maternal Uncle    • No Known Problems Paternal Aunt    • No Known Problems Paternal Uncle    • No Known Problems Maternal Grandmother    • No Known Problems Maternal Grandfather    • No Known Problems Paternal Grandmother    • No Known Problems Paternal Grandfather    • No Known Problems Other    • Anemia Neg Hx    • Arrhythmia Neg Hx    • Asthma Neg Hx    • Clotting disorder Neg Hx    • Fainting Neg Hx    • Heart failure Neg Hx    • Hyperlipidemia Neg Hx        Social History     Tobacco Use   • Smoking status: Former Smoker     Packs/day: 1.00     Years: 10.00     Pack years: 10.00     Types: Cigarettes     Start date:      Quit date: 1990     Years since quittin.4   • Smokeless tobacco: Never Used   Vaping Use   • Vaping Use: Never used   Substance Use Topics   • Alcohol use: Yes   • Drug use: No       Past Surgical History:   Procedure Laterality Date   • CATARACT  EXTRACTION, BILATERAL Bilateral    • CORONARY STENT PLACEMENT  2003   • EYE SURGERY  10/16/2019    repair detached retina   • NASAL SEPTUM SURGERY         Patient Active Problem List   Diagnosis   • Acute myocardial infarction, subsequent episode of care (CMS/[facility])   • Acute right-sided low back pain with right-sided sciatica   • Allergic rhinitis   • Coronary angioplasty status   • Coronary atherosclerosis   • Edema   • Encounter for annual general medical examination with abnormal findings in adult   • Colon cancer screening   • Screening PSA (prostate specific antigen)   • GERD (gastroesophageal reflux disease)   • Hepatitis B   • Herpes zoster   • History of tobacco use   • HTN (hypertension), benign   • Hyperlipidemia   • Lumbar disc disease   • Hematoma   • Vertiginous syndrome and labyrinthine disorder   • Myalgia   • Nevus   • Obstructive sleep apnea   • Class 2 severe obesity due to excess calories with serious comorbidity and body mass index (BMI) of 39.0 to 39.9 in adult (CMS/[facility])   • Right hip pain   • Vitamin D deficiency   • Tendinitis of right rotator cuff   • Unstable angina (CMS/[facility])   • Chest pain in adult   • Elevated fasting glucose   • Left serous otitis media       Current Outpatient Medications on File Prior to Visit   Medication Sig Dispense Refill   • aspirin 325 MG tablet Take 1 tablet by mouth Daily.     • carvedilol (COREG) 25 MG tablet TAKE 1 TABLET BY MOUTH TWICE DAILY 180 tablet 2   • Cholecalciferol (VITAMIN D) 50 MCG (2000 UT) tablet Take 1 tablet by mouth Daily.     • esomeprazole (nexIUM) 20 MG capsule Take 20 mg by mouth Every Morning Before Breakfast.     • fluticasone (FLONASE) 50 MCG/ACT nasal spray 2 sprays into the nostril(s) as directed by provider Daily As Needed for Rhinitis or Allergies. 16 g 0   • losartan (Cozaar) 25 MG tablet Take 1 tablet by mouth Daily. 90 tablet 3   • nitroglycerin (NITROLINGUAL) 0.4 MG/SPRAY spray Place 1 spray under the tongue Every 5 (Five)  "Minutes As Needed for Chest Pain. 1 each 1   • rosuvastatin (CRESTOR) 20 MG tablet TAKE 1 TABLET BY MOUTH DAILY 90 tablet 2   • cyclobenzaprine (FLEXERIL) 10 MG tablet TAKE 1 TABLET BY MOUTH AT NIGHT AS NEEDED FOR MUSCLE SPASMS 90 tablet 2     No current facility-administered medications on file prior to visit.       Allergies   Allergen Reactions   • Vicodin [Hydrocodone-Acetaminophen] Hives   • Atorvastatin Hives   • Propoxyphene Hives       Review of Systems   Constitutional: Negative for fatigue and fever.   HENT: Positive for congestion, ear pain and sinus pressure. Negative for sore throat and voice change.    Respiratory: Negative for cough, shortness of breath and wheezing.    Gastrointestinal: Negative for diarrhea, nausea and vomiting.   Musculoskeletal: Negative for myalgias.   Skin: Negative for rash.   Neurological: Negative for headache.       Objective   Visit Vitals  /82 (BP Location: Left arm, Patient Position: Sitting, Cuff Size: Large Adult)   Pulse 71   Temp 97.5 °F (36.4 °C)   Resp 16   Ht 170.2 cm (67\")   Wt 116 kg (256 lb)   SpO2 96%   BMI 40.10 kg/m²     Physical Exam  Vitals and nursing note reviewed.   Constitutional:       Appearance: He is well-developed.   HENT:      Head: Normocephalic.      Right Ear: Tympanic membrane, ear canal and external ear normal. No middle ear effusion. There is no impacted cerumen.      Left Ear: Tympanic membrane and ear canal normal. Tenderness (canal) present.  No middle ear effusion. There is no impacted cerumen.      Nose: No septal deviation, mucosal edema or congestion.      Right Sinus: No maxillary sinus tenderness or frontal sinus tenderness.      Left Sinus: No maxillary sinus tenderness or frontal sinus tenderness.      Mouth/Throat:      Mouth: No oral lesions.      Pharynx: No oropharyngeal exudate.      Tonsils: No tonsillar abscesses.   Neck:      Thyroid: No thyromegaly.      Vascular: No carotid bruit.      Trachea: Trachea normal. "   Cardiovascular:      Rate and Rhythm: Normal rate and regular rhythm.      Heart sounds: No murmur heard.   No friction rub. No gallop.    Pulmonary:      Effort: Pulmonary effort is normal. No respiratory distress.      Breath sounds: Normal breath sounds. No wheezing.   Chest:      Chest wall: No tenderness.   Musculoskeletal:      Cervical back: Neck supple.   Skin:     General: Skin is dry.      Findings: No rash.      Nails: There is no clubbing.   Neurological:      Mental Status: He is alert and oriented to person, place, and time.   Psychiatric:         Behavior: Behavior is cooperative.           Assessment/Plan .  Problem List Items Addressed This Visit        Unprioritized    Left serous otitis media    Current Assessment & Plan     Advised patient to restart Flonase and add Claritin OTC.             Other Visit Diagnoses     Acute otitis externa of left ear, unspecified type    -  Primary    --Advised to use Cortiporin Otic drops.  Benefits of the drug outweigh the risk    Relevant Medications    neomycin-polymyxin-hydrocortisone (CORTISPORIN) 3.5-30616-9 otic solution

## 2021-06-29 ENCOUNTER — OFFICE VISIT (OUTPATIENT)
Dept: FAMILY MEDICINE CLINIC | Facility: CLINIC | Age: 66
End: 2021-06-29

## 2021-06-29 VITALS
HEART RATE: 74 BPM | DIASTOLIC BLOOD PRESSURE: 72 MMHG | OXYGEN SATURATION: 98 % | SYSTOLIC BLOOD PRESSURE: 137 MMHG | WEIGHT: 254.4 LBS | HEIGHT: 67 IN | RESPIRATION RATE: 18 BRPM | BODY MASS INDEX: 39.93 KG/M2 | TEMPERATURE: 97.8 F

## 2021-06-29 DIAGNOSIS — J40 BRONCHITIS: Primary | ICD-10-CM

## 2021-06-29 PROCEDURE — 99213 OFFICE O/P EST LOW 20 MIN: CPT | Performed by: FAMILY MEDICINE

## 2021-06-29 RX ORDER — ALBUTEROL SULFATE 90 UG/1
2 AEROSOL, METERED RESPIRATORY (INHALATION) EVERY 4 HOURS PRN
Qty: 18 G | Refills: 0 | Status: SHIPPED | OUTPATIENT
Start: 2021-06-29 | End: 2021-07-28

## 2021-06-29 RX ORDER — DOXYCYCLINE 100 MG/1
100 CAPSULE ORAL 2 TIMES DAILY
Qty: 20 CAPSULE | Refills: 0 | Status: SHIPPED | OUTPATIENT
Start: 2021-06-29 | End: 2021-07-09

## 2021-06-29 NOTE — PROGRESS NOTES
"Santiago Wilburn is a 65 y.o. male.   Chief Complaint   Patient presents with   • URI       \"several days\" of sough, stuffy nose. No fever, loose cough but not productive. No SOA    URI   This is a new problem. The current episode started in the past 7 days. The problem has been gradually worsening. There has been no fever. Associated symptoms include congestion, coughing, rhinorrhea, sinus pain and sneezing. Pertinent negatives include no abdominal pain, chest pain, diarrhea, dysuria, ear pain, nausea, neck pain, rash, swollen glands, vomiting or wheezing. He has tried nothing for the symptoms. The treatment provided no relief.        The following portions of the patient's history were reviewed and updated as appropriate: allergies, current medications, past family history, past medical history, past social history, past surgical history and problem list.    Patient Active Problem List   Diagnosis   • Acute myocardial infarction, subsequent episode of care (CMS/[facility])   • Acute right-sided low back pain with right-sided sciatica   • Allergic rhinitis   • Coronary angioplasty status   • Coronary atherosclerosis   • Edema   • Encounter for annual general medical examination with abnormal findings in adult   • Colon cancer screening   • Screening PSA (prostate specific antigen)   • GERD (gastroesophageal reflux disease)   • Hepatitis B   • Herpes zoster   • History of tobacco use   • HTN (hypertension), benign   • Hyperlipidemia   • Lumbar disc disease   • Hematoma   • Vertiginous syndrome and labyrinthine disorder   • Myalgia   • Nevus   • Obstructive sleep apnea   • Class 2 severe obesity due to excess calories with serious comorbidity and body mass index (BMI) of 39.0 to 39.9 in adult (CMS/[facility])   • Right hip pain   • Vitamin D deficiency   • Tendinitis of right rotator cuff   • Unstable angina (CMS/[facility])   • Chest pain in adult   • Elevated fasting glucose   • Left serous otitis media       Current " Outpatient Medications on File Prior to Visit   Medication Sig Dispense Refill   • aspirin 325 MG tablet Take 1 tablet by mouth Daily.     • carvedilol (COREG) 25 MG tablet TAKE 1 TABLET BY MOUTH TWICE DAILY 180 tablet 2   • Cholecalciferol (VITAMIN D) 50 MCG (2000 UT) tablet Take 1 tablet by mouth Daily.     • cyclobenzaprine (FLEXERIL) 10 MG tablet TAKE 1 TABLET BY MOUTH AT NIGHT AS NEEDED FOR MUSCLE SPASMS 90 tablet 2   • esomeprazole (nexIUM) 20 MG capsule Take 20 mg by mouth Every Morning Before Breakfast.     • fluticasone (FLONASE) 50 MCG/ACT nasal spray 2 sprays into the nostril(s) as directed by provider Daily As Needed for Rhinitis or Allergies. 16 g 0   • losartan (Cozaar) 25 MG tablet Take 1 tablet by mouth Daily. 90 tablet 3   • neomycin-polymyxin-hydrocortisone (CORTISPORIN) 3.5-30749-5 otic solution Administer 4 drops into the left ear 4 (Four) Times a Day. 10 mL 1   • nitroglycerin (NITROLINGUAL) 0.4 MG/SPRAY spray Place 1 spray under the tongue Every 5 (Five) Minutes As Needed for Chest Pain. 1 each 1   • rosuvastatin (CRESTOR) 20 MG tablet TAKE 1 TABLET BY MOUTH DAILY 90 tablet 2     No current facility-administered medications on file prior to visit.     Current outpatient and discharge medications have been reconciled for the patient.  Reviewed by: Bhargav Martin MD      Allergies   Allergen Reactions   • Vicodin [Hydrocodone-Acetaminophen] Hives   • Atorvastatin Hives   • Propoxyphene Hives       Review of Systems   Constitutional: Negative for activity change, appetite change, fatigue and fever.   HENT: Positive for congestion, rhinorrhea and sneezing. Negative for ear pain, swollen glands and voice change.    Eyes: Negative for visual disturbance.   Respiratory: Positive for cough. Negative for shortness of breath and wheezing.    Cardiovascular: Negative for chest pain and leg swelling.   Gastrointestinal: Negative for abdominal pain, blood in stool, constipation, diarrhea, nausea and  "vomiting.   Endocrine: Negative for polydipsia and polyuria.   Genitourinary: Negative for dysuria, frequency and hematuria.   Musculoskeletal: Negative for joint swelling, neck pain and neck stiffness.   Skin: Negative for rash and bruise.   Neurological: Negative for weakness, numbness and headache.   Psychiatric/Behavioral: Negative for suicidal ideas and depressed mood.     I have reviewed and confirmed the accuracy of the ROS as documented by the MA/LPN/RN Bhargav Martin MD    Objective   Visit Vitals  /72 (BP Location: Right arm, Patient Position: Sitting, Cuff Size: Adult)   Pulse 74   Temp 97.8 °F (36.6 °C)   Resp 18   Ht 170.2 cm (67\")   Wt 115 kg (254 lb 6.4 oz)   SpO2 98%   BMI 39.84 kg/m²       Physical Exam  Constitutional:       Appearance: He is well-developed.   HENT:      Head: Normocephalic and atraumatic.      Right Ear: External ear normal.      Left Ear: External ear normal.      Nose: Nose normal.   Eyes:      Pupils: Pupils are equal, round, and reactive to light.   Cardiovascular:      Rate and Rhythm: Normal rate and regular rhythm.      Heart sounds: Normal heart sounds.   Pulmonary:      Effort: Pulmonary effort is normal.      Breath sounds: Normal breath sounds.   Abdominal:      General: Bowel sounds are normal.      Palpations: Abdomen is soft.   Musculoskeletal:         General: Normal range of motion.      Cervical back: Normal range of motion and neck supple.   Skin:     General: Skin is warm and dry.   Neurological:      Mental Status: He is alert and oriented to person, place, and time.   Psychiatric:         Behavior: Behavior normal.         Thought Content: Thought content normal.         Judgment: Judgment normal.         Assessment/Plan .    Diagnoses and all orders for this visit:    1. Bronchitis (Primary)  -     doxycycline (MONODOX) 100 MG capsule; Take 1 capsule by mouth 2 (Two) Times a Day for 10 days.  Dispense: 20 capsule; Refill: 0  -     albuterol " sulfate  (90 Base) MCG/ACT inhaler; Inhale 2 puffs Every 4 (Four) Hours As Needed for Wheezing.  Dispense: 18 g; Refill: 0  -     guaiFENesin (Mucinex) 600 MG 12 hr tablet; Take 2 tablets by mouth 2 (Two) Times a Day for 10 days.  Dispense: 40 tablet; Refill: 0    Findings discussed. All questions answered.  Medication and medication adverse effects discussed.  Drug education given and explained to patient. Patient verbalized understanding.  Follow-up in 1 week if not better.  Follow-up sooner for worsening symptoms or for any concerns.         I wore protective equipment throughout this patient encounter to include mask and gloves. Hand hygiene was performed before donning protective equipment and after removal when leaving the room

## 2021-07-02 DIAGNOSIS — R07.9 CHEST PAIN, UNSPECIFIED TYPE: ICD-10-CM

## 2021-07-02 RX ORDER — NITROGLYCERIN 400 UG/1
1 SPRAY ORAL
Qty: 1 EACH | Refills: 1 | Status: SHIPPED | OUTPATIENT
Start: 2021-07-02 | End: 2023-01-19 | Stop reason: SDUPTHER

## 2021-07-28 ENCOUNTER — HOSPITAL ENCOUNTER (OUTPATIENT)
Facility: HOSPITAL | Age: 66
Setting detail: OBSERVATION
Discharge: HOME | End: 2021-07-29
Attending: EMERGENCY MEDICINE | Admitting: HOSPITALIST

## 2021-07-28 ENCOUNTER — TELEPHONE (OUTPATIENT)
Dept: CARDIOLOGY | Facility: CLINIC | Age: 66
End: 2021-07-28

## 2021-07-28 ENCOUNTER — APPOINTMENT (OUTPATIENT)
Dept: GENERAL RADIOLOGY | Facility: HOSPITAL | Age: 66
End: 2021-07-28

## 2021-07-28 DIAGNOSIS — R07.9 CHEST PAIN, UNSPECIFIED TYPE: Primary | ICD-10-CM

## 2021-07-28 PROBLEM — E66.01 CLASS 2 SEVERE OBESITY DUE TO EXCESS CALORIES WITH SERIOUS COMORBIDITY AND BODY MASS INDEX (BMI) OF 39.0 TO 39.9 IN ADULT (HCC): Chronic | Status: ACTIVE | Noted: 2019-12-06

## 2021-07-28 PROBLEM — K21.9 GERD (GASTROESOPHAGEAL REFLUX DISEASE): Chronic | Status: ACTIVE | Noted: 2019-12-06

## 2021-07-28 PROBLEM — G47.33 OBSTRUCTIVE SLEEP APNEA: Chronic | Status: ACTIVE | Noted: 2019-12-06

## 2021-07-28 PROBLEM — E66.812 CLASS 2 SEVERE OBESITY DUE TO EXCESS CALORIES WITH SERIOUS COMORBIDITY AND BODY MASS INDEX (BMI) OF 39.0 TO 39.9 IN ADULT: Chronic | Status: ACTIVE | Noted: 2019-12-06

## 2021-07-28 PROBLEM — E55.9 VITAMIN D DEFICIENCY: Chronic | Status: ACTIVE | Noted: 2019-12-06

## 2021-07-28 LAB
ANION GAP SERPL CALCULATED.3IONS-SCNC: 9 MMOL/L (ref 5–15)
APTT PPP: 26.8 SECONDS (ref 24–31)
BASOPHILS # BLD AUTO: 0.1 10*3/MM3 (ref 0–0.2)
BASOPHILS NFR BLD AUTO: 1.1 % (ref 0–1.5)
BUN SERPL-MCNC: 14 MG/DL (ref 8–23)
BUN/CREAT SERPL: 13.7 (ref 7–25)
CALCIUM SPEC-SCNC: 9.4 MG/DL (ref 8.6–10.5)
CHLORIDE SERPL-SCNC: 101 MMOL/L (ref 98–107)
CO2 SERPL-SCNC: 29 MMOL/L (ref 22–29)
CREAT SERPL-MCNC: 1.02 MG/DL (ref 0.76–1.27)
DEPRECATED RDW RBC AUTO: 41.6 FL (ref 37–54)
EOSINOPHIL # BLD AUTO: 0.1 10*3/MM3 (ref 0–0.4)
EOSINOPHIL NFR BLD AUTO: 2.2 % (ref 0.3–6.2)
ERYTHROCYTE [DISTWIDTH] IN BLOOD BY AUTOMATED COUNT: 13.7 % (ref 12.3–15.4)
GFR SERPL CREATININE-BSD FRML MDRD: 74 ML/MIN/1.73
GLUCOSE SERPL-MCNC: 99 MG/DL (ref 65–99)
HCT VFR BLD AUTO: 43.5 % (ref 37.5–51)
HGB BLD-MCNC: 14.8 G/DL (ref 13–17.7)
HOLD SPECIMEN: NORMAL
INR PPP: 0.99 (ref 0.93–1.1)
LYMPHOCYTES # BLD AUTO: 2.3 10*3/MM3 (ref 0.7–3.1)
LYMPHOCYTES NFR BLD AUTO: 35.6 % (ref 19.6–45.3)
MCH RBC QN AUTO: 29.4 PG (ref 26.6–33)
MCHC RBC AUTO-ENTMCNC: 34 G/DL (ref 31.5–35.7)
MCV RBC AUTO: 86.3 FL (ref 79–97)
MONOCYTES # BLD AUTO: 0.6 10*3/MM3 (ref 0.1–0.9)
MONOCYTES NFR BLD AUTO: 9.8 % (ref 5–12)
NEUTROPHILS NFR BLD AUTO: 3.3 10*3/MM3 (ref 1.7–7)
NEUTROPHILS NFR BLD AUTO: 51.3 % (ref 42.7–76)
NRBC BLD AUTO-RTO: 0.1 /100 WBC (ref 0–0.2)
PLATELET # BLD AUTO: 199 10*3/MM3 (ref 140–450)
PMV BLD AUTO: 7.6 FL (ref 6–12)
POTASSIUM SERPL-SCNC: 4.6 MMOL/L (ref 3.5–5.2)
PROTHROMBIN TIME: 11 SECONDS (ref 9.6–11.7)
RBC # BLD AUTO: 5.04 10*6/MM3 (ref 4.14–5.8)
SARS-COV-2 RNA PNL SPEC NAA+PROBE: NOT DETECTED
SODIUM SERPL-SCNC: 139 MMOL/L (ref 136–145)
TROPONIN T SERPL-MCNC: <0.01 NG/ML (ref 0–0.03)
WBC # BLD AUTO: 6.4 10*3/MM3 (ref 3.4–10.8)

## 2021-07-28 PROCEDURE — 80048 BASIC METABOLIC PNL TOTAL CA: CPT | Performed by: EMERGENCY MEDICINE

## 2021-07-28 PROCEDURE — G0378 HOSPITAL OBSERVATION PER HR: HCPCS

## 2021-07-28 PROCEDURE — 85610 PROTHROMBIN TIME: CPT | Performed by: EMERGENCY MEDICINE

## 2021-07-28 PROCEDURE — 93005 ELECTROCARDIOGRAM TRACING: CPT

## 2021-07-28 PROCEDURE — 99284 EMERGENCY DEPT VISIT MOD MDM: CPT

## 2021-07-28 PROCEDURE — 85025 COMPLETE CBC W/AUTO DIFF WBC: CPT | Performed by: EMERGENCY MEDICINE

## 2021-07-28 PROCEDURE — 71045 X-RAY EXAM CHEST 1 VIEW: CPT

## 2021-07-28 PROCEDURE — 87635 SARS-COV-2 COVID-19 AMP PRB: CPT | Performed by: EMERGENCY MEDICINE

## 2021-07-28 PROCEDURE — C9803 HOPD COVID-19 SPEC COLLECT: HCPCS

## 2021-07-28 PROCEDURE — 36415 COLL VENOUS BLD VENIPUNCTURE: CPT | Performed by: NURSE PRACTITIONER

## 2021-07-28 PROCEDURE — 93005 ELECTROCARDIOGRAM TRACING: CPT | Performed by: EMERGENCY MEDICINE

## 2021-07-28 PROCEDURE — 85730 THROMBOPLASTIN TIME PARTIAL: CPT | Performed by: EMERGENCY MEDICINE

## 2021-07-28 PROCEDURE — 84484 ASSAY OF TROPONIN QUANT: CPT | Performed by: EMERGENCY MEDICINE

## 2021-07-28 PROCEDURE — 99219 PR INITIAL OBSERVATION CARE/DAY 50 MINUTES: CPT | Performed by: HOSPITALIST

## 2021-07-28 RX ORDER — ACETAMINOPHEN 650 MG/1
650 SUPPOSITORY RECTAL EVERY 4 HOURS PRN
Status: DISCONTINUED | OUTPATIENT
Start: 2021-07-28 | End: 2021-07-29 | Stop reason: HOSPADM

## 2021-07-28 RX ORDER — MAGNESIUM SULFATE HEPTAHYDRATE 40 MG/ML
2 INJECTION, SOLUTION INTRAVENOUS AS NEEDED
Status: DISCONTINUED | OUTPATIENT
Start: 2021-07-28 | End: 2021-07-29 | Stop reason: HOSPADM

## 2021-07-28 RX ORDER — ONDANSETRON 4 MG/1
4 TABLET, FILM COATED ORAL EVERY 6 HOURS PRN
Status: DISCONTINUED | OUTPATIENT
Start: 2021-07-28 | End: 2021-07-29 | Stop reason: HOSPADM

## 2021-07-28 RX ORDER — NITROGLYCERIN 0.4 MG/1
0.4 TABLET SUBLINGUAL
Status: DISCONTINUED | OUTPATIENT
Start: 2021-07-28 | End: 2021-07-29 | Stop reason: HOSPADM

## 2021-07-28 RX ORDER — CYCLOBENZAPRINE HCL 10 MG
10 TABLET ORAL NIGHTLY PRN
Status: DISCONTINUED | OUTPATIENT
Start: 2021-07-28 | End: 2021-07-29 | Stop reason: HOSPADM

## 2021-07-28 RX ORDER — KETOROLAC TROMETHAMINE 15 MG/ML
15 INJECTION, SOLUTION INTRAMUSCULAR; INTRAVENOUS EVERY 6 HOURS PRN
Status: DISCONTINUED | OUTPATIENT
Start: 2021-07-28 | End: 2021-07-29 | Stop reason: HOSPADM

## 2021-07-28 RX ORDER — POTASSIUM CHLORIDE 20 MEQ/1
40 TABLET, EXTENDED RELEASE ORAL AS NEEDED
Status: DISCONTINUED | OUTPATIENT
Start: 2021-07-28 | End: 2021-07-29 | Stop reason: HOSPADM

## 2021-07-28 RX ORDER — ALUMINA, MAGNESIA, AND SIMETHICONE 2400; 2400; 240 MG/30ML; MG/30ML; MG/30ML
15 SUSPENSION ORAL EVERY 6 HOURS PRN
Status: DISCONTINUED | OUTPATIENT
Start: 2021-07-28 | End: 2021-07-29 | Stop reason: HOSPADM

## 2021-07-28 RX ORDER — LOSARTAN POTASSIUM 25 MG/1
25 TABLET ORAL DAILY
Status: DISCONTINUED | OUTPATIENT
Start: 2021-07-29 | End: 2021-07-29 | Stop reason: HOSPADM

## 2021-07-28 RX ORDER — ONDANSETRON 2 MG/ML
4 INJECTION INTRAMUSCULAR; INTRAVENOUS EVERY 6 HOURS PRN
Status: DISCONTINUED | OUTPATIENT
Start: 2021-07-28 | End: 2021-07-29 | Stop reason: HOSPADM

## 2021-07-28 RX ORDER — ROSUVASTATIN CALCIUM 10 MG/1
20 TABLET, COATED ORAL DAILY
Status: DISCONTINUED | OUTPATIENT
Start: 2021-07-29 | End: 2021-07-29 | Stop reason: HOSPADM

## 2021-07-28 RX ORDER — CARVEDILOL 25 MG/1
25 TABLET ORAL 2 TIMES DAILY
Status: DISCONTINUED | OUTPATIENT
Start: 2021-07-28 | End: 2021-07-29 | Stop reason: HOSPADM

## 2021-07-28 RX ORDER — CHOLECALCIFEROL (VITAMIN D3) 125 MCG
5 CAPSULE ORAL NIGHTLY PRN
Status: DISCONTINUED | OUTPATIENT
Start: 2021-07-28 | End: 2021-07-29 | Stop reason: HOSPADM

## 2021-07-28 RX ORDER — ACETAMINOPHEN 325 MG/1
650 TABLET ORAL EVERY 4 HOURS PRN
Status: DISCONTINUED | OUTPATIENT
Start: 2021-07-28 | End: 2021-07-29 | Stop reason: HOSPADM

## 2021-07-28 RX ORDER — MAGNESIUM SULFATE 1 G/100ML
1 INJECTION INTRAVENOUS AS NEEDED
Status: DISCONTINUED | OUTPATIENT
Start: 2021-07-28 | End: 2021-07-29 | Stop reason: HOSPADM

## 2021-07-28 RX ORDER — SODIUM CHLORIDE 0.9 % (FLUSH) 0.9 %
10 SYRINGE (ML) INJECTION AS NEEDED
Status: DISCONTINUED | OUTPATIENT
Start: 2021-07-28 | End: 2021-07-29 | Stop reason: HOSPADM

## 2021-07-28 RX ORDER — ACETAMINOPHEN 160 MG/5ML
650 SOLUTION ORAL EVERY 4 HOURS PRN
Status: DISCONTINUED | OUTPATIENT
Start: 2021-07-28 | End: 2021-07-29 | Stop reason: HOSPADM

## 2021-07-28 RX ORDER — ASPIRIN 325 MG
325 TABLET ORAL DAILY
Status: DISCONTINUED | OUTPATIENT
Start: 2021-07-29 | End: 2021-07-29 | Stop reason: HOSPADM

## 2021-07-28 RX ORDER — PANTOPRAZOLE SODIUM 40 MG/1
40 TABLET, DELAYED RELEASE ORAL EVERY MORNING
Status: DISCONTINUED | OUTPATIENT
Start: 2021-07-29 | End: 2021-07-29 | Stop reason: HOSPADM

## 2021-07-28 RX ORDER — MELATONIN
2000 DAILY
Status: DISCONTINUED | OUTPATIENT
Start: 2021-07-28 | End: 2021-07-29 | Stop reason: HOSPADM

## 2021-07-28 RX ORDER — SODIUM CHLORIDE 0.9 % (FLUSH) 0.9 %
10 SYRINGE (ML) INJECTION EVERY 12 HOURS SCHEDULED
Status: DISCONTINUED | OUTPATIENT
Start: 2021-07-28 | End: 2021-07-29 | Stop reason: HOSPADM

## 2021-07-28 RX ADMIN — Medication 10 ML: at 20:22

## 2021-07-28 RX ADMIN — CARVEDILOL 25 MG: 25 TABLET, FILM COATED ORAL at 20:22

## 2021-07-28 NOTE — TELEPHONE ENCOUNTER
Called pt Left arm pain,  Jaw pain,   Cp       Pt is going to [facility] ER  fyi

## 2021-07-29 ENCOUNTER — READMISSION MANAGEMENT (OUTPATIENT)
Dept: CALL CENTER | Facility: HOSPITAL | Age: 66
End: 2021-07-29

## 2021-07-29 ENCOUNTER — APPOINTMENT (OUTPATIENT)
Dept: NUCLEAR MEDICINE | Facility: HOSPITAL | Age: 66
End: 2021-07-29

## 2021-07-29 VITALS
TEMPERATURE: 97.2 F | WEIGHT: 252.21 LBS | SYSTOLIC BLOOD PRESSURE: 162 MMHG | BODY MASS INDEX: 39.58 KG/M2 | OXYGEN SATURATION: 95 % | HEART RATE: 62 BPM | DIASTOLIC BLOOD PRESSURE: 74 MMHG | HEIGHT: 67 IN | RESPIRATION RATE: 18 BRPM

## 2021-07-29 PROBLEM — R07.9 CHEST PAIN: Status: RESOLVED | Noted: 2021-07-28 | Resolved: 2021-07-29

## 2021-07-29 LAB
ANION GAP SERPL CALCULATED.3IONS-SCNC: 11 MMOL/L (ref 5–15)
BASOPHILS # BLD AUTO: 0.1 10*3/MM3 (ref 0–0.2)
BASOPHILS NFR BLD AUTO: 0.9 % (ref 0–1.5)
BH CV REST NUCLEAR ISOTOPE DOSE: 7.8 MCI
BH CV STRESS BP STAGE 1: NORMAL
BH CV STRESS BP STAGE 2: NORMAL
BH CV STRESS BP STAGE 3: NORMAL
BH CV STRESS BP STAGE 4: NORMAL
BH CV STRESS COMMENTS STAGE 1: NORMAL
BH CV STRESS DOSE REGADENOSON STAGE 1: 0.4
BH CV STRESS DURATION MIN STAGE 1: 1
BH CV STRESS DURATION MIN STAGE 2: 1
BH CV STRESS DURATION MIN STAGE 3: 1
BH CV STRESS DURATION MIN STAGE 4: 1
BH CV STRESS DURATION SEC STAGE 2: 0
BH CV STRESS HR STAGE 1: 105
BH CV STRESS HR STAGE 2: 93
BH CV STRESS HR STAGE 3: 88
BH CV STRESS HR STAGE 4: 89
BH CV STRESS NUCLEAR ISOTOPE DOSE: 21.2 MCI
BH CV STRESS PROTOCOL 1: NORMAL
BH CV STRESS RECOVERY BP: NORMAL MMHG
BH CV STRESS RECOVERY HR: 82 BPM
BH CV STRESS STAGE 1: 1
BH CV STRESS STAGE 2: 2
BH CV STRESS STAGE 3: 3
BH CV STRESS STAGE 4: 4
BUN SERPL-MCNC: 14 MG/DL (ref 8–23)
BUN/CREAT SERPL: 12.1 (ref 7–25)
CALCIUM SPEC-SCNC: 8.9 MG/DL (ref 8.6–10.5)
CHLORIDE SERPL-SCNC: 101 MMOL/L (ref 98–107)
CO2 SERPL-SCNC: 28 MMOL/L (ref 22–29)
CREAT SERPL-MCNC: 1.16 MG/DL (ref 0.76–1.27)
DEPRECATED RDW RBC AUTO: 42.4 FL (ref 37–54)
EOSINOPHIL # BLD AUTO: 0.2 10*3/MM3 (ref 0–0.4)
EOSINOPHIL NFR BLD AUTO: 2.9 % (ref 0.3–6.2)
ERYTHROCYTE [DISTWIDTH] IN BLOOD BY AUTOMATED COUNT: 14 % (ref 12.3–15.4)
GFR SERPL CREATININE-BSD FRML MDRD: 63 ML/MIN/1.73
GLUCOSE SERPL-MCNC: 93 MG/DL (ref 65–99)
HCT VFR BLD AUTO: 41.1 % (ref 37.5–51)
HGB BLD-MCNC: 14 G/DL (ref 13–17.7)
LYMPHOCYTES # BLD AUTO: 2.8 10*3/MM3 (ref 0.7–3.1)
LYMPHOCYTES NFR BLD AUTO: 41 % (ref 19.6–45.3)
MAGNESIUM SERPL-MCNC: 1.9 MG/DL (ref 1.6–2.4)
MAXIMAL PREDICTED HEART RATE: 156 BPM
MCH RBC QN AUTO: 29.5 PG (ref 26.6–33)
MCHC RBC AUTO-ENTMCNC: 34.1 G/DL (ref 31.5–35.7)
MCV RBC AUTO: 86.5 FL (ref 79–97)
MONOCYTES # BLD AUTO: 0.8 10*3/MM3 (ref 0.1–0.9)
MONOCYTES NFR BLD AUTO: 11.6 % (ref 5–12)
NEUTROPHILS NFR BLD AUTO: 3 10*3/MM3 (ref 1.7–7)
NEUTROPHILS NFR BLD AUTO: 43.6 % (ref 42.7–76)
NRBC BLD AUTO-RTO: 0.2 /100 WBC (ref 0–0.2)
PERCENT MAX PREDICTED HR: 67.31 %
PLATELET # BLD AUTO: 182 10*3/MM3 (ref 140–450)
PMV BLD AUTO: 7.8 FL (ref 6–12)
POTASSIUM SERPL-SCNC: 4.1 MMOL/L (ref 3.5–5.2)
QT INTERVAL: 399 MS
RBC # BLD AUTO: 4.75 10*6/MM3 (ref 4.14–5.8)
SODIUM SERPL-SCNC: 140 MMOL/L (ref 136–145)
STRESS BASELINE BP: NORMAL MMHG
STRESS BASELINE HR: 69 BPM
STRESS PERCENT HR: 79 %
STRESS POST PEAK BP: NORMAL MMHG
STRESS POST PEAK HR: 105 BPM
STRESS TARGET HR: 133 BPM
TROPONIN T SERPL-MCNC: <0.01 NG/ML (ref 0–0.03)
TROPONIN T SERPL-MCNC: <0.01 NG/ML (ref 0–0.03)
WBC # BLD AUTO: 6.9 10*3/MM3 (ref 3.4–10.8)

## 2021-07-29 PROCEDURE — 85025 COMPLETE CBC W/AUTO DIFF WBC: CPT | Performed by: NURSE PRACTITIONER

## 2021-07-29 PROCEDURE — 93017 CV STRESS TEST TRACING ONLY: CPT

## 2021-07-29 PROCEDURE — 78452 HT MUSCLE IMAGE SPECT MULT: CPT

## 2021-07-29 PROCEDURE — 99225 PR SBSQ OBSERVATION CARE/DAY 25 MINUTES: CPT | Performed by: HOSPITALIST

## 2021-07-29 PROCEDURE — 80048 BASIC METABOLIC PNL TOTAL CA: CPT | Performed by: NURSE PRACTITIONER

## 2021-07-29 PROCEDURE — 83735 ASSAY OF MAGNESIUM: CPT | Performed by: NURSE PRACTITIONER

## 2021-07-29 PROCEDURE — 93016 CV STRESS TEST SUPVJ ONLY: CPT | Performed by: INTERNAL MEDICINE

## 2021-07-29 PROCEDURE — G0378 HOSPITAL OBSERVATION PER HR: HCPCS

## 2021-07-29 PROCEDURE — 84484 ASSAY OF TROPONIN QUANT: CPT | Performed by: NURSE PRACTITIONER

## 2021-07-29 PROCEDURE — 78452 HT MUSCLE IMAGE SPECT MULT: CPT | Performed by: INTERNAL MEDICINE

## 2021-07-29 PROCEDURE — 99244 OFF/OP CNSLTJ NEW/EST MOD 40: CPT | Performed by: INTERNAL MEDICINE

## 2021-07-29 PROCEDURE — A9500 TC99M SESTAMIBI: HCPCS | Performed by: HOSPITALIST

## 2021-07-29 PROCEDURE — 0 TECHNETIUM SESTAMIBI: Performed by: HOSPITALIST

## 2021-07-29 PROCEDURE — 93018 CV STRESS TEST I&R ONLY: CPT | Performed by: INTERNAL MEDICINE

## 2021-07-29 RX ADMIN — PANTOPRAZOLE SODIUM 40 MG: 40 TABLET, DELAYED RELEASE ORAL at 06:01

## 2021-07-29 RX ADMIN — CARVEDILOL 25 MG: 25 TABLET, FILM COATED ORAL at 20:13

## 2021-07-29 RX ADMIN — TECHNETIUM TC 99M SESTAMIBI 1 DOSE: 1 INJECTION INTRAVENOUS at 11:19

## 2021-07-29 RX ADMIN — ASPIRIN 325 MG ORAL TABLET 325 MG: 325 PILL ORAL at 08:10

## 2021-07-29 RX ADMIN — Medication 10 ML: at 09:25

## 2021-07-30 ENCOUNTER — TRANSITIONAL CARE MANAGEMENT TELEPHONE ENCOUNTER (OUTPATIENT)
Dept: CALL CENTER | Facility: HOSPITAL | Age: 66
End: 2021-07-30

## 2021-07-30 DIAGNOSIS — I10 HYPERTENSION, UNSPECIFIED TYPE: ICD-10-CM

## 2021-07-30 DIAGNOSIS — E78.2 MIXED HYPERLIPIDEMIA: ICD-10-CM

## 2021-07-30 RX ORDER — CARVEDILOL 25 MG/1
25 TABLET ORAL 2 TIMES DAILY
Qty: 180 TABLET | Refills: 2 | Status: SHIPPED | OUTPATIENT
Start: 2021-07-30 | End: 2022-06-10

## 2021-07-30 NOTE — OUTREACH NOTE
Call Center TCM Note      Responses   [facility] discharged from?  [facility]   Does the patient have one of the following disease processes/diagnoses(primary or secondary)?  Other   TCM attempt successful?  No   Unsuccessful attempts  Attempt 2          Keven Hernandez RN    7/30/2021, 17:03 EDT

## 2021-07-30 NOTE — OUTREACH NOTE
Call Center TCM Note      Responses   [facility] patient discharged from?  [facility]   Does the patient have one of the following disease processes/diagnoses(primary or secondary)?  Other   TCM attempt successful?  No   Unsuccessful attempts  Attempt 1          Keven Hernandez RN    7/30/2021, 16:13 EDT

## 2021-07-30 NOTE — OUTREACH NOTE
Prep Survey      Responses   [facility]?  [facility]   Is LACE score < 7 ?  Yes   Emergency Room discharge w/ pulse ox?  No   Eligibility  [facility]   Date of Admission  07/28/21   Date of Discharge  07/29/21   Discharge Disposition  Home or Self Care   Discharge diagnosis  Chest painGERD    Does the patient have one of the following disease processes/diagnoses(primary or secondary)?  Other   Does the patient have Home health ordered?  No   Is there a DME ordered?  No   Prep survey completed?  Yes          Bianka Simental RN

## 2021-08-02 ENCOUNTER — TRANSITIONAL CARE MANAGEMENT TELEPHONE ENCOUNTER (OUTPATIENT)
Dept: CALL CENTER | Facility: HOSPITAL | Age: 66
End: 2021-08-02

## 2021-08-02 NOTE — OUTREACH NOTE
Call Center TCM Note      Responses   [facility] [place] patient discharged from?  [facility]   Does the patient have one of the following disease processes/diagnoses(primary or secondary)?  Other   TCM attempt successful?  Yes   Discharge diagnosis  Chest painGERD    Prescription comments  No changes made to pt regular medications   Does the patient have a primary care provider?   Yes   Does the patient have an appointment with their PCP within 7 days of discharge?  Greater than 7 days   Comments regarding PCP  Pt will see PCP on 08/09/2021   What is preventing the patient from scheduling follow up appointments within 7 days of discharge?  Earlier appointment not available   Has the patient kept scheduled appointments due by today?  N/A   Has home health visited the patient within 72 hours of discharge?  N/A   Psychosocial issues?  No   Did the patient receive a copy of their discharge instructions?  Yes   Nursing interventions  Reviewed instructions with patient   What is the patient's perception of their health status since discharge?  Improving   Is the patient/caregiver able to teach back signs and symptoms related to disease process for when to call PCP?  Yes   Is the patient/caregiver able to teach back signs and symptoms related to disease process for when to call 911?  Yes   Is the patient/caregiver able to teach back the hierarchy of who to call/visit for symptoms/problems? PCP, Specialist, Home health nurse, Urgent Care, ED, 911  Yes   If the patient is a current smoker, are they able to teach back resources for cessation?  Not a smoker   TCM call completed?  Yes   Wrap up additional comments  Pt feeling fine. no arm or chest pain, no SOB, nausea. No med changes made. No questions Pt will see PCP on 08/09/2021          Bianka Hughes MA    8/2/2021, 16:34 EDT

## 2021-08-03 RX ORDER — ROSUVASTATIN CALCIUM 20 MG/1
20 TABLET, COATED ORAL DAILY
Qty: 90 TABLET | Refills: 2 | Status: SHIPPED | OUTPATIENT
Start: 2021-08-03 | End: 2022-05-03

## 2021-08-09 ENCOUNTER — OFFICE VISIT (OUTPATIENT)
Dept: FAMILY MEDICINE CLINIC | Facility: CLINIC | Age: 66
End: 2021-08-09

## 2021-08-09 VITALS
HEART RATE: 82 BPM | HEIGHT: 67 IN | RESPIRATION RATE: 18 BRPM | DIASTOLIC BLOOD PRESSURE: 80 MMHG | WEIGHT: 253 LBS | BODY MASS INDEX: 39.71 KG/M2 | TEMPERATURE: 97.3 F | OXYGEN SATURATION: 98 % | SYSTOLIC BLOOD PRESSURE: 120 MMHG

## 2021-08-09 DIAGNOSIS — I10 HTN (HYPERTENSION), BENIGN: Chronic | ICD-10-CM

## 2021-08-09 DIAGNOSIS — K21.9 GASTROESOPHAGEAL REFLUX DISEASE WITHOUT ESOPHAGITIS: Chronic | ICD-10-CM

## 2021-08-09 DIAGNOSIS — Z87.891 HISTORY OF TOBACCO USE: ICD-10-CM

## 2021-08-09 DIAGNOSIS — Z09 HOSPITAL DISCHARGE FOLLOW-UP: Primary | ICD-10-CM

## 2021-08-09 DIAGNOSIS — E78.2 MIXED HYPERLIPIDEMIA: Chronic | ICD-10-CM

## 2021-08-09 DIAGNOSIS — E66.01 CLASS 2 SEVERE OBESITY DUE TO EXCESS CALORIES WITH SERIOUS COMORBIDITY AND BODY MASS INDEX (BMI) OF 39.0 TO 39.9 IN ADULT (HCC): Chronic | ICD-10-CM

## 2021-08-09 DIAGNOSIS — G47.33 OBSTRUCTIVE SLEEP APNEA: ICD-10-CM

## 2021-08-09 DIAGNOSIS — I25.10 CORONARY ARTERY DISEASE INVOLVING NATIVE CORONARY ARTERY OF NATIVE HEART WITHOUT ANGINA PECTORIS: ICD-10-CM

## 2021-08-09 DIAGNOSIS — R73.01 ELEVATED FASTING GLUCOSE: ICD-10-CM

## 2021-08-09 LAB
GLUCOSE BLDC GLUCOMTR-MCNC: 132 MG/DL (ref 70–130)
HBA1C MFR BLD: 5.8 %

## 2021-08-09 PROCEDURE — 83036 HEMOGLOBIN GLYCOSYLATED A1C: CPT | Performed by: FAMILY MEDICINE

## 2021-08-09 PROCEDURE — 3044F HG A1C LEVEL LT 7.0%: CPT | Performed by: FAMILY MEDICINE

## 2021-08-09 PROCEDURE — 99214 OFFICE O/P EST MOD 30 MIN: CPT | Performed by: FAMILY MEDICINE

## 2021-08-09 NOTE — PROGRESS NOTES
Subjective   John Wilburn is a 66 y.o. male.     Chief Complaint   Patient presents with   • Hospital Follow Up Visit     [facility] 7/28/21 through 7/29/21   • Blood Sugar Problem   • Hypertension   • Hyperlipidemia       John was seen at [facility] . He was admitted on 7/28/2021  for Left arm pain. He was discharged on 7/29/21. Discharge diagnosis was coronary atherosclerosis. Labs that were performed during the encounter included: CMP-normal, CBC-normal and INR-0.99. Diagnostic studies that were performed included: Chest x-ray-No active disease and Transthoracic echo-Lexiscan Cardiolite with normal perfusion, negative for ischemia. Normal wall motion.  LVEF of 70%. Currently John receives care at home. Complications from the hospital stay include none. The patient stated that they do not need help with their daily life and activities. The patient stated that they do have emotional support at home.    Blood Sugar Problem  This is a recurrent problem. The current episode started more than 1 year ago. The problem has been gradually worsening. Pertinent negatives include no abdominal pain, chest pain, chills, coughing, diaphoresis, fatigue, headaches, myalgias, nausea, neck pain or vomiting. Nothing aggravates the symptoms. He has tried nothing for the symptoms.   Hyperlipidemia  This is a chronic problem. The current episode started more than 1 year ago. Exacerbating diseases include obesity. Pertinent negatives include no chest pain, focal weakness, leg pain, myalgias or shortness of breath. Current antihyperlipidemic treatment includes statins. The current treatment provides mild improvement of lipids. There are no compliance problems.  Risk factors for coronary artery disease include dyslipidemia, hypertension, male sex and obesity.   Hypertension  This is a chronic problem. The current episode started more than 1 year ago. The problem is unchanged. Pertinent negatives include no anxiety, chest pain,  headaches, malaise/fatigue, neck pain, palpitations, shortness of breath or sweats. There are no associated agents to hypertension. Risk factors for coronary artery disease include male gender, dyslipidemia and obesity. Current antihypertension treatment includes beta blockers and ACE inhibitors. The current treatment provides moderate improvement. There are no compliance problems.             I personally reviewed and updated the patient's allergies, medications, problem list, and past medical, surgical, social, and family history. I have reviewed and confirmed the accuracy of the History of Present Illness and Review of Symptoms as documented by the MA/LPN/RN. Kian Weston MD    Family History   Problem Relation Age of Onset   • Hypertension Mother    • Diabetes Mother    • Heart disease Mother    • Heart attack Father    • Rheumatic fever Father    • No Known Problems Sister    • No Known Problems Brother    • No Known Problems Maternal Aunt    • No Known Problems Maternal Uncle    • No Known Problems Paternal Aunt    • No Known Problems Paternal Uncle    • No Known Problems Maternal Grandmother    • No Known Problems Maternal Grandfather    • No Known Problems Paternal Grandmother    • No Known Problems Paternal Grandfather    • No Known Problems Other    • Anemia Neg Hx    • Arrhythmia Neg Hx    • Asthma Neg Hx    • Clotting disorder Neg Hx    • Fainting Neg Hx    • Heart failure Neg Hx    • Hyperlipidemia Neg Hx        Social History     Tobacco Use   • Smoking status: Former Smoker     Packs/day: 1.00     Years: 10.00     Pack years: 10.00     Types: Cigarettes     Start date:      Quit date: 1990     Years since quittin.6   • Smokeless tobacco: Never Used   Vaping Use   • Vaping Use: Never used   Substance Use Topics   • Alcohol use: Yes   • Drug use: No       Past Surgical History:   Procedure Laterality Date   • CATARACT EXTRACTION, BILATERAL Bilateral    • CORONARY STENT PLACEMENT     •  EYE SURGERY  10/16/2019    repair detached retina   • NASAL SEPTUM SURGERY         Patient Active Problem List   Diagnosis   • Acute myocardial infarction, subsequent episode of care (CMS/[facility])   • Acute right-sided low back pain with right-sided sciatica   • Allergic rhinitis   • Coronary angioplasty status   • Edema   • Encounter for annual general medical examination with abnormal findings in adult   • Colon cancer screening   • Screening PSA (prostate specific antigen)   • GERD (gastroesophageal reflux disease)   • Hepatitis B   • Herpes zoster   • History of tobacco use   • HTN (hypertension), benign   • Hyperlipidemia   • Lumbar disc disease   • Hematoma   • Vertiginous syndrome and labyrinthine disorder   • Myalgia   • Nevus   • Obstructive sleep apnea   • Class 2 severe obesity due to excess calories with serious comorbidity and body mass index (BMI) of 39.0 to 39.9 in adult (CMS/HCC)   • Right hip pain   • Vitamin D deficiency   • Tendinitis of right rotator cuff   • Unstable angina (CMS/[facility])   • Chest pain in adult   • Elevated fasting glucose   • Left serous otitis media         Current Outpatient Medications:   •  aspirin 325 MG tablet, Take 1 tablet by mouth Daily., Disp: , Rfl:   •  carvedilol (COREG) 25 MG tablet, Take 1 tablet by mouth 2 (Two) Times a Day., Disp: 180 tablet, Rfl: 2  •  Cholecalciferol (VITAMIN D) 50 MCG (2000 UT) tablet, Take 1 tablet by mouth 2 (two) times a day., Disp: , Rfl:   •  cyclobenzaprine (FLEXERIL) 10 MG tablet, TAKE 1 TABLET BY MOUTH AT NIGHT AS NEEDED FOR MUSCLE SPASMS, Disp: 90 tablet, Rfl: 2  •  esomeprazole (nexIUM) 20 MG capsule, Take 20 mg by mouth Every Morning Before Breakfast., Disp: , Rfl:   •  fluticasone (FLONASE) 50 MCG/ACT nasal spray, 2 sprays into the nostril(s) as directed by provider Daily As Needed for Rhinitis or Allergies., Disp: 16 g, Rfl: 0  •  losartan (Cozaar) 25 MG tablet, Take 1 tablet by mouth Daily., Disp: 90 tablet, Rfl: 3  •  nitroglycerin  "(NITROLINGUAL) 0.4 MG/SPRAY spray, Place 1 spray under the tongue Every 5 (Five) Minutes As Needed for Chest Pain., Disp: 1 each, Rfl: 1  •  rosuvastatin (CRESTOR) 20 MG tablet, TAKE 1 TABLET BY MOUTH DAILY, Disp: 90 tablet, Rfl: 2         Review of Systems   Constitutional: Negative for chills, diaphoresis, fatigue and malaise/fatigue.   HENT: Negative for trouble swallowing and voice change.    Eyes: Negative for visual disturbance.   Respiratory: Negative for cough and shortness of breath.    Cardiovascular: Negative for chest pain and palpitations.   Gastrointestinal: Negative for abdominal pain, nausea and vomiting.   Endocrine: Negative for polydipsia and polyphagia.   Genitourinary: Negative for hematuria.   Musculoskeletal: Negative for myalgias, neck pain and neck stiffness.   Skin: Negative for color change and pallor.   Allergic/Immunologic: Negative for immunocompromised state.   Neurological: Negative for focal weakness, seizures and syncope.   Hematological: Negative for adenopathy.   Psychiatric/Behavioral: Negative for sleep disturbance and suicidal ideas.       I have reviewed and confirmed the accuracy of the ROS as documented by the MA/LPN/RN Kian Weston MD      Objective   /80   Pulse 82   Temp 97.3 °F (36.3 °C)   Resp 18   Ht 170.2 cm (67\")   Wt 115 kg (253 lb)   SpO2 98%   BMI 39.63 kg/m²   BP Readings from Last 3 Encounters:   08/09/21 120/80   07/29/21 162/74   06/29/21 137/72     Wt Readings from Last 3 Encounters:   08/09/21 115 kg (253 lb)   07/28/21 114 kg (252 lb 3.3 oz)   06/29/21 115 kg (254 lb 6.4 oz)     Physical Exam  Constitutional:       Appearance: Normal appearance. He is well-developed. He is not diaphoretic.   HENT:      Right Ear: Hearing, tympanic membrane, ear canal and external ear normal.      Left Ear: Hearing, tympanic membrane, ear canal and external ear normal.      Nose: Nose normal. No mucosal edema or congestion.      Right Sinus: No maxillary sinus " tenderness or frontal sinus tenderness.      Left Sinus: No maxillary sinus tenderness or frontal sinus tenderness.      Mouth/Throat:      Mouth: No oral lesions.      Pharynx: Uvula midline. No oropharyngeal exudate or posterior oropharyngeal erythema.      Tonsils: No tonsillar exudate.   Cardiovascular:      Rate and Rhythm: Normal rate and regular rhythm.      Pulses: Normal pulses.      Heart sounds: Normal heart sounds, S1 normal and S2 normal. No murmur heard.   No friction rub. No gallop.    Pulmonary:      Effort: Pulmonary effort is normal. No accessory muscle usage.      Breath sounds: Normal breath sounds. No stridor. No decreased breath sounds, wheezing, rhonchi or rales.   Abdominal:      General: Bowel sounds are normal. There is no distension.      Palpations: Abdomen is soft. Abdomen is not rigid. There is no mass or pulsatile mass.      Tenderness: There is no abdominal tenderness. There is no guarding or rebound. Negative signs include Khanna's sign.      Hernia: No hernia is present.   Skin:     General: Skin is warm and dry.      Coloration: Skin is not pale.   Neurological:      Mental Status: He is alert and oriented to person, place, and time.      Coordination: Coordination normal.      Gait: Gait normal.         Data / Lab Results:    Hemoglobin A1C   Date Value Ref Range Status   02/08/2021 5.6 % Final   08/08/2020 5.5 3.5 - 5.6 % Final     Lab Results   Component Value Date     (H) 01/25/2021     Lab Results   Component Value Date    LDL 46 01/25/2021    LDL 50 08/08/2020    LDL 50 07/20/2020     Lab Results   Component Value Date    CHOL 114 08/08/2020    CHOL 132 04/05/2019    CHOL 130 10/19/2018     Lab Results   Component Value Date    TRIG 90 01/25/2021    TRIG 144 08/08/2020    TRIG 175 (H) 07/20/2020     Lab Results   Component Value Date    HDL 39 (L) 01/25/2021    HDL 35 (L) 08/08/2020    HDL 39 (L) 07/20/2020     Lab Results   Component Value Date    PSA 0.4 07/20/2020     PSA 0.3 04/05/2019    PSA 0.4 04/03/2018     Lab Results   Component Value Date    WBC 6.90 07/29/2021    HGB 14.0 07/29/2021    HCT 41.1 07/29/2021    MCV 86.5 07/29/2021     07/29/2021     Lab Results   Component Value Date    TSH 2.530 01/25/2021      Lab Results   Component Value Date    GLUCOSE 93 07/29/2021    BUN 14 07/29/2021    CREATININE 1.16 07/29/2021    EGFRIFNONA 63 07/29/2021    EGFRIFAFRI 96 01/25/2021    BCR 12.1 07/29/2021    K 4.1 07/29/2021    CO2 28.0 07/29/2021    CALCIUM 8.9 07/29/2021    PROTENTOTREF 6.6 01/25/2021    ALBUMIN 4.4 01/25/2021    LABIL2 2.0 01/25/2021    AST 22 01/25/2021    ALT 37 01/25/2021     No results found for: MAT, RF, SEDRATE   No results found for: CRP   No results found for: IRON, TIBC, FERRITIN   Lab Results   Component Value Date    XHDJAHMR36 481 04/03/2018          Assessment/Plan      Medications        Problem List         LOS      Health maintenance.  Doing well, vaccines current.  Baby aspirin daily.  Discussed health maintenance, screening test, lifestyle modification.  Coronary artery disease.  Stable, followed by cardiology Dr. escobedo.  History of repeat stenting 2015.  Continue risk factor reduction.  Remote history of carotid Dopplers, repeat scheduled.  Hypertension.  Good control.  Hyperlipidemia.  Improved on Crestor.  Keto diet okay, Follow-up recheck fasting labs.  Retinal detachment.  December/2019.  Improved status post surgery.  Followed by ophthalmology.  Lumbar disc disease.  Followed by pain management/neurosurgery, has had repeat imagery..  s/p course epidural, consider radiofrequency ablation.  Prostate screening.  Follow-up check PSA.  GERD.  Stable on PPI.  EGD benign/dilation only 2019.  Colon cancer screening.  Colonoscopy benign 2019.  Repeat eval 10 years.  IRENE.  Stable on CPAP.  Rotator cuff tendinitis.    Right.  Much improved today status post injection.  Rehabilitation exercises discussed.  Consider imaging, Ortho  referral if persistent symptoms.  Elevated fasting blood sugar.  Discussed diet, exercise lifestyle modification.  Recheck 6 months.  Vocal tic.  Mild, infrequent symptoms currently.  Consider guanfacine.  Tremor.  Intermittent, benign exam today.  TSH normal.  Possibly secondary to benign physiologic tremor.  Follow-up recheck.  Consider further eval if worsening symptoms.  OA left shoulder.  Ice, Tylenol, rehabilitation exercise discussed.  Continue as needed nightly muscle relaxant.  Consider imaging if persistent symptoms.  Joint injection declined.  Carpal tunnel.  Start nightly bracing.  Chest pain.  Atypical.  Clinically improved/resolved had benign inpatient work-up.  Followed by cardiology.        Diagnoses and all orders for this visit:    1. Hospital discharge follow-up (Primary)    2. Elevated fasting glucose  -     POCT Glucose  -     POC Glycosylated Hemoglobin (Hb A1C)    3. HTN (hypertension), benign    4. Mixed hyperlipidemia    5. History of tobacco use    6. Class 2 severe obesity due to excess calories with serious comorbidity and body mass index (BMI) of 39.0 to 39.9 in adult (CMS/[facility])              Expected course, medications, and adverse effects discussed.  Call or return if worsening or persistent symptoms.  I wore protective equipment throughout this patient encounter including a mask, gloves, and eye protection.  Hand hygiene was performed before donning protective equipment and after removal when leaving the room. The complete contents of the Assessment and Plan and Data/Lab Results as documented above have been reviewed and addressed by myself with the patient today as part of an ongoing evaluation / treatment plan.  If some of the documentation has been copied from a previous note and is unchanged it indicates that this problem / plan has been assessed today but is stable from a previous visit and no changes have been recommended.

## 2021-08-16 NOTE — PROGRESS NOTES
John Wilburn  : 1955  66 y.o. male   Date of Service: 2021  Referring provider: Kian Weston MD     Patient wants New Cpap machine, did not bring in his chip to download.   Patient has had his CPAP for 20 years, he uses a nasal mask and wants to get his supplies from [facility].  He had his sleep study in early  he had it done at [place].    Mr. Wilburn is a very pleasant 66-year-old overweight  male who presented here referred by his primary care physician Dr. Weston, for evaluation of sleep apnea.  He has a history of sleep apnea being treated for 20 years and currently has a ResMed CPAP machine that is 20 years old.  He reports he has not had a sleep follow-up for many years and was initially diagnosed in [place] in .    He has a prior history of cardiac disease, hypertension, hyperlipidemia, MI, angioplasty, vertigo disorder, vitamin D deficiency, diabetes, GERD, low back pain, IRENE and herpes zoster.    History Of Present Illness:   Mr. John Wilburn  is a 66 y.o. right handed  male patient has a H/O of irene is here for the evaluation of Sleep Apnea.   The patient c/o daytime sleepiness issues:  There is no H/O sleep paralysis, hypnagogic hallucinations or cataplexy..    There is no history of hypnagogic hallucinations, sleep paralysis or cataplexy.  The patient complains of snoring does not snore while on cpap.  The patient complains of Leg symptoms: no.   The patient complains of problems with insomnia:  no.  The patient reports history of these childhood sleep problems: There is no h/O sleepwalking or bedwetting or nightmares or sleep eating or acting out dreams    Sleep schedule: Bedtime:9-10 , gets out of bed at 6:30, sleep latency: few minutes, Gets about 8-9 hours of sleep.      EPWORTH SLEEPINESS SCALE  Sitting and reading 0 WatchingTV 0  Sitting, inactive, in a public place 0  As a passenger in a car for 1 hour w/o a break  0  Lying down to rest in the  afternoon  0  Sitting and talking to someone  0  Sitting quietly after a lunch  0  In a car, while stopped for traffic or a light  0  Total 0      Past Medical History:   Diagnosis Date   • Acute bronchitis    • Acute myocardial infarction, subsequent episode of care (CMS/[facility])    • Acute non-recurrent frontal sinusitis    • Acute right-sided low back pain with right-sided sciatica     Impression: persistent, long standing back pain, worse, with rle radiculopathy, h/o vertebral fx, refractory to pt xray with severe degenerative changes l5/ s1 check mri, referrall to dr amarilys arias 20 minutes bid nsaids Rehabilitation exercises discussed. continue pt   • Allergic rhinitis    • Allergy to poison ivy     Impression: Due to the wide spread rash he was started on Prednisone per pt request. He was advised to RTC if his symptoms did not improve.   • Annual visit for general adult medical examination with abnormal findings     Impression: doing well, vaccines current Age specific anticipatory guidance and warning signs discussed. Diet, exercise, and lifestyle modification discussed. Safety, seatbelts, and routine screening examinations discussed. Discussed self-examinations.   • Bronchitis    • Cellulitis of buttock     Impression: possibly 2nd insect bite Topical care discussed. Discussed possible necessity of I and D. Call / return if fever, worsening symptoms.   • Chest pain     Impression: atypical, improved / resolved currently possibly 2nd nausea /gi upset /viral uri serial ekg's, troponin's normal followed by cardiology in [place], he reports negative treadmill cardiolyte 12/11 d/c to home, Follow up 2 to 3 days. Follow up with cardiology planned consider repeat stress test if chest pain on exertion, worsening symptoms.   • Colon cancer screening     Impression: has a colonoscopy, will get records   • Conjunctivitis     Impression: viral Topical care discussed. wash hands frequently   • Coronary atherosclerosis      Impression: h/o stent 2003, 2016 followed by [facility]eng, had recent neg stress test, will get records h/o normal carptid doppler per her report, will get records continue coreg, statin, effient continue risk factor reduction recommend cardiology Follow up he states h3 will consider   • Detached retina    • GERD (gastroesophageal reflux disease)    • Hematoma     Impression: hand, much improved xray neg for fracture per ed Signs and symptoms of infecton discussed. Call / return if worsening symptoms.   • Hepatitis B     Impression: h/o, recheck levels   • Herpes zoster     Impression: topical care discussed cover with antibiotics Follow up for zostavax   • History of tobacco use    • HTN (hypertension), benign     Impression: good control Discussed low sodium diet, lifestyle modification. Follow up recheck   • Hyperlipidemia     Impression: followed by Roxana blackman tolerating crestor rx, ldl to 71, + aggressive ldl target considering cad recheck   • Lumbar disc disease     Impression: followed by Nick Ashton undergoing epidual injxn   • Malaise and fatigue     Impression: check vitamin levels h/o low t do not recommend replacement consider age enedelia under good cobtrol consider wellbutrin Follow up recheck Call / return if worsening symptoms.   • Myalgia     Impression: possibly 2nd crestor, hold x 1 to 2 mos risk/benefit RX discussed, considering restart possibly at lower dose   • Nevus     Impression: path shows benign lentigo Discussed skin care, use of sun block and protective clothing.   • Obesity    • Obesity, morbid, BMI 40.0-49.9 (CMS/[facility])     DOCUMENTATION OF FOLLOW-UP PLAN FOR PATIENT WITH BMI ABOVE NORMAL ()   • Overweight (BMI 25.0-29.9)     Impression: Discussed diet, exercise, lifestyle modification. Follow up [id number]   • Pruritus    • Right hip pain     Impression: check xray   • Screening for depression     Negative Depression Screening (4 or less) ()   • Screening PSA (prostate specific  antigen)     Impression: psa normal / damián normal   • Sinusitis, bacterial     Impression: He was prescribed Cipro and Tessalon perles. Increase fluids. Tylenol/motrin for pain or fever. Medication and medication adverse effects discussed. Follow-up 5-7 days for reevaluation if not improved or sooner if needed.   • Skin lesion     Impression: rec resection / derm ref sched   • Sleep apnea, obstructive     Story: on CPAP   • URI (upper respiratory infection)     Impression: Increase fluid intake. Mucinex Call / return if fever, respiratory difficulty, worsening symptoms.   • Vertiginous syndrome and labyrinthine disorder     Impression: Differential diagnosis discussed. Follow-up in 2 weeks if not better. Follow-up sooner for worsening symptoms or for any concerns. Zyrtec as directed.   • Vertigo     Impression: likely secondary to sinusitis with eustachian tube dysfunction, rx in progress ddx includes vestibular neuritis, cover with prednisone Follow up recheck Consider imaging if persistent symptoms.   • Vitamin D deficiency      Past Surgical History:   Procedure Laterality Date   • CATARACT EXTRACTION, BILATERAL Bilateral    • CORONARY STENT PLACEMENT  2003   • EYE SURGERY  10/16/2019    repair detached retina   • NASAL SEPTUM SURGERY       Current Outpatient Medications on File Prior to Visit   Medication Sig Dispense Refill   • aspirin 325 MG tablet Take 1 tablet by mouth Daily.     • carvedilol (COREG) 25 MG tablet Take 1 tablet by mouth 2 (Two) Times a Day. 180 tablet 2   • Cholecalciferol (VITAMIN D) 50 MCG (2000 UT) tablet Take 1 tablet by mouth 2 (two) times a day.     • cyclobenzaprine (FLEXERIL) 10 MG tablet TAKE 1 TABLET BY MOUTH AT NIGHT AS NEEDED FOR MUSCLE SPASMS 90 tablet 2   • esomeprazole (nexIUM) 20 MG capsule Take 20 mg by mouth Every Morning Before Breakfast.     • fluticasone (FLONASE) 50 MCG/ACT nasal spray 2 sprays into the nostril(s) as directed by provider Daily As Needed for Rhinitis or  Allergies. 16 g 0   • losartan (Cozaar) 25 MG tablet Take 1 tablet by mouth Daily. 90 tablet 3   • nitroglycerin (NITROLINGUAL) 0.4 MG/SPRAY spray Place 1 spray under the tongue Every 5 (Five) Minutes As Needed for Chest Pain. 1 each 1   • rosuvastatin (CRESTOR) 20 MG tablet TAKE 1 TABLET BY MOUTH DAILY 90 tablet 2     No current facility-administered medications on file prior to visit.     Allergies   Allergen Reactions   • Vicodin [Hydrocodone-Acetaminophen] Hives   • Atorvastatin Hives   • Propoxyphene Hives     Family History   Problem Relation Age of Onset   • Hypertension Mother    • Diabetes Mother    • Heart disease Mother    • Heart attack Father    • Rheumatic fever Father    • No Known Problems Sister    • No Known Problems Brother    • No Known Problems Maternal Aunt    • No Known Problems Maternal Uncle    • No Known Problems Paternal Aunt    • No Known Problems Paternal Uncle    • No Known Problems Maternal Grandmother    • No Known Problems Maternal Grandfather    • No Known Problems Paternal Grandmother    • No Known Problems Paternal Grandfather    • No Known Problems Other    • Anemia Neg Hx    • Arrhythmia Neg Hx    • Asthma Neg Hx    • Clotting disorder Neg Hx    • Fainting Neg Hx    • Heart failure Neg Hx    • Hyperlipidemia Neg Hx      Social History     Socioeconomic History   • Marital status:      Spouse name: Not on file   • Number of children: Not on file   • Years of education: Not on file   • Highest education level: Not on file   Tobacco Use   • Smoking status: Former Smoker     Packs/day: 1.00     Years: 10.00     Pack years: 10.00     Types: Cigarettes     Start date:      Quit date: 1990     Years since quittin.6   • Smokeless tobacco: Never Used   Vaping Use   • Vaping Use: Never used   Substance and Sexual Activity   • Alcohol use: Yes   • Drug use: No   • Sexual activity: Defer     Review of Systems   Constitutional: Negative for fatigue and fever.   HENT:  Negative for ear discharge and ear pain.    Eyes: Negative for pain and itching.   Respiratory: Negative for cough and shortness of breath.    Cardiovascular: Negative for chest pain.   Gastrointestinal: Negative for abdominal pain and nausea.   Musculoskeletal: Positive for back pain. Negative for neck pain.   Neurological: Negative for dizziness and light-headedness.   Psychiatric/Behavioral: Negative for agitation and confusion.       I reviewed and addressed ROS entered by MA.    Patient examination:  Vitals:    08/18/21 0928   BP: 149/76   Pulse: 73   Temp: 98.2 °F (36.8 °C)    Body mass index is 39.78 kg/m².     Physical Exam  Vitals and nursing note reviewed.   Constitutional:       Appearance: Normal appearance. He is well-developed, well-groomed and overweight.   HENT:      Head: Normocephalic.      Comments: Mallampati Class 3     Nose: Nose normal.      Mouth/Throat:      Mouth: Mucous membranes are moist.   Eyes:      General: Lids are normal.      Extraocular Movements: Extraocular movements intact.      Pupils: Pupils are equal, round, and reactive to light.   Cardiovascular:      Rate and Rhythm: Normal rate and regular rhythm.      Pulses: Normal pulses.      Heart sounds: Normal heart sounds.   Pulmonary:      Effort: Pulmonary effort is normal.      Breath sounds: Normal breath sounds.   Musculoskeletal:         General: Normal range of motion.      Cervical back: Full passive range of motion without pain and normal range of motion.   Neurological:      General: No focal deficit present.      Mental Status: He is alert and oriented to person, place, and time.      Gait: Gait is intact.   Psychiatric:         Attention and Perception: Attention normal.         Mood and Affect: Mood normal.         Speech: Speech normal.         Behavior: Behavior normal. Behavior is cooperative.         Thought Content: Thought content normal.         Cognition and Memory: Cognition normal.         Judgment:  Judgment normal.         ASSESSMENT AND PLAN:  The patient will be scheduled for a home sleep study, he is requesting a ResMed machine when his machine is replaced.    He has been using a so clean machine and was informed that that because it can cause breakdown of particles inside the machines prematurely causing health issues.   He was instructed to discontinue using the so clean machine in light of the recent data.  He will be scheduled back after the home sleep study is completed and he has had a trial on the new machine.  He was told to call the clinic if he has any questions or concerns.     Diagnoses and all orders for this visit:    1. Obstructive sleep apnea (Primary)  -     Home Sleep Study; Future         Return in about 3 months (around 11/18/2021) for Next scheduled follow up post Study.    I spent 25 minutes caring for John on this date of service. This time includes time spent by me in the following activities: obtaining and/or reviewing a separately obtained history, performing a medically appropriate examination and/or evaluation, counseling and educating the patient/family/caregiver, ordering medications, tests, or procedures, referring and communicating with other health care professionals and documenting information in the medical record.      This document has been electronically signed by Katharina TOMAS on August 18, 2021 10:07 EDT

## 2021-08-18 ENCOUNTER — OFFICE VISIT (OUTPATIENT)
Dept: NEUROLOGY | Facility: CLINIC | Age: 66
End: 2021-08-18

## 2021-08-18 VITALS
DIASTOLIC BLOOD PRESSURE: 76 MMHG | BODY MASS INDEX: 39.87 KG/M2 | WEIGHT: 254 LBS | HEART RATE: 73 BPM | HEIGHT: 67 IN | SYSTOLIC BLOOD PRESSURE: 149 MMHG | TEMPERATURE: 98.2 F

## 2021-08-18 DIAGNOSIS — G47.33 OBSTRUCTIVE SLEEP APNEA: Primary | ICD-10-CM

## 2021-08-18 PROCEDURE — 99202 OFFICE O/P NEW SF 15 MIN: CPT | Performed by: NURSE PRACTITIONER

## 2021-08-22 PROBLEM — I25.10 CORONARY ARTERY DISEASE INVOLVING NATIVE CORONARY ARTERY OF NATIVE HEART: Status: ACTIVE | Noted: 2021-08-22

## 2021-09-17 ENCOUNTER — HOSPITAL ENCOUNTER (OUTPATIENT)
Dept: SLEEP MEDICINE | Facility: HOSPITAL | Age: 66
Discharge: HOME | End: 2021-09-17
Admitting: NURSE PRACTITIONER

## 2021-09-17 DIAGNOSIS — G47.33 OBSTRUCTIVE SLEEP APNEA: ICD-10-CM

## 2021-09-17 PROCEDURE — 95806 SLEEP STUDY UNATT&RESP EFFT: CPT | Performed by: PSYCHIATRY & NEUROLOGY

## 2021-09-17 PROCEDURE — 95806 SLEEP STUDY UNATT&RESP EFFT: CPT

## 2021-09-27 NOTE — PROGRESS NOTES
I called the patient this morning and notified him of his test.  He is not set up with any company to get equipment and states just 1 close to home is fine.  His previous testing was done in [place].  He would like a ResMed machine, that is the one that he has used in the past and really likes.

## 2021-10-27 ENCOUNTER — TELEPHONE (OUTPATIENT)
Dept: NEUROLOGY | Facility: CLINIC | Age: 66
End: 2021-10-27

## 2021-10-27 NOTE — TELEPHONE ENCOUNTER
Provider: FADI LOZANO  Caller: PATIENT  Relationship to Patient: SELF  Pharmacy: NA  Phone Number: [phone]  Reason for Call: PATIENT STATES HE HAS NOT HEARD ANYTHING MORE ABOUT GETTING HIS CPAP MACHINE. HE HAD SLEEP STUDY COMPLETED AND SAID SOMEONE HAD CALLED HIM ABOUT THE NEW CPAP AND HAS NOT HEARD ANYTHING ELSE YET. PATIENT STATES HE DOES NOT WANT TO MAKE APPOINTMENT UNTIL HE GETS HIS NEW CPAP MACHINE. PLEASE ADVISE.   When was the patient last seen: 8-18-21

## 2021-10-28 ENCOUNTER — TELEPHONE (OUTPATIENT)
Dept: NEUROLOGY | Facility: CLINIC | Age: 66
End: 2021-10-28

## 2021-10-28 DIAGNOSIS — G47.33 OBSTRUCTIVE SLEEP APNEA: Primary | ICD-10-CM

## 2021-11-16 ENCOUNTER — OFFICE VISIT (OUTPATIENT)
Dept: FAMILY MEDICINE CLINIC | Facility: CLINIC | Age: 66
End: 2021-11-16

## 2021-11-16 VITALS
RESPIRATION RATE: 18 BRPM | DIASTOLIC BLOOD PRESSURE: 74 MMHG | OXYGEN SATURATION: 98 % | HEIGHT: 67 IN | HEART RATE: 52 BPM | SYSTOLIC BLOOD PRESSURE: 122 MMHG | TEMPERATURE: 97.8 F | BODY MASS INDEX: 40.97 KG/M2 | WEIGHT: 261 LBS

## 2021-11-16 DIAGNOSIS — M25.522 LEFT ELBOW PAIN: ICD-10-CM

## 2021-11-16 DIAGNOSIS — H92.02 LEFT EAR PAIN: ICD-10-CM

## 2021-11-16 DIAGNOSIS — R00.1 BRADYCARDIA: Primary | ICD-10-CM

## 2021-11-16 DIAGNOSIS — H65.02 ACUTE SEROUS OTITIS MEDIA OF LEFT EAR, RECURRENCE NOT SPECIFIED: ICD-10-CM

## 2021-11-16 PROCEDURE — 99214 OFFICE O/P EST MOD 30 MIN: CPT | Performed by: FAMILY MEDICINE

## 2021-11-16 RX ORDER — CEPHALEXIN 500 MG/1
500 CAPSULE ORAL 3 TIMES DAILY
Qty: 30 CAPSULE | Refills: 0 | Status: SHIPPED | OUTPATIENT
Start: 2021-11-16 | End: 2022-08-29

## 2021-11-16 RX ORDER — METHYLPREDNISOLONE 4 MG/1
TABLET ORAL
Qty: 21 TABLET | Refills: 0 | Status: SHIPPED | OUTPATIENT
Start: 2021-11-16 | End: 2021-12-06

## 2021-11-16 NOTE — PROGRESS NOTES
Subjective   John Wilburn is a 66 y.o. male. Presents to [facility]    Chief Complaint   Patient presents with   • URI   • Arm Pain       URI   This is a new problem. The current episode started 1 to 4 weeks ago. The problem has been gradually worsening. There has been no fever. Associated symptoms include ear pain, headaches, a plugged ear sensation and sinus pain. Pertinent negatives include no abdominal pain, chest pain, congestion, coughing, diarrhea, nausea, rash, rhinorrhea, sore throat or vomiting. Treatments tried: benadryl. The treatment provided mild relief.   Arm Pain   The incident occurred more than 1 week ago. There was no injury mechanism. The pain is present in the left forearm. The pain radiates to the left arm. The pain has been fluctuating since the incident. Associated symptoms include muscle weakness. Pertinent negatives include no chest pain, numbness or tingling. The symptoms are aggravated by lifting.    left ear is bothering him for a couple of weeks as is his sinuses as well. He has taken allergy meds with no change.     He has had left arm pain in his left elbow and forearm.     I personally reviewed and updated the patient's allergies, medications, problem list, and past medical, surgical, social, and family history. I have reviewed and confirmed the accuracy of the History of Present Illness and Review of Symptoms as documented by the MA/LPN/RN. Bella Souza MD    Allergies:  Allergies   Allergen Reactions   • Vicodin [Hydrocodone-Acetaminophen] Hives   • Atorvastatin Hives   • Propoxyphene Hives       Social History:  Social History     Socioeconomic History   • Marital status:    Tobacco Use   • Smoking status: Former Smoker     Packs/day: 1.00     Years: 10.00     Pack years: 10.00     Types: Cigarettes     Start date:      Quit date: 1990     Years since quittin.9   • Smokeless tobacco: Never Used   Vaping Use   • Vaping Use: Never used    Substance and Sexual Activity   • Alcohol use: Yes   • Drug use: No   • Sexual activity: Defer       Family History:  Family History   Problem Relation Age of Onset   • Hypertension Mother    • Diabetes Mother    • Heart disease Mother    • Heart attack Father    • Rheumatic fever Father    • No Known Problems Sister    • No Known Problems Brother    • No Known Problems Maternal Aunt    • No Known Problems Maternal Uncle    • No Known Problems Paternal Aunt    • No Known Problems Paternal Uncle    • No Known Problems Maternal Grandmother    • No Known Problems Maternal Grandfather    • No Known Problems Paternal Grandmother    • No Known Problems Paternal Grandfather    • No Known Problems Other    • Anemia Neg Hx    • Arrhythmia Neg Hx    • Asthma Neg Hx    • Clotting disorder Neg Hx    • Fainting Neg Hx    • Heart failure Neg Hx    • Hyperlipidemia Neg Hx        Past Medical History :  Patient Active Problem List   Diagnosis   • Acute myocardial infarction, subsequent episode of care ([facility])   • Acute right-sided low back pain with right-sided sciatica   • Allergic rhinitis   • Coronary angioplasty status   • Edema   • Encounter for annual general medical examination with abnormal findings in adult   • Colon cancer screening   • Screening PSA (prostate specific antigen)   • GERD (gastroesophageal reflux disease)   • Hepatitis B   • Herpes zoster   • History of tobacco use   • HTN (hypertension), benign   • Hyperlipidemia   • Lumbar disc disease   • Hematoma   • Vertiginous syndrome and labyrinthine disorder   • Myalgia   • Nevus   • Obstructive sleep apnea   • Class 2 severe obesity due to excess calories with serious comorbidity and body mass index (BMI) of 39.0 to 39.9 in adult ([facility])   • Right hip pain   • Vitamin D deficiency   • Tendinitis of right rotator cuff   • Left ear pain   • Unstable angina ([facility])   • Chest pain in adult   • Elevated fasting glucose   • Left serous otitis media   • Coronary artery  disease involving native coronary artery of native heart   • Bradycardia   • Left elbow pain       Medication List:    Current Outpatient Medications:   •  aspirin 325 MG tablet, Take 1 tablet by mouth Daily., Disp: , Rfl:   •  carvedilol (COREG) 25 MG tablet, Take 1 tablet by mouth 2 (Two) Times a Day., Disp: 180 tablet, Rfl: 2  •  Cholecalciferol (VITAMIN D) 50 MCG (2000 UT) tablet, Take 1 tablet by mouth 2 (two) times a day., Disp: , Rfl:   •  cyclobenzaprine (FLEXERIL) 10 MG tablet, TAKE 1 TABLET BY MOUTH AT NIGHT AS NEEDED FOR MUSCLE SPASMS, Disp: 90 tablet, Rfl: 2  •  esomeprazole (nexIUM) 20 MG capsule, Take 20 mg by mouth Every Morning Before Breakfast., Disp: , Rfl:   •  fluticasone (FLONASE) 50 MCG/ACT nasal spray, 2 sprays into the nostril(s) as directed by provider Daily As Needed for Rhinitis or Allergies., Disp: 16 g, Rfl: 0  •  losartan (Cozaar) 25 MG tablet, Take 1 tablet by mouth Daily., Disp: 90 tablet, Rfl: 3  •  nitroglycerin (NITROLINGUAL) 0.4 MG/SPRAY spray, Place 1 spray under the tongue Every 5 (Five) Minutes As Needed for Chest Pain., Disp: 1 each, Rfl: 1  •  rosuvastatin (CRESTOR) 20 MG tablet, TAKE 1 TABLET BY MOUTH DAILY, Disp: 90 tablet, Rfl: 2  •  cephalexin (KEFLEX) 500 MG capsule, Take 1 capsule by mouth 3 (Three) Times a Day., Disp: 30 capsule, Rfl: 0  •  methylPREDNISolone (MEDROL) 4 MG dose pack, 6 tablets on day one, 5 tablets on day two, 4 tablets on day three, 3 tablets on day four, 2 tablets on day five, 1 tablet on day 6., Disp: 21 tablet, Rfl: 0    Past Surgical History:  Past Surgical History:   Procedure Laterality Date   • CATARACT EXTRACTION, BILATERAL Bilateral    • CORONARY STENT PLACEMENT  2003   • EYE SURGERY  10/16/2019    repair detached retina   • NASAL SEPTUM SURGERY         Review of Systems:  Review of Systems   Constitutional: Negative for activity change and fever.   HENT: Positive for ear pain. Negative for congestion, rhinorrhea, sinus pressure, sore throat  "and voice change.    Eyes: Negative for visual disturbance.   Respiratory: Negative for cough and shortness of breath.    Cardiovascular: Negative for chest pain.   Gastrointestinal: Negative for abdominal pain, diarrhea, nausea and vomiting.   Endocrine: Negative for cold intolerance and heat intolerance.   Genitourinary: Negative for frequency and urgency.   Musculoskeletal: Negative for arthralgias.   Skin: Negative for rash.   Neurological: Negative for tingling, syncope and numbness.   Hematological: Does not bruise/bleed easily.   Psychiatric/Behavioral: Negative for depressed mood. The patient is not nervous/anxious.        Physical Exam:  Vital Signs:  Vital Signs:   /74   Pulse 52   Temp 97.8 °F (36.6 °C)   Resp 18   Ht 170.2 cm (67\")   Wt 118 kg (261 lb)   SpO2 98%   BMI 40.88 kg/m²     Result Review :                Physical Exam  Vitals reviewed.   Constitutional:       Appearance: Normal appearance. He is well-developed.   HENT:      Head: Normocephalic and atraumatic.      Right Ear: External ear normal. No decreased hearing noted. No tenderness. A middle ear effusion is present. Tympanic membrane is not erythematous or bulging.      Left Ear: Tympanic membrane and external ear normal. No decreased hearing noted. No tenderness.  No middle ear effusion. Tympanic membrane is not erythematous or bulging.      Nose: Nose normal.      Mouth/Throat:      Pharynx: No oropharyngeal exudate or posterior oropharyngeal erythema.   Eyes:      General:         Right eye: No discharge.         Left eye: No discharge.   Cardiovascular:      Rate and Rhythm: Normal rate and regular rhythm.      Heart sounds: Normal heart sounds. No murmur heard.  No friction rub. No gallop.    Pulmonary:      Effort: Pulmonary effort is normal. No respiratory distress.      Breath sounds: Normal breath sounds. No wheezing or rales.   Musculoskeletal:      Left elbow: Tenderness present in lateral epicondyle.   Skin:     " General: Skin is warm and dry.      Findings: No rash.   Neurological:      Mental Status: He is alert and oriented to person, place, and time.      Coordination: Coordination normal.      Gait: Gait normal.   Psychiatric:         Behavior: Behavior is cooperative.         Assessment and Plan:  Problems Addressed this Visit        Cardiac and Vasculature    Bradycardia - Primary     His pulse is low today  Cut carvedilol in half. Follow up with Dr Garcia.             ENT    Left ear pain    Left serous otitis media     increase fluids, tylenol for fever, motrin for pain. Humidifier to help with congestion and to sleep at night. Dicussed OTC meds, gargle with warm salt water. If there is recurrent fever, shortness of breath, lethargy, advised to come in to the office or go to the ER.    Discussed risks of steroids: hyperglycemia, osteoporosis, avascular necrosis, anxiety, insomnia and cataracts. Patient states understanding           Relevant Medications    cephalexin (KEFLEX) 500 MG capsule    methylPREDNISolone (MEDROL) 4 MG dose pack       Musculoskeletal and Injuries    Left elbow pain     Lateral epicondylitis    Diagnosis, treatment and and course discussed. Potential side effects discussed. Return if there is worsening or persistence of symptoms.     Discussed risks of steroids: hyperglycemia, osteoporosis, avascular necrosis, anxiety, insomnia and cataracts. Patient states understanding  Discussed getting a lat strap. Possibly imaging and PT           Diagnoses       Codes Comments    Bradycardia    -  Primary ICD-10-CM: R00.1  ICD-9-CM: 427.89     Left ear pain     ICD-10-CM: H92.02  ICD-9-CM: 388.70     Acute serous otitis media of left ear, recurrence not specified     ICD-10-CM: H65.02  ICD-9-CM: 381.01     Left elbow pain     ICD-10-CM: M25.522  ICD-9-CM: 719.42            An After Visit Summary and PPPS were given to the patient.       I wore protective equipment throughout this patient encounter to  include mask. Hand hygiene was performed before donning protective equipment and after removal when leaving the room.

## 2021-11-22 RX ORDER — LOSARTAN POTASSIUM 25 MG/1
25 TABLET ORAL DAILY
Qty: 90 TABLET | Refills: 3 | Status: SHIPPED | OUTPATIENT
Start: 2021-11-22 | End: 2021-12-02 | Stop reason: SDUPTHER

## 2021-11-22 NOTE — ASSESSMENT & PLAN NOTE
Lateral epicondylitis    Diagnosis, treatment and and course discussed. Potential side effects discussed. Return if there is worsening or persistence of symptoms.     Discussed risks of steroids: hyperglycemia, osteoporosis, avascular necrosis, anxiety, insomnia and cataracts. Patient states understanding  Discussed getting a lat strap. Possibly imaging and PT

## 2021-11-22 NOTE — TELEPHONE ENCOUNTER
Rx Refill Note  Requested Prescriptions     Pending Prescriptions Disp Refills   • losartan (COZAAR) 25 MG tablet [Pharmacy Med Name: LOSARTAN 25MG TABLETS] 90 tablet 3     Sig: TAKE 1 TABLET BY MOUTH DAILY      Last office visit with prescribing clinician: 12/4/2020      Next office visit with prescribing clinician: 12/6/2021     Basic Metabolic Panel (07/29/2021 02:44)         Negrita Burger MA  11/22/21, 10:34 EST

## 2021-11-22 NOTE — ASSESSMENT & PLAN NOTE
increase fluids, tylenol for fever, motrin for pain. Humidifier to help with congestion and to sleep at night. Dicussed OTC meds, gargle with warm salt water. If there is recurrent fever, shortness of breath, lethargy, advised to come in to the office or go to the ER.    Discussed risks of steroids: hyperglycemia, osteoporosis, avascular necrosis, anxiety, insomnia and cataracts. Patient states understanding

## 2021-12-02 RX ORDER — LOSARTAN POTASSIUM 25 MG/1
25 TABLET ORAL DAILY
Qty: 90 TABLET | Refills: 1 | Status: SHIPPED | OUTPATIENT
Start: 2021-12-02 | End: 2022-08-10 | Stop reason: SDUPTHER

## 2021-12-03 ENCOUNTER — OFFICE VISIT (OUTPATIENT)
Dept: FAMILY MEDICINE CLINIC | Facility: CLINIC | Age: 66
End: 2021-12-03

## 2021-12-03 VITALS
DIASTOLIC BLOOD PRESSURE: 78 MMHG | HEIGHT: 67 IN | BODY MASS INDEX: 40.56 KG/M2 | WEIGHT: 258.4 LBS | OXYGEN SATURATION: 98 % | SYSTOLIC BLOOD PRESSURE: 118 MMHG | TEMPERATURE: 98.4 F | RESPIRATION RATE: 18 BRPM | HEART RATE: 92 BPM

## 2021-12-03 DIAGNOSIS — Z87.891 HISTORY OF TOBACCO USE: ICD-10-CM

## 2021-12-03 DIAGNOSIS — H66.90 ACUTE OTITIS MEDIA, UNSPECIFIED OTITIS MEDIA TYPE: Primary | ICD-10-CM

## 2021-12-03 DIAGNOSIS — M25.522 LEFT ELBOW PAIN: ICD-10-CM

## 2021-12-03 DIAGNOSIS — Z23 NEED FOR INFLUENZA VACCINATION: ICD-10-CM

## 2021-12-03 DIAGNOSIS — E66.01 CLASS 2 SEVERE OBESITY DUE TO EXCESS CALORIES WITH SERIOUS COMORBIDITY AND BODY MASS INDEX (BMI) OF 39.0 TO 39.9 IN ADULT (HCC): Chronic | ICD-10-CM

## 2021-12-03 PROCEDURE — 99213 OFFICE O/P EST LOW 20 MIN: CPT | Performed by: FAMILY MEDICINE

## 2021-12-03 PROCEDURE — 90686 IIV4 VACC NO PRSV 0.5 ML IM: CPT | Performed by: FAMILY MEDICINE

## 2021-12-03 PROCEDURE — 20605 DRAIN/INJ JOINT/BURSA W/O US: CPT | Performed by: FAMILY MEDICINE

## 2021-12-03 PROCEDURE — 90471 IMMUNIZATION ADMIN: CPT | Performed by: FAMILY MEDICINE

## 2021-12-03 RX ORDER — CIPROFLOXACIN 500 MG/1
500 TABLET, FILM COATED ORAL 2 TIMES DAILY
Qty: 20 TABLET | Refills: 0 | Status: SHIPPED | OUTPATIENT
Start: 2021-12-03 | End: 2021-12-06

## 2021-12-03 RX ORDER — METHYLPREDNISOLONE ACETATE 80 MG/ML
40 INJECTION, SUSPENSION INTRA-ARTICULAR; INTRALESIONAL; INTRAMUSCULAR; SOFT TISSUE ONCE
Status: COMPLETED | OUTPATIENT
Start: 2021-12-03 | End: 2021-12-03

## 2021-12-03 RX ADMIN — METHYLPREDNISOLONE ACETATE 40 MG: 80 INJECTION, SUSPENSION INTRA-ARTICULAR; INTRALESIONAL; INTRAMUSCULAR; SOFT TISSUE at 15:11

## 2021-12-03 NOTE — PROGRESS NOTES
Subjective   John Wilburn is a 66 y.o. male.     Chief Complaint   Patient presents with   • Earache   • Arm Pain     left arm       Arm Pain   The incident occurred more than 1 week ago. There was no injury mechanism. The pain is present in the left forearm. The pain radiates to the left arm. The pain is at a severity of 1/10. The pain is mild. The pain has been improving since the incident. Associated symptoms include muscle weakness. Pertinent negatives include no chest pain, numbness or tingling. The symptoms are aggravated by lifting. He has tried acetaminophen and NSAIDs (prednisone ) for the symptoms. The treatment provided significant relief.   Earache   There is pain in both ears. This is a recurrent problem. The current episode started 1 to 4 weeks ago. The problem occurs constantly. The problem has been unchanged. There has been no fever. The pain is at a severity of 0/10. The patient is experiencing no pain. Pertinent negatives include no abdominal pain, coughing, diarrhea, ear discharge, headaches, hearing loss, neck pain, rash, rhinorrhea, sore throat or vomiting. He has tried acetaminophen, NSAIDs and antibiotics for the symptoms. The treatment provided mild relief. His past medical history is significant for a chronic ear infection and hearing loss. There is no history of a tympanostomy tube.            I personally reviewed and updated the patient's allergies, medications, problem list, and past medical, surgical, social, and family history. I have reviewed and confirmed the accuracy of the History of Present Illness and Review of Symptoms as documented by the MA/BETZY/RN. Kian Weston MD    Family History   Problem Relation Age of Onset   • Hypertension Mother    • Diabetes Mother    • Heart disease Mother    • Heart attack Father    • Rheumatic fever Father    • No Known Problems Sister    • No Known Problems Brother    • No Known Problems Maternal Aunt    • No Known Problems Maternal Uncle    •  No Known Problems Paternal Aunt    • No Known Problems Paternal Uncle    • No Known Problems Maternal Grandmother    • No Known Problems Maternal Grandfather    • No Known Problems Paternal Grandmother    • No Known Problems Paternal Grandfather    • No Known Problems Other    • Anemia Neg Hx    • Arrhythmia Neg Hx    • Asthma Neg Hx    • Clotting disorder Neg Hx    • Fainting Neg Hx    • Heart failure Neg Hx    • Hyperlipidemia Neg Hx        Social History     Tobacco Use   • Smoking status: Former Smoker     Packs/day: 1.00     Years: 10.00     Pack years: 10.00     Types: Cigarettes     Start date:      Quit date: 1990     Years since quittin.9   • Smokeless tobacco: Never Used   Vaping Use   • Vaping Use: Never used   Substance Use Topics   • Alcohol use: Yes   • Drug use: No       Past Surgical History:   Procedure Laterality Date   • CATARACT EXTRACTION, BILATERAL Bilateral    • CORONARY STENT PLACEMENT     • EYE SURGERY  10/16/2019    repair detached retina   • NASAL SEPTUM SURGERY         Patient Active Problem List   Diagnosis   • Acute myocardial infarction, subsequent episode of care ([facility])   • Acute right-sided low back pain with right-sided sciatica   • Allergic rhinitis   • Coronary angioplasty status   • Edema   • Encounter for annual general medical examination with abnormal findings in adult   • Colon cancer screening   • Screening PSA (prostate specific antigen)   • GERD (gastroesophageal reflux disease)   • Hepatitis B   • Herpes zoster   • History of tobacco use   • HTN (hypertension), benign   • Hyperlipidemia   • Lumbar disc disease   • Hematoma   • Vertiginous syndrome and labyrinthine disorder   • Myalgia   • Nevus   • Obstructive sleep apnea   • Class 2 severe obesity due to excess calories with serious comorbidity and body mass index (BMI) of 39.0 to 39.9 in adult (Former[facility])   • Right hip pain   • Vitamin D deficiency   • Tendinitis of right rotator cuff   • Left ear pain   •  Unstable angina (HCC)   • Chest pain in adult   • Elevated fasting glucose   • Left serous otitis media   • Coronary artery disease involving native coronary artery of native heart   • Bradycardia   • Left elbow pain         Current Outpatient Medications:   •  aspirin 325 MG tablet, Take 1 tablet by mouth Daily., Disp: , Rfl:   •  carvedilol (COREG) 25 MG tablet, Take 1 tablet by mouth 2 (Two) Times a Day., Disp: 180 tablet, Rfl: 2  •  Cholecalciferol (VITAMIN D) 50 MCG (2000 UT) tablet, Take 1 tablet by mouth 2 (two) times a day., Disp: , Rfl:   •  cyclobenzaprine (FLEXERIL) 10 MG tablet, TAKE 1 TABLET BY MOUTH AT NIGHT AS NEEDED FOR MUSCLE SPASMS, Disp: 90 tablet, Rfl: 2  •  fluticasone (FLONASE) 50 MCG/ACT nasal spray, 2 sprays into the nostril(s) as directed by provider Daily As Needed for Rhinitis or Allergies., Disp: 16 g, Rfl: 0  •  losartan (COZAAR) 25 MG tablet, Take 1 tablet by mouth Daily., Disp: 90 tablet, Rfl: 1  •  nitroglycerin (NITROLINGUAL) 0.4 MG/SPRAY spray, Place 1 spray under the tongue Every 5 (Five) Minutes As Needed for Chest Pain., Disp: 1 each, Rfl: 1  •  rosuvastatin (CRESTOR) 20 MG tablet, TAKE 1 TABLET BY MOUTH DAILY, Disp: 90 tablet, Rfl: 2  •  cephalexin (KEFLEX) 500 MG capsule, Take 1 capsule by mouth 3 (Three) Times a Day., Disp: 30 capsule, Rfl: 0         Review of Systems   Constitutional: Negative for chills and diaphoresis.   HENT: Positive for ear pain. Negative for ear discharge, hearing loss, rhinorrhea and sore throat.    Eyes: Negative for visual disturbance.   Respiratory: Negative for cough and shortness of breath.    Cardiovascular: Negative for chest pain and palpitations.   Gastrointestinal: Negative for abdominal pain, diarrhea, nausea and vomiting.   Endocrine: Negative for polydipsia and polyphagia.   Musculoskeletal: Negative for neck pain and neck stiffness.   Skin: Negative for color change, pallor and rash.   Neurological: Negative for tingling, seizures,  "syncope and numbness.   Hematological: Negative for adenopathy.   Psychiatric/Behavioral: Negative for hallucinations and suicidal ideas.       I have reviewed and confirmed the accuracy of the ROS as documented by the MA/LPN/RN Kian Weston MD      Objective   /78   Pulse 92   Temp 98.4 °F (36.9 °C)   Resp 18   Ht 170.2 cm (67\")   Wt 117 kg (258 lb 6.4 oz)   SpO2 98%   BMI 40.47 kg/m²   BP Readings from Last 3 Encounters:   12/06/21 143/84   12/03/21 118/78   11/16/21 122/74     Wt Readings from Last 3 Encounters:   12/06/21 117 kg (257 lb)   12/03/21 117 kg (258 lb 6.4 oz)   11/16/21 118 kg (261 lb)     Physical Exam  Constitutional:       Appearance: Normal appearance. He is well-developed. He is not diaphoretic.   Cardiovascular:      Rate and Rhythm: Normal rate and regular rhythm.      Pulses: Normal pulses.      Heart sounds: Normal heart sounds, S1 normal and S2 normal. No murmur heard.  No friction rub. No gallop.    Pulmonary:      Effort: Pulmonary effort is normal. No accessory muscle usage.      Breath sounds: Normal breath sounds. No stridor. No decreased breath sounds, wheezing, rhonchi or rales.   Abdominal:      General: Bowel sounds are normal. There is no distension.      Palpations: Abdomen is soft. Abdomen is not rigid. There is no mass or pulsatile mass.      Tenderness: There is no abdominal tenderness. There is no guarding or rebound. Negative signs include Khanna's sign.      Hernia: No hernia is present.   Musculoskeletal:      Right shoulder: Normal. No swelling, deformity, effusion or crepitus. Normal range of motion. Normal strength.      Left shoulder: Normal. No swelling, deformity, effusion, tenderness or crepitus. Normal range of motion. Normal strength.      Right elbow: Normal. No swelling, deformity or effusion. Normal range of motion. No tenderness. No radial head, medial epicondyle, lateral epicondyle or olecranon process tenderness.      Left elbow: Normal. No " swelling, deformity, effusion or lacerations. Normal range of motion. No tenderness. No radial head, medial epicondyle, lateral epicondyle or olecranon process tenderness.      Cervical back: No swelling, deformity, spasms or tenderness.   Skin:     General: Skin is warm and dry.      Coloration: Skin is not pale.   Neurological:      Mental Status: He is alert and oriented to person, place, and time.      Coordination: Coordination normal.      Gait: Gait normal.         Data / Lab Results:    Hemoglobin A1C   Date Value Ref Range Status   08/09/2021 5.8 % Final   02/08/2021 5.6 % Final   08/08/2020 5.5 3.5 - 5.6 % Final        Lab Results   Component Value Date    LDL 46 01/25/2021    LDL 50 08/08/2020    LDL 50 07/20/2020     Lab Results   Component Value Date    CHOL 114 08/08/2020    CHOL 132 04/05/2019    CHOL 130 10/19/2018     Lab Results   Component Value Date    TRIG 90 01/25/2021    TRIG 144 08/08/2020    TRIG 175 (H) 07/20/2020     Lab Results   Component Value Date    HDL 39 (L) 01/25/2021    HDL 35 (L) 08/08/2020    HDL 39 (L) 07/20/2020     Lab Results   Component Value Date    PSA 0.4 07/20/2020    PSA 0.3 04/05/2019    PSA 0.4 04/03/2018     Lab Results   Component Value Date    WBC 6.90 07/29/2021    HGB 14.0 07/29/2021    HCT 41.1 07/29/2021    MCV 86.5 07/29/2021     07/29/2021     Lab Results   Component Value Date    TSH 2.530 01/25/2021      Lab Results   Component Value Date    GLUCOSE 93 07/29/2021    BUN 14 07/29/2021    CREATININE 1.16 07/29/2021    EGFRIFNONA 63 07/29/2021    EGFRIFAFRI 96 01/25/2021    BCR 12.1 07/29/2021    K 4.1 07/29/2021    CO2 28.0 07/29/2021    CALCIUM 8.9 07/29/2021    PROTENTOTREF 6.6 01/25/2021    ALBUMIN 4.4 01/25/2021    LABIL2 2.0 01/25/2021    AST 22 01/25/2021    ALT 37 01/25/2021     No results found for: MAT, RF, SEDRATE   No results found for: CRP   No results found for: IRON, TIBC, FERRITIN   Lab Results   Component Value Date    [id number]  04/03/2018          Assessment/Plan   Elbow Joint injection  Injection site: LEFT ELBOW  Date/Time: 12/03/2021 16:53 EST  Performed by: Kian Weston MD  Authorized by: Kian Weston MD  Preparation: Patient was prepped and draped in the usual sterile fashion.  Local anesthesia used: no   Anesthesia:  Local anesthesia used: no   Sedation:  Patient sedated: no    Medications Used:  0.5cc Lidocaine 10mg/mL: [id number] EXP-3/1/2022   1cc DepoMedrol 40mg: [id number] EXP-11/1/2022    John Wilburn tolerated procedure well.         Medications        Problem List         LOS    Acute bacterial sinusitis.  Start antibiotics.  Increase fluid intake.  Call return if fever worsening symptoms.  Lateral epicondylitis.  Injected today.  Ice, rehabilitation exercise discussed.  Counterforce bracing.  Call return if persistent symptoms.  Health maintenance.  Doing well, vaccines current.  Baby aspirin daily.  Discussed health maintenance, screening test, lifestyle modification.  Coronary artery disease.  Stable, followed by cardiology Dr. escobedo.  History of repeat stenting 2015.  Continue risk factor reduction.  Remote history of carotid Dopplers, repeat scheduled.  Hypertension.  Good control.  Hyperlipidemia.  Improved on Crestor.  Keto diet okay, Follow-up recheck fasting labs.  Retinal detachment.  December/2019.  Improved status post surgery.  Followed by ophthalmology.  Lumbar disc disease.  Followed by pain management/neurosurgery, has had repeat imagery..  s/p course epidural, consider radiofrequency ablation.  Prostate screening.  Follow-up check PSA.  GERD.  Stable on PPI.  EGD benign/dilation only 2019.  Colon cancer screening.  Colonoscopy benign 2019.  Repeat eval 10 years.  IRENE.  Stable on CPAP.  Rotator cuff tendinitis.    Right.  Much improved today status post injection.  Rehabilitation exercises discussed.  Consider imaging, Ortho referral if persistent symptoms.  Elevated  fasting blood sugar.  Discussed diet, exercise lifestyle modification.  Recheck 6 months.  Vocal tic.  Mild, infrequent symptoms currently.  Consider guanfacine.  Tremor.  Intermittent, benign exam today.  TSH normal.  Possibly secondary to benign physiologic tremor.  Follow-up recheck.  Consider further eval if worsening symptoms.  OA left shoulder.  Ice, Tylenol, rehabilitation exercise discussed.  Continue as needed nightly muscle relaxant.  Consider imaging if persistent symptoms.  Joint injection declined.  Carpal tunnel.  Start nightly bracing.  Chest pain.  Atypical.  Clinically improved/resolved had benign inpatient work-up.  Followed by cardiology.  COVID-19 vaccine status: Vaccinated.  Recommend booster.    Diagnoses and all orders for this visit:    1. Acute otitis media, unspecified otitis media type (Primary)    2. Left elbow pain  -     methylPREDNISolone acetate (DEPO-medrol) injection 40 mg    3. Class 2 severe obesity due to excess calories with serious comorbidity and body mass index (BMI) of 39.0 to 39.9 in adult (HCC)    4. History of tobacco use    5. Need for influenza vaccination  -     FluLaval/Fluarix/Fluzone >6 Months    Other orders  -     Discontinue: ciprofloxacin (CIPRO) 500 MG tablet; Take 1 tablet by mouth 2 (Two) Times a Day.  Dispense: 20 tablet; Refill: 0              Expected course, medications, and adverse effects discussed.  Call or return if worsening or persistent symptoms.  I wore protective equipment throughout this patient encounter including a mask, gloves, and eye protection.  Hand hygiene was performed before donning protective equipment and after removal when leaving the room. The complete contents of the Assessment and Plan and Data/Lab Results as documented above have been reviewed and addressed by myself with the patient today as part of an ongoing evaluation / treatment plan.  If some of the documentation has been copied from a previous note and is unchanged it  indicates that this problem / plan has been assessed today but is stable from a previous visit and no changes have been recommended.

## 2021-12-06 ENCOUNTER — OFFICE VISIT (OUTPATIENT)
Dept: CARDIOLOGY | Facility: CLINIC | Age: 66
End: 2021-12-06

## 2021-12-06 VITALS
HEIGHT: 67 IN | BODY MASS INDEX: 40.34 KG/M2 | WEIGHT: 257 LBS | OXYGEN SATURATION: 98 % | DIASTOLIC BLOOD PRESSURE: 84 MMHG | SYSTOLIC BLOOD PRESSURE: 143 MMHG | HEART RATE: 73 BPM

## 2021-12-06 DIAGNOSIS — Z98.61 CAD S/P PERCUTANEOUS CORONARY ANGIOPLASTY: ICD-10-CM

## 2021-12-06 DIAGNOSIS — I25.10 CAD S/P PERCUTANEOUS CORONARY ANGIOPLASTY: ICD-10-CM

## 2021-12-06 DIAGNOSIS — E78.5 DYSLIPIDEMIA: Primary | ICD-10-CM

## 2021-12-06 DIAGNOSIS — I10 ESSENTIAL HYPERTENSION: ICD-10-CM

## 2021-12-06 DIAGNOSIS — E66.01 MORBID OBESITY WITH BMI OF 40.0-44.9, ADULT (HCC): ICD-10-CM

## 2021-12-06 PROCEDURE — 99214 OFFICE O/P EST MOD 30 MIN: CPT | Performed by: INTERNAL MEDICINE

## 2021-12-06 NOTE — PROGRESS NOTES
Subjective:     Encounter Date:12/06/2021      Patient ID: John Wilburn is a 66 y.o. male.    Chief Complaint : For CAD, PCI, pretension, dyslipidemia, obesity  History of Present Illness      This is a 66-year with PMH of    #  CAD,h/o MI, multiple PCIs in [place] in the past, NSTEMI and MIGUEL to RCA 6/18/15 and LCX 06/24/2015, cath 7/8/2015 Patent stents in RCA and LCX,Borderline disease in ramus intermedius and moderate disease in proximal LAD    #.  Hypertension,  #  dyslipidemia,  Lipitor intolerant  #.  PLAVIX  RESISTANT  #.  obesity, hyperglycemia with normal hemoglobin A1c, 5.6 on  7/2015  #.   Former smoker quit in 1990  #    positive family history of heart disease in father and mother  #    allergy/intolerance  to Lipitor Darvocet and Vicodin    Here for  follow-up.  Patient  denies any chest pain shortness of breath lightheadedness dizziness loss of consciousness.   Patient  had labs done 10/19/2018 which showed cholesterol 130 triglycerides 77 HDL 40 LDL 71 CMP was unremarkable. Labs from 4/5/2019 revealed normal CMP TSH 3.74 cholesterol 132 HDL 45 LDL 67 triglycerides 114.  Labs from 8/8/2020 reveal CMP with a glucose of 111, calcium of 8.3, cholesterol 149 triglycerides 144 HDL low at 35 LDL 50.  Labs from 1/25/2021 reveal cholesterol 103 triglycerides 90 HDL low 34, LDL 46.  A1c from 8/9/2021 was 5.8.    Patient's arterial blood pressure is 143/84, heart rate 73, O2 sat of 90 % on room air.  Denies any blurred vision headaches.         ASSESSMENT:    # dyslipidemia  #  CAD history of MI and PCI  #  obesity with BMI over 40, glucose intolerance,   # hypertension,      PLAN:  Reviewed lab results with patient.  Reviewed BMI over 40 counseled on weight loss diet and exercise.  To help with obesity as well as low HDL.  Will continue statin to Crestor 10 mg  Continue aspirin, carvedilol,  as tolerated  Counseled on walking exercise for low HDL.  Patient's BMI is over 40 would benefit from weight loss  diet exercise  Discussed about COVID-19 vaccination patient already took both the doses.          Procedures EKG done 7/28/2021 reviewed/interpreted by me reveals sinus rhythm with rate of 66 bpm with no acute ST-T changes    The following portions of the patient's history were reviewed and updated as appropriate: allergies, current medications, past family history, past medical history, past social history, past surgical history and problem list.    Assessment:         [facility]     Diagnosis Plan   1. Dyslipidemia     2. CAD S/P percutaneous coronary angioplasty     3. Essential hypertension     4. Morbid obesity with BMI of 40.0-44.9, adult ([facility])            Plan:               Past Medical History:  Past Medical History:   Diagnosis Date   • Acute bronchitis    • Acute myocardial infarction, subsequent episode of care ([facility])    • Acute non-recurrent frontal sinusitis    • Acute right-sided low back pain with right-sided sciatica     Impression: persistent, long standing back pain, worse, with rle radiculopathy, h/o vertebral fx, refractory to pt xray with severe degenerative changes l5/ s1 check mri, referrall to dr amarilys arias 20 minutes bid nsaids Rehabilitation exercises discussed. continue pt   • Allergic rhinitis    • Allergy to poison ivy     Impression: Due to the wide spread rash he was started on Prednisone per pt request. He was advised to RTC if his symptoms did not improve.   • Annual visit for general adult medical examination with abnormal findings     Impression: doing well, vaccines current Age specific anticipatory guidance and warning signs discussed. Diet, exercise, and lifestyle modification discussed. Safety, seatbelts, and routine screening examinations discussed. Discussed self-examinations.   • Bronchitis    • Cellulitis of buttock     Impression: possibly 2nd insect bite Topical care discussed. Discussed possible necessity of I and D. Call / return if fever, worsening symptoms.   • Chest pain      Impression: atypical, improved / resolved currently possibly 2nd nausea /gi upset /viral uri serial ekg's, troponin's normal followed by cardiology in [place], he reports negative treadmill cardiolyte 12/11 d/c to home, Follow up 2 to 3 days. Follow up with cardiology planned consider repeat stress test if chest pain on exertion, worsening symptoms.   • Colon cancer screening     Impression: has a colonoscopy, will get records   • Conjunctivitis     Impression: viral Topical care discussed. wash hands frequently   • Coronary atherosclerosis     Impression: h/o stent 2003, 2016 followed by meng, had recent neg stress test, will get records h/o normal carptid doppler per her report, will get records continue coreg, statin, effient continue risk factor reduction recommend cardiology Follow up he states h3 will consider   • Detached retina    • GERD (gastroesophageal reflux disease)    • Hematoma     Impression: hand, much improved xray neg for fracture per ed Signs and symptoms of infecton discussed. Call / return if worsening symptoms.   • Hepatitis B     Impression: h/o, recheck levels   • Herpes zoster     Impression: topical care discussed cover with antibiotics Follow up for zostavax   • History of tobacco use    • HTN (hypertension), benign     Impression: good control Discussed low sodium diet, lifestyle modification. Follow up recheck   • Hyperlipidemia     Impression: followed by Roxana improved tolerating crestor rx, ldl to 71, + aggressive ldl target considering cad recheck   • Lumbar disc disease     Impression: followed by Nick Ashton undergoing epidual injxn   • Malaise and fatigue     Impression: check vitamin levels h/o low t do not recommend replacement consider age enedelia under good cobtrol consider wellbutrin Follow up recheck Call / return if worsening symptoms.   • Myalgia     Impression: possibly 2nd crestor, hold x 1 to 2 mos risk/benefit RX discussed, considering restart possibly at lower  dose   • Nevus     Impression: path shows benign lentigo Discussed skin care, use of sun block and protective clothing.   • Obesity    • Obesity, morbid, BMI 40.0-49.9 ([facility])     DOCUMENTATION OF FOLLOW-UP PLAN FOR PATIENT WITH BMI ABOVE NORMAL ()   • Overweight (BMI 25.0-29.9)     Impression: Discussed diet, exercise, lifestyle modification. Follow up [id number]   • Pruritus    • Right hip pain     Impression: check xray   • Screening for depression     Negative Depression Screening (4 or less) ()   • Screening PSA (prostate specific antigen)     Impression: psa normal / damián normal   • Sinusitis, bacterial     Impression: He was prescribed Cipro and Tessalon perles. Increase fluids. Tylenol/motrin for pain or fever. Medication and medication adverse effects discussed. Follow-up 5-7 days for reevaluation if not improved or sooner if needed.   • Skin lesion     Impression: rec resection / derm ref sched   • Sleep apnea, obstructive     Story: on CPAP   • URI (upper respiratory infection)     Impression: Increase fluid intake. Mucinex Call / return if fever, respiratory difficulty, worsening symptoms.   • Vertiginous syndrome and labyrinthine disorder     Impression: Differential diagnosis discussed. Follow-up in 2 weeks if not better. Follow-up sooner for worsening symptoms or for any concerns. Zyrtec as directed.   • Vertigo     Impression: likely secondary to sinusitis with eustachian tube dysfunction, rx in progress ddx includes vestibular neuritis, cover with prednisone Follow up recheck Consider imaging if persistent symptoms.   • Vitamin D deficiency      Past Surgical History:  Past Surgical History:   Procedure Laterality Date   • CATARACT EXTRACTION, BILATERAL Bilateral    • CORONARY STENT PLACEMENT  2003   • EYE SURGERY  10/16/2019    repair detached retina   • NASAL SEPTUM SURGERY        Allergies:  Allergies   Allergen Reactions   • Vicodin [Hydrocodone-Acetaminophen] Hives   • Atorvastatin Hives    • Propoxyphene Hives     Home Meds:  Current Meds:     Current Outpatient Medications:   •  aspirin 325 MG tablet, Take 1 tablet by mouth Daily., Disp: , Rfl:   •  carvedilol (COREG) 25 MG tablet, Take 1 tablet by mouth 2 (Two) Times a Day., Disp: 180 tablet, Rfl: 2  •  cephalexin (KEFLEX) 500 MG capsule, Take 1 capsule by mouth 3 (Three) Times a Day., Disp: 30 capsule, Rfl: 0  •  Cholecalciferol (VITAMIN D) 50 MCG (2000 UT) tablet, Take 1 tablet by mouth 2 (two) times a day., Disp: , Rfl:   •  fluticasone (FLONASE) 50 MCG/ACT nasal spray, 2 sprays into the nostril(s) as directed by provider Daily As Needed for Rhinitis or Allergies., Disp: 16 g, Rfl: 0  •  losartan (COZAAR) 25 MG tablet, Take 1 tablet by mouth Daily., Disp: 90 tablet, Rfl: 1  •  nitroglycerin (NITROLINGUAL) 0.4 MG/SPRAY spray, Place 1 spray under the tongue Every 5 (Five) Minutes As Needed for Chest Pain., Disp: 1 each, Rfl: 1  •  rosuvastatin (CRESTOR) 20 MG tablet, TAKE 1 TABLET BY MOUTH DAILY, Disp: 90 tablet, Rfl: 2  •  cyclobenzaprine (FLEXERIL) 10 MG tablet, TAKE 1 TABLET BY MOUTH AT NIGHT AS NEEDED FOR MUSCLE SPASMS, Disp: 90 tablet, Rfl: 2  Social History:   Social History     Tobacco Use   • Smoking status: Former Smoker     Packs/day: 1.00     Years: 10.00     Pack years: 10.00     Types: Cigarettes     Start date:      Quit date: 1990     Years since quittin.9   • Smokeless tobacco: Never Used   Substance Use Topics   • Alcohol use: Yes      Family History:  Family History   Problem Relation Age of Onset   • Hypertension Mother    • Diabetes Mother    • Heart disease Mother    • Heart attack Father    • Rheumatic fever Father    • No Known Problems Sister    • No Known Problems Brother    • No Known Problems Maternal Aunt    • No Known Problems Maternal Uncle    • No Known Problems Paternal Aunt    • No Known Problems Paternal Uncle    • No Known Problems Maternal Grandmother    • No Known Problems Maternal Grandfather   "  • No Known Problems Paternal Grandmother    • No Known Problems Paternal Grandfather    • No Known Problems Other    • Anemia Neg Hx    • Arrhythmia Neg Hx    • Asthma Neg Hx    • Clotting disorder Neg Hx    • Fainting Neg Hx    • Heart failure Neg Hx    • Hyperlipidemia Neg Hx               Review of Systems   Constitutional: Negative for malaise/fatigue.   Cardiovascular: Negative for chest pain, leg swelling and palpitations.   Respiratory: Negative for shortness of breath.    Skin: Negative for rash.   Neurological: Negative for dizziness, light-headedness and numbness.     All other systems are negative         Objective:     Physical Exam  /84   Pulse 73   Ht 170.2 cm (67\")   Wt 117 kg (257 lb)   SpO2 98%   BMI 40.25 kg/m²   General:  Appears in no acute distress  Eyes: Sclera is anicteric,  conjunctiva is clear   HEENT:  No JVD.  No carotid bruits  Respiratory: Respirations regular and unlabored at rest.  Clear to auscultation  Cardiovascular: S1,S2 Regular rate and rhythm. No murmur, rub or gallop auscultated.   Extremities: No digital clubbing or cyanosis, no edema  Skin: Color pink. Skin warm and dry to touch. No rashes  No xanthoma  Neuro: Alert and awake, no lateralizing deficits appreciated    Lab Reviewed:         Sigifredo Schmidt MD  12/6/2021 14:31 EST      Much of the above report is an electronic transcription/translation of the spoken language to printed text using Dragon Software. As such, the subtleties and finesse of the spoken language may permit erroneous, or at times, nonsensical words or phrases to be inadvertently transcribed; thus changes may be made at a later date to rectify these errors.         "

## 2022-02-11 NOTE — PROGRESS NOTES
Subjective   John Wilburn is a 66 y.o. male.     Chief Complaint   Patient presents with   • Annual Exam   • Blood Sugar Problem   • Hyperlipidemia   • Hypertension       The patient is here: to discuss health maintenance and disease prevention to follow up on multiple medical problems.  Last comprehensive physical was on 2/8/2021.  Previous physical was performed by  Kian Weston MD  Overall has: moderate activity with work/home activities, good appetite, feels well with minor complaints, decreased energy level, is sleeping well and is sleeping poorly. Nutrition: eating a variety of foods. Last tetanus shot was >10 years ago. Patient is doing routine self breast exams:  Patient's last stress test was: 7/29/2021 Patient's last PSA was: 0.3 was on 1/28/2022   Last Completed Colonoscopy          COLORECTAL CANCER SCREENING (COLONOSCOPY - Every 10 Years) Next due on 5/16/2029 05/16/2019   Colonoscopy component of  COLONOSCOPY    05/16/2019  SCANNED - COLONOSCOPY                Patient unable to leave urine     Blood Sugar Problem  This is a recurrent problem. The current episode started more than 1 year ago. The problem has been gradually worsening. Associated symptoms include headaches. Pertinent negatives include no abdominal pain, chest pain, chills, coughing, diaphoresis, fatigue, myalgias, nausea, neck pain or vomiting. Nothing aggravates the symptoms. He has tried nothing for the symptoms.   Hyperlipidemia  This is a chronic problem. The current episode started more than 1 year ago. Exacerbating diseases include diabetes and obesity. Pertinent negatives include no chest pain, focal weakness, leg pain, myalgias or shortness of breath. Current antihyperlipidemic treatment includes statins. The current treatment provides mild improvement of lipids. There are no compliance problems.  Risk factors for coronary artery disease include dyslipidemia, hypertension, male sex and obesity.   Hypertension  This is  a chronic problem. The current episode started more than 1 year ago. The problem is unchanged. Associated symptoms include headaches. Pertinent negatives include no anxiety, chest pain, malaise/fatigue, neck pain, palpitations, shortness of breath or sweats. There are no associated agents to hypertension. Risk factors for coronary artery disease include male gender, dyslipidemia and obesity. Past treatments include lifestyle changes. Current antihypertension treatment includes beta blockers and ACE inhibitors. The current treatment provides moderate improvement. There are no compliance problems.         Recent Hospitalizations:  No hospitalization(s) within the last year..  ccc      I personally reviewed and updated the patient's allergies, medications, problem list, and past medical, surgical, social, and family history. I have reviewed and confirmed the accuracy of the HPI and ROS as documented by the MA/LPN/RN Kian Weston MD    Family History   Problem Relation Age of Onset   • Hypertension Mother    • Diabetes Mother    • Heart disease Mother    • Heart attack Father    • Rheumatic fever Father    • No Known Problems Sister    • No Known Problems Brother    • No Known Problems Maternal Aunt    • No Known Problems Maternal Uncle    • No Known Problems Paternal Aunt    • No Known Problems Paternal Uncle    • No Known Problems Maternal Grandmother    • No Known Problems Maternal Grandfather    • No Known Problems Paternal Grandmother    • No Known Problems Paternal Grandfather    • No Known Problems Other    • Anemia Neg Hx    • Arrhythmia Neg Hx    • Asthma Neg Hx    • Clotting disorder Neg Hx    • Fainting Neg Hx    • Heart failure Neg Hx    • Hyperlipidemia Neg Hx        Social History     Tobacco Use   • Smoking status: Former Smoker     Packs/day: 1.00     Years: 10.00     Pack years: 10.00     Types: Cigarettes     Start date:      Quit date: 1990     Years since quittin.4   • Smokeless  tobacco: Never Used   Vaping Use   • Vaping Use: Never used   Substance Use Topics   • Alcohol use: Yes   • Drug use: No       Past Surgical History:   Procedure Laterality Date   • CATARACT EXTRACTION, BILATERAL Bilateral    • CORONARY STENT PLACEMENT  2003   • EYE SURGERY  10/16/2019    repair detached retina   • NASAL SEPTUM SURGERY         Patient Active Problem List   Diagnosis   • Acute myocardial infarction, subsequent episode of care ([facility])   • Acute right-sided low back pain with right-sided sciatica   • Allergic rhinitis   • Coronary angioplasty status   • Edema   • Encounter for annual general medical examination with abnormal findings in adult   • Colon cancer screening   • Screening PSA (prostate specific antigen)   • GERD (gastroesophageal reflux disease)   • Hepatitis B   • Herpes zoster   • History of tobacco use   • HTN (hypertension), benign   • Hyperlipidemia   • Lumbar disc disease   • Hematoma   • Vertiginous syndrome and labyrinthine disorder   • Myalgia   • Nevus   • Obstructive sleep apnea   • Class 2 severe obesity due to excess calories with serious comorbidity and body mass index (BMI) of 39.0 to 39.9 in adult ([facility])   • Right hip pain   • Vitamin D deficiency   • Tendinitis of right rotator cuff   • Left ear pain   • Unstable angina ([facility])   • Chest pain in adult   • Elevated fasting glucose   • Left serous otitis media   • Coronary artery disease involving native coronary artery of native heart   • Bradycardia   • Left elbow pain   • Screening for malignant neoplasm of colon         Current Outpatient Medications:   •  aspirin 325 MG tablet, Take 1 tablet by mouth Daily., Disp: , Rfl:   •  Cholecalciferol (VITAMIN D) 50 MCG (2000 UT) tablet, Take 1 tablet by mouth 2 (two) times a day., Disp: , Rfl:   •  cyclobenzaprine (FLEXERIL) 10 MG tablet, TAKE 1 TABLET BY MOUTH AT NIGHT AS NEEDED FOR MUSCLE SPASMS, Disp: 90 tablet, Rfl: 2  •  fluticasone (FLONASE) 50 MCG/ACT nasal spray, 2 sprays into  "the nostril(s) as directed by provider Daily As Needed for Rhinitis or Allergies., Disp: 16 g, Rfl: 0  •  losartan (COZAAR) 25 MG tablet, Take 1 tablet by mouth Daily., Disp: 90 tablet, Rfl: 1  •  nitroglycerin (NITROLINGUAL) 0.4 MG/SPRAY spray, Place 1 spray under the tongue Every 5 (Five) Minutes As Needed for Chest Pain., Disp: 1 each, Rfl: 1  •  carvedilol (COREG) 25 MG tablet, TAKE ONE TABLET BY MOUTH TWO TIMES A DAY, Disp: 180 tablet, Rfl: 0  •  cephalexin (KEFLEX) 500 MG capsule, Take 1 capsule by mouth 3 (Three) Times a Day., Disp: 30 capsule, Rfl: 0  •  rosuvastatin (CRESTOR) 20 MG tablet, TAKE ONE TABLET BY MOUTH ONCE DAILY, Disp: 90 tablet, Rfl: 0    Review of Systems   Constitutional: Negative for chills, diaphoresis, fatigue and malaise/fatigue.   HENT: Negative for trouble swallowing and voice change.    Eyes: Negative for visual disturbance.   Respiratory: Negative for cough and shortness of breath.    Cardiovascular: Negative for chest pain and palpitations.   Gastrointestinal: Negative for abdominal pain, nausea and vomiting.   Endocrine: Negative for polydipsia and polyphagia.   Genitourinary: Negative for hematuria.   Musculoskeletal: Negative for myalgias, neck pain and neck stiffness.   Skin: Negative for color change and pallor.   Allergic/Immunologic: Negative for immunocompromised state.   Neurological: Negative for focal weakness, seizures and syncope.   Hematological: Negative for adenopathy.   Psychiatric/Behavioral: Negative for hallucinations, sleep disturbance and suicidal ideas.       I have reviewed and confirmed the accuracy of the ROS as documented by the MA/LPN/RN Kian Weston MD      Objective   /80   Pulse 80   Temp 97.3 °F (36.3 °C)   Resp 18   Ht 170.2 cm (67\")   Wt 120 kg (265 lb)   SpO2 98%   BMI 41.50 kg/m²   BP Readings from Last 3 Encounters:   02/14/22 128/80   12/06/21 143/84   12/03/21 118/78     Wt Readings from Last 3 Encounters:   02/14/22 120 kg (265 " lb)   12/06/21 117 kg (257 lb)   12/03/21 117 kg (258 lb 6.4 oz)     Physical Exam  Constitutional:       Appearance: He is well-developed. He is not diaphoretic.   HENT:      Head: Normocephalic.      Right Ear: Tympanic membrane, ear canal and external ear normal.      Left Ear: Tympanic membrane, ear canal and external ear normal.      Nose: Nose normal.   Eyes:      General: Lids are normal.      Conjunctiva/sclera: Conjunctivae normal.      Pupils: Pupils are equal, round, and reactive to light.   Neck:      Thyroid: No thyromegaly.      Vascular: No carotid bruit or JVD.      Trachea: No tracheal deviation.   Cardiovascular:      Rate and Rhythm: Normal rate and regular rhythm.      Heart sounds: Normal heart sounds. No murmur heard.  No friction rub. No gallop.    Pulmonary:      Effort: Pulmonary effort is normal.      Breath sounds: Normal breath sounds. No stridor. No decreased breath sounds, wheezing or rales.   Abdominal:      General: Bowel sounds are normal. There is no distension.      Palpations: Abdomen is soft. There is no mass.      Tenderness: There is no abdominal tenderness. There is no guarding or rebound.      Hernia: No hernia is present.   Lymphadenopathy:      Head:      Right side of head: No submental, submandibular, tonsillar, preauricular, posterior auricular or occipital adenopathy.      Left side of head: No submental, submandibular, tonsillar, preauricular, posterior auricular or occipital adenopathy.      Cervical: No cervical adenopathy.   Skin:     General: Skin is warm and dry.      Coloration: Skin is not pale.   Neurological:      Mental Status: He is alert and oriented to person, place, and time.      Cranial Nerves: No cranial nerve deficit.      Sensory: No sensory deficit.      Coordination: Coordination normal.      Gait: Gait normal.      Deep Tendon Reflexes: Reflexes are normal and symmetric.         Data / Lab Results:    Hemoglobin A1C   Date Value Ref Range Status    02/14/2022 5.7 % Final   08/09/2021 5.8 % Final   02/08/2021 5.6 % Final        Lab Results   Component Value Date    LDL 69 01/28/2022    LDL 46 01/25/2021    LDL 50 08/08/2020     Lab Results   Component Value Date    CHOL 114 08/08/2020    CHOL 132 04/05/2019    CHOL 130 10/19/2018     Lab Results   Component Value Date    TRIG 178 (H) 01/28/2022    TRIG 90 01/25/2021    TRIG 144 08/08/2020     Lab Results   Component Value Date    HDL 35 (L) 01/28/2022    HDL 39 (L) 01/25/2021    HDL 35 (L) 08/08/2020     Lab Results   Component Value Date    PSA 0.3 01/28/2022    PSA 0.4 07/20/2020    PSA 0.3 04/05/2019     Lab Results   Component Value Date    WBC 7.5 01/28/2022    HGB 14.3 01/28/2022    HCT 42.5 01/28/2022    MCV 88 01/28/2022     01/28/2022     Lab Results   Component Value Date    TSH 3.260 01/28/2022      Lab Results   Component Value Date    GLUCOSE 114 (H) 01/28/2022    BUN 16 01/28/2022    CREATININE 1.13 01/28/2022    EGFRIFNONA 68 01/28/2022    EGFRIFAFRI 79 01/28/2022    BCR 14 01/28/2022    K 4.9 01/28/2022    CO2 24 01/28/2022    CALCIUM 9.4 01/28/2022    PROTENTOTREF 7.0 01/28/2022    ALBUMIN 4.6 01/28/2022    LABIL2 1.9 01/28/2022    AST 30 01/28/2022    ALT 56 (H) 01/28/2022     No results found for: MAT, RF, SEDRATE   No results found for: CRP   No results found for: IRON, TIBC, FERRITIN   Lab Results   Component Value Date    UHSMFPZV03 481 04/03/2018        Age-appropriate Screening Schedule:  Refer to the list below for future screening recommendations based on patient's age, sex and/or medical conditions. Orders for these recommended tests are listed in the plan section. The patient has been provided with a written plan.    Health Maintenance   Topic Date Due   • ZOSTER VACCINE (1 of 2) Never done   • TDAP/TD VACCINES (1 - Tdap) 02/14/2023 (Originally 7/20/1974)   • INFLUENZA VACCINE  10/01/2022   • LIPID PANEL  01/28/2023           Assessment & Plan      Medications        Problem  List         LOS        Physical.  Doing well, vaccines current.  Baby aspirin daily.  Discussed health maintenance, screening test, lifestyle modification.  Coronary artery disease.  Stable, followed by cardiology Dr. escobedo.  History of repeat stenting 2015.  Cardiac stress testing benign 2021.  Continue risk factor reduction.  Remote history of carotid Dopplers, recommend repeat, he declined secondary to cost but states he will consider in future.  Hypertension.  Good control.  Hyperlipidemia.  Improved on Crestor.  Plans to start weight watchers, follow-up recheck fasting labs.  Retinal detachment.  December/2019.  Improved status post surgery.  Followed by ophthalmology.  Lumbar disc disease.  Followed by pain management/neurosurgery, has had repeat imagery..  s/p course epidural, consider radiofrequency ablation.  Prostate screening.  Follow-up check PSA.  GERD.  Stable on PPI.  EGD benign/dilation only 2019.  Colon cancer screening.  Colonoscopy benign 2019.  Repeat eval 10 years.  IRENE.  Stable on CPAP.  Rotator cuff tendinitis.    Right.  Much improved today status post injection.  Rehabilitation exercises discussed.  Consider imaging, Ortho referral if persistent symptoms.  Elevated fasting blood sugar.  Discussed diet, exercise lifestyle modification.  Recheck 6 months.  Vocal tic.  Mild, infrequent symptoms currently.  Consider guanfacine.  Tremor.  Intermittent, benign exam today.  TSH normal.  Possibly secondary to benign physiologic tremor.  Follow-up recheck.  Consider further eval if worsening symptoms.  OA left shoulder.  Ice, Tylenol, rehabilitation exercise discussed.  Continue as needed nightly muscle relaxant.  Consider imaging if persistent symptoms.  Joint injection declined.  Carpal tunnel.  Start nightly bracing.  COVID-19 vaccine status: Vaccinated.  Recommend booster.   Lateral epicondylitis.  Improved today status post injection. Ice, rehabilitation exercise discussed.  Counterforce  bracing.  Call return if persistent symptoms.      Diagnoses and all orders for this visit:    1. Encounter for annual general medical examination with abnormal findings in adult ([facility])    2. HTN (hypertension), benign    3. Mixed hyperlipidemia    4. Elevated fasting glucose  -     POCT Glucose  -     POC Glycosylated Hemoglobin (Hb A1C)    5. Class 2 severe obesity due to excess calories with serious comorbidity and body mass index (BMI) of 39.0 to 39.9 in adult ([facility])  Assessment & Plan:  Patient's (Body mass index is 41.5 kg/m².) indicates that they are morbidly obese (BMI > 40 or > 35 with obesity - related health condition) with health conditions that include hypertension, diabetes mellitus, dyslipidemias and GERD . Weight is unchanged. BMI is is above average; BMI management plan is completed. We discussed portion control and increasing exercise.       6. History of tobacco use    7. Screening for malignant neoplasm of colon    8. Screening PSA (prostate specific antigen)    9. Carotid bruit, unspecified laterality  -     US Carotid Bilateral; Future    10. Coronary artery disease involving native coronary artery of native heart without angina pectoris            Expected course, medications, and adverse effects discussed.  Call or return if worsening or persistent symptoms.  I wore protective equipment throughout this patient encounter including a mask, gloves, and eye protection.  Hand hygiene was performed before donning protective equipment and after removal when leaving the room. The complete contents of the Assessment and Plan and Data / Lab Results as documented above have been reviewed and addressed by myself with the patient today as part of an ongoing evaluation / treatment plan.  If some of the documentation has been copied from a previous note and is unchanged it indicates that this problem / plan has been assessed today but is stable from a previous visit and no changes have been  recommended.

## 2022-02-14 ENCOUNTER — OFFICE VISIT (OUTPATIENT)
Dept: FAMILY MEDICINE CLINIC | Facility: CLINIC | Age: 67
End: 2022-02-14

## 2022-02-14 VITALS
HEIGHT: 67 IN | DIASTOLIC BLOOD PRESSURE: 80 MMHG | HEART RATE: 80 BPM | SYSTOLIC BLOOD PRESSURE: 128 MMHG | OXYGEN SATURATION: 98 % | RESPIRATION RATE: 18 BRPM | WEIGHT: 265 LBS | TEMPERATURE: 97.3 F | BODY MASS INDEX: 41.59 KG/M2

## 2022-02-14 DIAGNOSIS — E78.2 MIXED HYPERLIPIDEMIA: Chronic | ICD-10-CM

## 2022-02-14 DIAGNOSIS — Z87.891 HISTORY OF TOBACCO USE: ICD-10-CM

## 2022-02-14 DIAGNOSIS — E66.01 CLASS 2 SEVERE OBESITY DUE TO EXCESS CALORIES WITH SERIOUS COMORBIDITY AND BODY MASS INDEX (BMI) OF 39.0 TO 39.9 IN ADULT: Chronic | ICD-10-CM

## 2022-02-14 DIAGNOSIS — Z00.01 ENCOUNTER FOR ANNUAL GENERAL MEDICAL EXAMINATION WITH ABNORMAL FINDINGS IN ADULT: Primary | ICD-10-CM

## 2022-02-14 DIAGNOSIS — I10 HTN (HYPERTENSION), BENIGN: Chronic | ICD-10-CM

## 2022-02-14 DIAGNOSIS — I25.10 CORONARY ARTERY DISEASE INVOLVING NATIVE CORONARY ARTERY OF NATIVE HEART WITHOUT ANGINA PECTORIS: ICD-10-CM

## 2022-02-14 DIAGNOSIS — R09.89 CAROTID BRUIT, UNSPECIFIED LATERALITY: ICD-10-CM

## 2022-02-14 DIAGNOSIS — Z12.11 SCREENING FOR MALIGNANT NEOPLASM OF COLON: ICD-10-CM

## 2022-02-14 DIAGNOSIS — Z12.5 SCREENING PSA (PROSTATE SPECIFIC ANTIGEN): ICD-10-CM

## 2022-02-14 DIAGNOSIS — R73.01 ELEVATED FASTING GLUCOSE: ICD-10-CM

## 2022-02-14 LAB
EXPIRATION DATE: NORMAL
GLUCOSE BLDC GLUCOMTR-MCNC: 127 MG/DL (ref 70–130)
HBA1C MFR BLD: 5.7 %
Lab: NORMAL

## 2022-02-14 PROCEDURE — 83036 HEMOGLOBIN GLYCOSYLATED A1C: CPT | Performed by: FAMILY MEDICINE

## 2022-02-14 PROCEDURE — 99213 OFFICE O/P EST LOW 20 MIN: CPT | Performed by: FAMILY MEDICINE

## 2022-02-14 PROCEDURE — 99396 PREV VISIT EST AGE 40-64: CPT | Performed by: FAMILY MEDICINE

## 2022-02-14 PROCEDURE — 82962 GLUCOSE BLOOD TEST: CPT | Performed by: FAMILY MEDICINE

## 2022-05-03 DIAGNOSIS — E78.2 MIXED HYPERLIPIDEMIA: ICD-10-CM

## 2022-05-03 RX ORDER — ROSUVASTATIN CALCIUM 20 MG/1
TABLET, COATED ORAL
Qty: 90 TABLET | Refills: 0 | Status: SHIPPED | OUTPATIENT
Start: 2022-05-03 | End: 2022-08-09

## 2022-06-04 ENCOUNTER — TELEPHONE ENCOUNTER (OUTPATIENT)
Dept: URBAN - METROPOLITAN AREA CLINIC 68 | Facility: CLINIC | Age: 67
End: 2022-06-04

## 2022-06-05 ENCOUNTER — TELEPHONE ENCOUNTER (OUTPATIENT)
Dept: URBAN - METROPOLITAN AREA CLINIC 68 | Facility: CLINIC | Age: 67
End: 2022-06-05

## 2022-06-05 RX ORDER — ASPIRIN 325 MG
TABLET ORAL
Status: ACTIVE | COMMUNITY
Start: 2009-03-26

## 2022-06-05 RX ORDER — CARVEDILOL 25 MG/1
TABLET, FILM COATED ORAL
Status: ACTIVE | COMMUNITY
Start: 2009-03-26

## 2022-06-05 RX ORDER — COLESEVELAM HYDROCHLORIDE 625 MG/1
TABLET, FILM COATED ORAL BID
Status: ACTIVE | COMMUNITY
Start: 2009-03-26

## 2022-06-05 RX ORDER — CLOPIDOGREL BISULFATE 75 MG
TABLET ORAL
Status: ACTIVE | COMMUNITY
Start: 2009-03-26

## 2022-06-05 RX ORDER — OMEPRAZOLE 10 MG/1
CAPSULE, DELAYED RELEASE ORAL
Status: ACTIVE | COMMUNITY
Start: 2009-03-26

## 2022-06-10 DIAGNOSIS — I10 HYPERTENSION, UNSPECIFIED TYPE: ICD-10-CM

## 2022-06-10 RX ORDER — CARVEDILOL 25 MG/1
TABLET ORAL
Qty: 180 TABLET | Refills: 0 | Status: SHIPPED | OUTPATIENT
Start: 2022-06-10 | End: 2022-08-10 | Stop reason: SDUPTHER

## 2022-06-25 ENCOUNTER — TELEPHONE ENCOUNTER (OUTPATIENT)
Age: 67
End: 2022-06-25

## 2022-06-26 ENCOUNTER — TELEPHONE ENCOUNTER (OUTPATIENT)
Age: 67
End: 2022-06-26

## 2022-06-26 RX ORDER — NIACIN 500 MG/1
NIASPAN(    BID ) ACTIVE -HX ENTRY TABLET ORAL BID
Status: ACTIVE | COMMUNITY
Start: 2009-03-26

## 2022-06-26 RX ORDER — OMEPRAZOLE 10 MG/1
CAPSULE, DELAYED RELEASE ORAL
Status: ACTIVE | COMMUNITY
Start: 2009-03-26

## 2022-06-26 RX ORDER — CARVEDILOL 25 MG/1
TABLET, FILM COATED ORAL
Status: ACTIVE | COMMUNITY
Start: 2009-03-26

## 2022-06-26 RX ORDER — ASPIRIN 325 MG
TABLET ORAL
Status: ACTIVE | COMMUNITY
Start: 2009-03-26

## 2022-06-26 RX ORDER — COLESEVELAM HYDROCHLORIDE 625 MG/1
TABLET, FILM COATED ORAL BID
Status: ACTIVE | COMMUNITY
Start: 2009-03-26

## 2022-06-26 RX ORDER — CLOPIDOGREL BISULFATE 75 MG
TABLET ORAL
Status: ACTIVE | COMMUNITY
Start: 2009-03-26

## 2022-06-26 RX ORDER — BISACODYL 5 MG/1
HALFLYTELY & BISACODYL(    TAKE 2 BISACODYL TABLETS @ NOON, START HALFLYTELY BY 4 PM ) ACTIVE -HX ENTRY TABLET, COATED ORAL
Status: ACTIVE | COMMUNITY
Start: 2009-03-26

## 2022-08-09 DIAGNOSIS — E78.2 MIXED HYPERLIPIDEMIA: ICD-10-CM

## 2022-08-09 RX ORDER — ROSUVASTATIN CALCIUM 20 MG/1
TABLET, COATED ORAL
Qty: 90 TABLET | Refills: 0 | Status: SHIPPED | OUTPATIENT
Start: 2022-08-09 | End: 2022-08-29 | Stop reason: SDUPTHER

## 2022-08-10 DIAGNOSIS — I10 HYPERTENSION, UNSPECIFIED TYPE: ICD-10-CM

## 2022-08-10 DIAGNOSIS — E78.2 MIXED HYPERLIPIDEMIA: Primary | ICD-10-CM

## 2022-08-10 RX ORDER — CARVEDILOL 25 MG/1
25 TABLET ORAL 2 TIMES DAILY
Qty: 180 TABLET | Refills: 0 | Status: SHIPPED | OUTPATIENT
Start: 2022-08-10 | End: 2022-08-29 | Stop reason: SDUPTHER

## 2022-08-10 RX ORDER — LOSARTAN POTASSIUM 25 MG/1
25 TABLET ORAL DAILY
Qty: 90 TABLET | Refills: 1 | Status: SHIPPED | OUTPATIENT
Start: 2022-08-10 | End: 2022-08-29 | Stop reason: SDUPTHER

## 2022-08-22 ENCOUNTER — OFFICE VISIT (OUTPATIENT)
Dept: NEUROLOGY | Facility: CLINIC | Age: 67
End: 2022-08-22

## 2022-08-22 VITALS
BODY MASS INDEX: 41.28 KG/M2 | RESPIRATION RATE: 18 BRPM | HEART RATE: 68 BPM | HEIGHT: 67 IN | TEMPERATURE: 98.6 F | WEIGHT: 263 LBS | SYSTOLIC BLOOD PRESSURE: 153 MMHG | DIASTOLIC BLOOD PRESSURE: 80 MMHG

## 2022-08-22 DIAGNOSIS — G47.33 OBSTRUCTIVE SLEEP APNEA: Primary | ICD-10-CM

## 2022-08-22 PROCEDURE — 99212 OFFICE O/P EST SF 10 MIN: CPT | Performed by: NURSE PRACTITIONER

## 2022-08-22 NOTE — PROGRESS NOTES
Sleep medicine follow-up visit    John Wilburn   1955  67 y.o. male   DATE OF SERVICE: 8/22/2022   IRENE f/u patient states he is benefiting from pap therapy, he uses [facility] and goes through [employer] for supplies      SLEEP TESTING HISTORY:    On NPSG at [facility] , 9/18/21 patient had Moderate obstructive sleep apnea syndrome with apnea-hypopnea index of 29.7 per sleep hour, minimum SpO2 of 82%    The compliance data reviewed and the patient is on CPAP Auto  therapy at 7-20  cm/H2O and compliance data indicates excellent compliance with 100% usage for more than 4 hours with an average usage of 7 hours 47 minutes. AHI down to  with CPAP therapy and mean CPAP pressure 12.1cm of water.  The patient's hypersomnia also resolved with [place] Sleepiness Scale score of 0 with CPAP therapy.  The patient feels great and is benefiting from it and is compliant.       EPWORTH SLEEPINESS SCALE  Sitting and reading 0 WatchingTV 0  Sitting, inactive, in a public place 0  As a passenger in a car for 1 hour w/o a break  0  Lying down to rest in the afternoon  0  Sitting and talking to someone  0  Sitting quietly after a lunch  0  In a car, while stopped for traffic or a light  0  Total 0    .ros      Review of Systems  I reviewed and addressed ROS entered by MA.    History of Present Illness the patient states he is here today to get a new mask tubing and filter ordered.  He states that [facility] already has the order and they just need this document sent to them.  He states he feels very good with CPAP and has no problems.  He states his current mask is old and he is having to put it on tighter and knows that a newer mask will be better.    The following portions of the patient's history were reviewed and updated as appropriate: allergies, current medications, past family history, past medical history, past social history, past surgical history and problem list.      Family History   Problem Relation Age of Onset   • Hypertension Mother     • Diabetes Mother    • Heart disease Mother    • Heart attack Father    • Rheumatic fever Father    • No Known Problems Sister    • No Known Problems Brother    • No Known Problems Maternal Aunt    • No Known Problems Maternal Uncle    • No Known Problems Paternal Aunt    • No Known Problems Paternal Uncle    • No Known Problems Maternal Grandmother    • No Known Problems Maternal Grandfather    • No Known Problems Paternal Grandmother    • No Known Problems Paternal Grandfather    • No Known Problems Other    • Anemia Neg Hx    • Arrhythmia Neg Hx    • Asthma Neg Hx    • Clotting disorder Neg Hx    • Fainting Neg Hx    • Heart failure Neg Hx    • Hyperlipidemia Neg Hx        Past Medical History:   Diagnosis Date   • Acute bronchitis    • Acute myocardial infarction, subsequent episode of care (HCC)    • Acute non-recurrent frontal sinusitis    • Acute right-sided low back pain with right-sided sciatica     Impression: persistent, long standing back pain, worse, with rle radiculopathy, h/o vertebral fx, refractory to pt xray with severe degenerative changes l5/ s1 check mri, referrall to dr amarilys arias 20 minutes bid nsaids Rehabilitation exercises discussed. continue pt   • Allergic rhinitis    • Allergy to poison ivy     Impression: Due to the wide spread rash he was started on Prednisone per pt request. He was advised to RTC if his symptoms did not improve.   • Annual visit for general adult medical examination with abnormal findings     Impression: doing well, vaccines current Age specific anticipatory guidance and warning signs discussed. Diet, exercise, and lifestyle modification discussed. Safety, seatbelts, and routine screening examinations discussed. Discussed self-examinations.   • Bronchitis    • Cellulitis of buttock     Impression: possibly 2nd insect bite Topical care discussed. Discussed possible necessity of I and D. Call / return if fever, worsening symptoms.   • Chest pain     Impression:  atypical, improved / resolved currently possibly 2nd nausea /gi upset /viral uri serial ekg's, troponin's normal followed by cardiology in [place], he reports negative treadmill cardiolyte 12/11 d/c to home, Follow up 2 to 3 days. Follow up with cardiology planned consider repeat stress test if chest pain on exertion, worsening symptoms.   • Colon cancer screening     Impression: has a colonoscopy, will get records   • Conjunctivitis     Impression: viral Topical care discussed. wash hands frequently   • Coronary atherosclerosis     Impression: h/o stent 2003, 2016 followed by [facility], had recent neg stress test, will get records h/o normal carptid doppler per her report, will get records continue coreg, statin, effient continue risk factor reduction recommend cardiology Follow up he states h3 will consider   • Detached retina    • GERD (gastroesophageal reflux disease)    • Hematoma     Impression: hand, much improved xray neg for fracture per ed Signs and symptoms of infecton discussed. Call / return if worsening symptoms.   • Hepatitis B     Impression: h/o, recheck levels   • Herpes zoster     Impression: topical care discussed cover with antibiotics Follow up for zostavax   • History of tobacco use    • HTN (hypertension), benign     Impression: good control Discussed low sodium diet, lifestyle modification. Follow up recheck   • Hyperlipidemia     Impression: followed by Roxana improved tolerating crestor rx, ldl to 71, + aggressive ldl target considering cad recheck   • Lumbar disc disease     Impression: followed by Nick Ashton undergoing epidual injxn   • Malaise and fatigue     Impression: check vitamin levels h/o low t do not recommend replacement consider age enedelia under good cobtrol consider wellbutrin Follow up recheck Call / return if worsening symptoms.   • Myalgia     Impression: possibly 2nd crestor, hold x 1 to 2 mos risk/benefit RX discussed, considering restart possibly at lower dose   • Nevus      Impression: path shows benign lentigo Discussed skin care, use of sun block and protective clothing.   • Obesity    • Obesity, morbid, BMI 40.0-49.9 ([facility])     DOCUMENTATION OF FOLLOW-UP PLAN FOR PATIENT WITH BMI ABOVE NORMAL ()   • Overweight (BMI 25.0-29.9)     Impression: Discussed diet, exercise, lifestyle modification. Follow up [id number]   • Pruritus    • Right hip pain     Impression: check xray   • Screening for depression     Negative Depression Screening (4 or less) ()   • Screening PSA (prostate specific antigen)     Impression: psa normal / damián normal   • Sinusitis, bacterial     Impression: He was prescribed Cipro and Tessalon perles. Increase fluids. Tylenol/motrin for pain or fever. Medication and medication adverse effects discussed. Follow-up 5-7 days for reevaluation if not improved or sooner if needed.   • Skin lesion     Impression: rec resection / derm ref sched   • Sleep apnea, obstructive     Story: on CPAP   • URI (upper respiratory infection)     Impression: Increase fluid intake. Mucinex Call / return if fever, respiratory difficulty, worsening symptoms.   • Vertiginous syndrome and labyrinthine disorder     Impression: Differential diagnosis discussed. Follow-up in 2 weeks if not better. Follow-up sooner for worsening symptoms or for any concerns. Zyrtec as directed.   • Vertigo     Impression: likely secondary to sinusitis with eustachian tube dysfunction, rx in progress ddx includes vestibular neuritis, cover with prednisone Follow up recheck Consider imaging if persistent symptoms.   • Vitamin D deficiency        Social History     Socioeconomic History   • Marital status:    Tobacco Use   • Smoking status: Former Smoker     Packs/day: 1.00     Years: 10.00     Pack years: 10.00     Types: Cigarettes     Start date:      Quit date: 1990     Years since quittin.6   • Smokeless tobacco: Never Used   Vaping Use   • Vaping Use: Never used   Substance and  Sexual Activity   • Alcohol use: Yes   • Drug use: No   • Sexual activity: Defer         Current Outpatient Medications:   •  aspirin 325 MG tablet, Take 1 tablet by mouth Daily., Disp: , Rfl:   •  carvedilol (COREG) 25 MG tablet, Take 1 tablet by mouth 2 (Two) Times a Day., Disp: 180 tablet, Rfl: 0  •  cephalexin (KEFLEX) 500 MG capsule, Take 1 capsule by mouth 3 (Three) Times a Day., Disp: 30 capsule, Rfl: 0  •  Cholecalciferol (VITAMIN D) 50 MCG (2000 UT) tablet, Take 1 tablet by mouth 2 (two) times a day., Disp: , Rfl:   •  cyclobenzaprine (FLEXERIL) 10 MG tablet, TAKE 1 TABLET BY MOUTH AT NIGHT AS NEEDED FOR MUSCLE SPASMS, Disp: 90 tablet, Rfl: 2  •  fluticasone (FLONASE) 50 MCG/ACT nasal spray, 2 sprays into the nostril(s) as directed by provider Daily As Needed for Rhinitis or Allergies., Disp: 16 g, Rfl: 0  •  losartan (COZAAR) 25 MG tablet, Take 1 tablet by mouth Daily., Disp: 90 tablet, Rfl: 1  •  nitroglycerin (NITROLINGUAL) 0.4 MG/SPRAY spray, Place 1 spray under the tongue Every 5 (Five) Minutes As Needed for Chest Pain., Disp: 1 each, Rfl: 1  •  rosuvastatin (CRESTOR) 20 MG tablet, TAKE ONE TABLET BY MOUTH ONCE DAILY, Disp: 90 tablet, Rfl: 0    Allergies   Allergen Reactions   • Vicodin [Hydrocodone-Acetaminophen] Hives   • Atorvastatin Hives   • Propoxyphene Hives        PHYSICAL EXAMINATION:  Vitals:    08/22/22 1558   BP: 153/80   Pulse: 68   Resp: 18   Temp: 98.6 °F (37 °C)      Body mass index is 41.19 kg/m².       HEENT: Normal.    CARDIAC: Normal.   LUNGS: Clear to auscultation.   EXTREMITIES: No edema.     Diagnoses and all orders for this visit:    1. Obstructive sleep apnea (Primary)    Continue to use the machine a minimum of 4 to 6 hours each and every night.  Call if any questions or concerns.  Mask tubing and filter change at least every 6 months.  Follow-up in 1 year.  The patient was cautioned that obstructive sleep disordered breathing might be aggravated by certain conditions such as  traveling to high altitudes, post anesthetic states or respiratory allergies.  Medications such as sedatives, hypnotics, muscle relaxants, opiate analgesics and or alcohol can aggravate the underlying obstructive sleep disordered breathing. If the patient is significantly drowsy or inattentive during the daytime, should be advised to avoid situations such as driving in which safety could be compromised by impairment of alertness.    Return in about 1 year (around 8/22/2023) for Katharina Moreno.    I spent 16 minutes caring for John on this date of service. This time includes time spent by me in the following activities: reviewing tests, obtaining and/or reviewing a separately obtained history, performing a medically appropriate examination and/or evaluation, counseling and educating the patient/family/caregiver and documenting information in the medical record.      This document has been electronically signed by Katharina TOMAS on August 22, 2022 16:21 EDT

## 2022-08-24 PROBLEM — E66.813 CLASS 3 SEVERE OBESITY DUE TO EXCESS CALORIES WITHOUT SERIOUS COMORBIDITY WITH BODY MASS INDEX (BMI) OF 40.0 TO 44.9 IN ADULT: Status: ACTIVE | Noted: 2019-12-06

## 2022-08-24 NOTE — PROGRESS NOTES
Subjective   John Wilburn is a 67 y.o. male.     Chief Complaint   Patient presents with   • Hypertension   • Hyperlipidemia       Patient states he checks his blood pressure at home and runs around 130/70.     Hyperlipidemia  This is a chronic problem. The current episode started more than 1 year ago. Exacerbating diseases include obesity. Pertinent negatives include no chest pain, focal weakness, leg pain or shortness of breath. Current antihyperlipidemic treatment includes statins. The current treatment provides mild improvement of lipids. There are no compliance problems.  Risk factors for coronary artery disease include dyslipidemia, hypertension, male sex and obesity.   Hypertension  This is a chronic problem. The current episode started more than 1 year ago. The problem is unchanged. Pertinent negatives include no anxiety, chest pain, malaise/fatigue, palpitations, shortness of breath or sweats. There are no associated agents to hypertension. Risk factors for coronary artery disease include male gender, dyslipidemia and obesity. Past treatments include lifestyle changes. Current antihypertension treatment includes beta blockers and ACE inhibitors. The current treatment provides moderate improvement. There are no compliance problems.             I personally reviewed and updated the patient's allergies, medications, problem list, and past medical, surgical, social, and family history. I have reviewed and confirmed the accuracy of the History of Present Illness and Review of Symptoms as documented by the MA/LPN/RN. Kian Weston MD    Family History   Problem Relation Age of Onset   • Hypertension Mother    • Diabetes Mother    • Heart disease Mother    • Heart attack Father    • Rheumatic fever Father    • No Known Problems Sister    • No Known Problems Brother    • No Known Problems Maternal Aunt    • No Known Problems Maternal Uncle    • No Known Problems Paternal Aunt    • No Known Problems Paternal  Uncle    • No Known Problems Maternal Grandmother    • No Known Problems Maternal Grandfather    • No Known Problems Paternal Grandmother    • No Known Problems Paternal Grandfather    • No Known Problems Other    • Anemia Neg Hx    • Arrhythmia Neg Hx    • Asthma Neg Hx    • Clotting disorder Neg Hx    • Fainting Neg Hx    • Heart failure Neg Hx    • Hyperlipidemia Neg Hx        Social History     Tobacco Use   • Smoking status: Former Smoker     Packs/day: 1.00     Years: 10.00     Pack years: 10.00     Types: Cigarettes     Start date:      Quit date: 1990     Years since quittin.6   • Smokeless tobacco: Never Used   Vaping Use   • Vaping Use: Never used   Substance Use Topics   • Alcohol use: Yes   • Drug use: No       Past Surgical History:   Procedure Laterality Date   • CATARACT EXTRACTION, BILATERAL Bilateral    • CORONARY STENT PLACEMENT     • EYE SURGERY  10/16/2019    repair detached retina   • NASAL SEPTUM SURGERY         Patient Active Problem List   Diagnosis   • Acute myocardial infarction, subsequent episode of care ([facility])   • Acute right-sided low back pain with right-sided sciatica   • Allergic rhinitis   • Coronary angioplasty status   • Edema   • Encounter for annual general medical examination with abnormal findings in adult   • Colon cancer screening   • Screening PSA (prostate specific antigen)   • GERD (gastroesophageal reflux disease)   • Hepatitis B   • Herpes zoster   • History of tobacco use   • HTN (hypertension), benign   • Hyperlipidemia   • Lumbar disc disease   • Hematoma   • Vertiginous syndrome and labyrinthine disorder   • Myalgia   • Nevus   • Obstructive sleep apnea   • Class 3 severe obesity due to excess calories without serious comorbidity with body mass index (BMI) of 40.0 to 44.9 in adult ([facility])   • Right hip pain   • Vitamin D deficiency   • Tendinitis of right rotator cuff   • Left ear pain   • Unstable angina ([facility])   • Chest pain in adult   • Elevated  fasting glucose   • Left serous otitis media   • Coronary artery disease involving native coronary artery of native heart   • Bradycardia   • Left elbow pain   • Screening for malignant neoplasm of colon   • History of retinal detachment   • 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         Current Outpatient Medications:   •  aspirin 325 MG tablet, Take 1 tablet by mouth Daily., Disp: , Rfl:   •  carvedilol (COREG) 25 MG tablet, Take 1 tablet by mouth 2 (Two) Times a Day., Disp: 180 tablet, Rfl: 0  •  Cholecalciferol (VITAMIN D) 50 MCG (2000 UT) tablet, Take 1 tablet by mouth 2 (two) times a day., Disp: , Rfl:   •  cyclobenzaprine (FLEXERIL) 10 MG tablet, TAKE 1 TABLET BY MOUTH AT NIGHT AS NEEDED FOR MUSCLE SPASMS, Disp: 90 tablet, Rfl: 2  •  fluticasone (FLONASE) 50 MCG/ACT nasal spray, 2 sprays into the nostril(s) as directed by provider Daily As Needed for Rhinitis or Allergies., Disp: 16 g, Rfl: 0  •  losartan (COZAAR) 25 MG tablet, Take 1 tablet by mouth Daily., Disp: 90 tablet, Rfl: 1  •  nitroglycerin (NITROLINGUAL) 0.4 MG/SPRAY spray, Place 1 spray under the tongue Every 5 (Five) Minutes As Needed for Chest Pain., Disp: 1 each, Rfl: 1  •  rosuvastatin (CRESTOR) 20 MG tablet, TAKE ONE TABLET BY MOUTH ONCE DAILY, Disp: 90 tablet, Rfl: 0          Review of Systems   Constitutional: Negative for chills, diaphoresis and malaise/fatigue.   Eyes: Negative for visual disturbance.   Respiratory: Negative for shortness of breath.    Cardiovascular: Negative for chest pain and palpitations.   Gastrointestinal: Negative for abdominal pain and nausea.   Endocrine: Negative for polydipsia and polyphagia.   Musculoskeletal: Negative for neck stiffness.   Skin: Negative for color change and pallor.   Neurological: Negative for focal weakness, seizures and syncope.   Hematological: Negative for adenopathy.   Psychiatric/Behavioral: Negative for hallucinations and suicidal ideas.       I have reviewed and confirmed the accuracy  "of the ROS as documented by the MA/LPN/RN Kian Weston MD      Objective   /82 (BP Location: Right arm, Patient Position: Sitting, Cuff Size: Adult)   Pulse 88   Temp 98.4 °F (36.9 °C)   Resp 18   Ht 165.1 cm (65\")   Wt 122 kg (270 lb)   SpO2 99%   BMI 44.93 kg/m²   BP Readings from Last 3 Encounters:   08/29/22 132/82   08/22/22 153/80   02/14/22 128/80     Wt Readings from Last 3 Encounters:   08/29/22 122 kg (270 lb)   08/22/22 119 kg (263 lb)   02/14/22 120 kg (265 lb)           Data / Lab Results:    Hemoglobin A1C   Date Value Ref Range Status   02/14/2022 5.7 % Final   08/09/2021 5.8 % Final   02/08/2021 5.6 % Final        Lab Results   Component Value Date    LDL 69 01/28/2022    LDL 46 01/25/2021    LDL 50 08/08/2020     Lab Results   Component Value Date    CHOL 114 08/08/2020    CHOL 132 04/05/2019    CHOL 130 10/19/2018     Lab Results   Component Value Date    TRIG 178 (H) 01/28/2022    TRIG 90 01/25/2021    TRIG 144 08/08/2020     Lab Results   Component Value Date    HDL 35 (L) 01/28/2022    HDL 39 (L) 01/25/2021    HDL 35 (L) 08/08/2020     Lab Results   Component Value Date    PSA 0.3 01/28/2022    PSA 0.4 07/20/2020    PSA 0.3 04/05/2019     Lab Results   Component Value Date    WBC 7.5 01/28/2022    HGB 14.3 01/28/2022    HCT 42.5 01/28/2022    MCV 88 01/28/2022     01/28/2022     Lab Results   Component Value Date    TSH 3.260 01/28/2022      Lab Results   Component Value Date    GLUCOSE 114 (H) 01/28/2022    BUN 16 01/28/2022    CREATININE 1.13 01/28/2022    EGFRIFNONA 68 01/28/2022    EGFRIFAFRI 79 01/28/2022    BCR 14 01/28/2022    K 4.9 01/28/2022    CO2 24 01/28/2022    CALCIUM 9.4 01/28/2022    PROTENTOTREF 7.0 01/28/2022    ALBUMIN 4.6 01/28/2022    LABIL2 1.9 01/28/2022    AST 30 01/28/2022    ALT 56 (H) 01/28/2022     No results found for: MAT, RF, SEDRATE   No results found for: CRP   No results found for: IRON, TIBC, FERRITIN   Lab Results   Component Value Date "    [id number] 481 04/03/2018            Physical Exam  Constitutional:       Appearance: Normal appearance. He is well-developed. He is not diaphoretic.   HENT:      Right Ear: Hearing, tympanic membrane, ear canal and external ear normal.      Left Ear: Hearing, tympanic membrane, ear canal and external ear normal.      Nose: Nose normal. No mucosal edema or congestion.      Right Sinus: No maxillary sinus tenderness or frontal sinus tenderness.      Left Sinus: No maxillary sinus tenderness or frontal sinus tenderness.      Mouth/Throat:      Mouth: No oral lesions.      Pharynx: Uvula midline. No oropharyngeal exudate or posterior oropharyngeal erythema.      Tonsils: No tonsillar exudate.   Cardiovascular:      Rate and Rhythm: Normal rate and regular rhythm.      Pulses: Normal pulses.      Heart sounds: Normal heart sounds, S1 normal and S2 normal. No murmur heard.    No friction rub. No gallop.   Pulmonary:      Effort: Pulmonary effort is normal. No accessory muscle usage.      Breath sounds: Normal breath sounds. No stridor. No decreased breath sounds, wheezing, rhonchi or rales.   Abdominal:      General: Bowel sounds are normal. There is no distension.      Palpations: Abdomen is soft. Abdomen is not rigid. There is no mass or pulsatile mass.      Tenderness: There is no abdominal tenderness. There is no guarding or rebound. Negative signs include Khanna's sign.      Hernia: No hernia is present.   Musculoskeletal:      Right shoulder: Normal. No swelling, deformity, effusion or crepitus. Normal range of motion. Normal strength.      Left shoulder: Normal. No swelling, deformity, effusion, tenderness or crepitus. Normal range of motion. Normal strength.      Cervical back: No swelling, deformity, spasms or tenderness.   Skin:     General: Skin is warm and dry.      Coloration: Skin is not pale.   Neurological:      Mental Status: He is alert and oriented to person, place, and time.      Coordination:  Coordination normal.      Gait: Gait normal.           Assessment & Plan      Medications        Problem List         LOS    Health maintenance.  Doing well, vaccines current.  Baby aspirin daily.  Discussed health maintenance, screening test, lifestyle modification.  Coronary artery disease.  Stable, followed by cardiology Dr. escobedo.  History of repeat stenting 2015.  Cardiac stress testing benign 2021.  Continue risk factor reduction.  Remote history of carotid Dopplers, recommend repeat, he declined secondary to cost but states he will consider in future.  Hypertension.  Good control.  Hyperlipidemia.  Improved on Crestor.  Plans to start weight watchers, follow-up recheck fasting labs.  Retinal detachment.  December/2019.  Improved status post surgery.  Followed by ophthalmology.  Lumbar disc disease.  Followed by pain management/neurosurgery, has had repeat imagery..  s/p course epidural, consider radiofrequency ablation.  Prostate screening.  Follow-up check PSA.  GERD.  Stable on PPI.  EGD benign/dilation only 2019.  Colon cancer screening.  Colonoscopy benign 2019.  Repeat eval 10 years.  IRENE.  Stable on CPAP.  Rotator cuff tendinitis.    Right.  Much improved today status post injection.  Rehabilitation exercises discussed.  Consider imaging, Ortho referral if persistent symptoms.  Elevated fasting blood sugar.  Discussed diet, exercise lifestyle modification.  Recheck 6 months.  Vocal tic.  Mild, infrequent symptoms currently.  Consider guanfacine.  Tremor.  Intermittent, benign exam today.  TSH normal.  Possibly secondary to benign physiologic tremor.  Follow-up recheck.  Consider further eval if worsening symptoms.  OA left shoulder.  Ice, Tylenol, rehabilitation exercise discussed.  Continue as needed nightly muscle relaxant.  Consider imaging if persistent symptoms.  Joint injection declined.  Carpal tunnel.  Improved with nightly bracing.  COVID-19 vaccine status: Vaccinated.  Recommend  booster.   Lateral epicondylitis.  Improved today status post injection. Ice, rehabilitation exercise discussed.  Counterforce bracing.  Call return if persistent symptoms.         Diagnoses and all orders for this visit:    1. Gastroesophageal reflux disease without esophagitis (Primary)    2. Mixed hyperlipidemia    3. Hypertension, unspecified type  -     POCT urinalysis dipstick, manual    4. History of tobacco use    5. Class 3 severe obesity due to excess calories without serious comorbidity with body mass index (BMI) of 40.0 to 44.9 in adult ([facility])  Assessment & Plan:  Patient's (Body mass index is 44.93 kg/m².) indicates that they are morbidly obese (BMI > 40 or > 35 with obesity - related health condition) with health conditions that include hypertension and dyslipidemias . Weight is unchanged. BMI is is above average; BMI management plan is completed. We discussed portion control and increasing exercise.       6. Acute right-sided low back pain with right-sided sciatica    7. HTN (hypertension), benign    8. Coronary artery disease involving native coronary artery of native heart without angina pectoris          Expected course, medications, and adverse effects discussed.  Call or return if worsening or persistent symptoms.  I wore protective equipment throughout this patient encounter including a mask, gloves, and eye protection.  Hand hygiene was performed before donning protective equipment and after removal when leaving the room.  The complete contents of the Assessment and Plan and Data/Labs Results as documented above have been reviewed and addressed by myself with the patient today as part of an ongoing evaluation / treatment plan.  If some of the documentation has been copied from a previous note and is unchanged it indicates that this problem / plan has been assessed today but is stable from a previous visit and no changes have been recommended.

## 2022-08-29 ENCOUNTER — OFFICE VISIT (OUTPATIENT)
Dept: FAMILY MEDICINE CLINIC | Facility: CLINIC | Age: 67
End: 2022-08-29

## 2022-08-29 VITALS
BODY MASS INDEX: 44.98 KG/M2 | WEIGHT: 270 LBS | RESPIRATION RATE: 18 BRPM | TEMPERATURE: 98.4 F | DIASTOLIC BLOOD PRESSURE: 82 MMHG | SYSTOLIC BLOOD PRESSURE: 132 MMHG | HEART RATE: 88 BPM | OXYGEN SATURATION: 99 % | HEIGHT: 65 IN

## 2022-08-29 DIAGNOSIS — I10 HTN (HYPERTENSION), BENIGN: Chronic | ICD-10-CM

## 2022-08-29 DIAGNOSIS — E78.2 MIXED HYPERLIPIDEMIA: Chronic | ICD-10-CM

## 2022-08-29 DIAGNOSIS — M54.41 ACUTE RIGHT-SIDED LOW BACK PAIN WITH RIGHT-SIDED SCIATICA: ICD-10-CM

## 2022-08-29 DIAGNOSIS — E66.01 CLASS 3 SEVERE OBESITY DUE TO EXCESS CALORIES WITHOUT SERIOUS COMORBIDITY WITH BODY MASS INDEX (BMI) OF 40.0 TO 44.9 IN ADULT: ICD-10-CM

## 2022-08-29 DIAGNOSIS — I25.10 CORONARY ARTERY DISEASE INVOLVING NATIVE CORONARY ARTERY OF NATIVE HEART WITHOUT ANGINA PECTORIS: ICD-10-CM

## 2022-08-29 DIAGNOSIS — Z87.891 HISTORY OF TOBACCO USE: ICD-10-CM

## 2022-08-29 DIAGNOSIS — K21.9 GASTROESOPHAGEAL REFLUX DISEASE WITHOUT ESOPHAGITIS: Primary | Chronic | ICD-10-CM

## 2022-08-29 DIAGNOSIS — I10 HYPERTENSION, UNSPECIFIED TYPE: ICD-10-CM

## 2022-08-29 PROBLEM — Z86.69 HISTORY OF RETINAL DETACHMENT: Status: ACTIVE | Noted: 2020-03-04

## 2022-08-29 PROBLEM — H33.311 HORSESHOE TEAR OF RETINA OF RIGHT EYE: Status: ACTIVE | Noted: 2020-03-05

## 2022-08-29 LAB
BILIRUB BLD-MCNC: NEGATIVE MG/DL
CLARITY, POC: CLEAR
COLOR UR: YELLOW
GLUCOSE UR STRIP-MCNC: NEGATIVE MG/DL
KETONES UR QL: NEGATIVE
LEUKOCYTE EST, POC: NEGATIVE
NITRITE UR-MCNC: NEGATIVE MG/ML
PH UR: 6 [PH] (ref 5–8)
PROT UR STRIP-MCNC: NEGATIVE MG/DL
RBC # UR STRIP: NEGATIVE /UL
SP GR UR: 1.02 (ref 1–1.03)
UROBILINOGEN UR QL: NORMAL

## 2022-08-29 PROCEDURE — 99214 OFFICE O/P EST MOD 30 MIN: CPT | Performed by: FAMILY MEDICINE

## 2022-08-29 PROCEDURE — 81002 URINALYSIS NONAUTO W/O SCOPE: CPT | Performed by: FAMILY MEDICINE

## 2022-08-29 RX ORDER — ROSUVASTATIN CALCIUM 20 MG/1
20 TABLET, COATED ORAL DAILY
Qty: 90 TABLET | Refills: 3 | Status: SHIPPED | OUTPATIENT
Start: 2022-08-29 | End: 2023-01-19 | Stop reason: SDUPTHER

## 2022-08-29 RX ORDER — CARVEDILOL 25 MG/1
25 TABLET ORAL 2 TIMES DAILY
Qty: 180 TABLET | Refills: 1 | Status: SHIPPED | OUTPATIENT
Start: 2022-08-29 | End: 2023-01-19 | Stop reason: SDUPTHER

## 2022-08-29 RX ORDER — LOSARTAN POTASSIUM 25 MG/1
25 TABLET ORAL DAILY
Qty: 90 TABLET | Refills: 1 | Status: SHIPPED | OUTPATIENT
Start: 2022-08-29 | End: 2023-01-19 | Stop reason: SDUPTHER

## 2022-08-29 RX ORDER — CYCLOBENZAPRINE HCL 10 MG
10 TABLET ORAL NIGHTLY PRN
Qty: 90 TABLET | Refills: 2 | Status: SHIPPED | OUTPATIENT
Start: 2022-08-29

## 2022-08-29 RX ORDER — FLUTICASONE PROPIONATE 50 MCG
2 SPRAY, SUSPENSION (ML) NASAL DAILY PRN
Qty: 16 G | Refills: 0 | Status: CANCELLED | OUTPATIENT
Start: 2022-08-29

## 2022-08-29 NOTE — ASSESSMENT & PLAN NOTE
Patient's (Body mass index is 44.93 kg/m².) indicates that they are morbidly obese (BMI > 40 or > 35 with obesity - related health condition) with health conditions that include hypertension and dyslipidemias . Weight is unchanged. BMI is is above average; BMI management plan is completed. We discussed portion control and increasing exercise.

## 2022-09-01 ENCOUNTER — TELEPHONE (OUTPATIENT)
Dept: NEUROLOGY | Facility: CLINIC | Age: 67
End: 2022-09-01

## 2022-09-01 NOTE — TELEPHONE ENCOUNTER
Caller: CHERELLE    Relationship: [facility]    Best call back number: [phone]    Who are you requesting to speak with (clinical staff, provider,  specific staff member):   FADI    What was the call regarding:   CPAP EQUIPMENT PROVIDER CALLING TO CONFIRM IF PT APPT.    PLEASE FAX OV NOTES FROM 8-22-22, SCRIPT FOR THE EQUIPMENT, PT'S LAST SLEEP STUDY.    PLEASE FAX [phone]

## 2023-01-09 NOTE — PROGRESS NOTES
Subjective   John Wilburn is a 67 y.o. male.     Chief Complaint   Patient presents with   • URI       Sinusitis  This is a new problem. The current episode started in the past 7 days. The problem has been gradually worsening since onset. The maximum temperature recorded prior to his arrival was 101 - 101.9 F. His pain is at a severity of 4/10. The pain is mild. Associated symptoms include congestion, coughing, headaches and sneezing. Pertinent negatives include no chills, diaphoresis or shortness of breath. Past treatments include nothing. The treatment provided no relief.            I personally reviewed and updated the patient's allergies, medications, problem list, and past medical, surgical, social, and family history. I have reviewed and confirmed the accuracy of the History of Present Illness and Review of Symptoms as documented by the MA/LPN/RN. Kian Weston MD    Family History   Problem Relation Age of Onset   • Hypertension Mother    • Diabetes Mother    • Heart disease Mother    • Heart attack Father    • Rheumatic fever Father    • No Known Problems Sister    • No Known Problems Brother    • No Known Problems Maternal Aunt    • No Known Problems Maternal Uncle    • No Known Problems Paternal Aunt    • No Known Problems Paternal Uncle    • No Known Problems Maternal Grandmother    • No Known Problems Maternal Grandfather    • No Known Problems Paternal Grandmother    • No Known Problems Paternal Grandfather    • No Known Problems Other    • Anemia Neg Hx    • Arrhythmia Neg Hx    • Asthma Neg Hx    • Clotting disorder Neg Hx    • Fainting Neg Hx    • Heart failure Neg Hx    • Hyperlipidemia Neg Hx        Social History     Tobacco Use   • Smoking status: Former     Packs/day: 1.00     Years: 10.00     Pack years: 10.00     Types: Cigarettes     Start date:      Quit date: 1990     Years since quittin.0   • Smokeless tobacco: Never   Vaping Use   • Vaping Use: Never used   Substance Use  Topics   • Alcohol use: Yes   • Drug use: No       Past Surgical History:   Procedure Laterality Date   • CATARACT EXTRACTION, BILATERAL Bilateral    • CORONARY STENT PLACEMENT  2003   • EYE SURGERY  10/16/2019    repair detached retina   • NASAL SEPTUM SURGERY         Patient Active Problem List   Diagnosis   • Acute myocardial infarction, subsequent episode of care ([facility])   • Acute right-sided low back pain with right-sided sciatica   • Allergic rhinitis   • Coronary angioplasty status   • Edema   • Encounter for annual general medical examination with abnormal findings in adult   • Colon cancer screening   • Screening PSA (prostate specific antigen)   • GERD (gastroesophageal reflux disease)   • Hepatitis B   • Herpes zoster   • History of tobacco use   • HTN (hypertension), benign   • Hyperlipidemia   • Lumbar disc disease   • Hematoma   • Vertiginous syndrome and labyrinthine disorder   • Myalgia   • Nevus   • Obstructive sleep apnea   • Class 3 severe obesity due to excess calories without serious comorbidity with body mass index (BMI) of 40.0 to 44.9 in adult ([facility])   • Right hip pain   • Vitamin D deficiency   • Tendinitis of right rotator cuff   • Left ear pain   • Unstable angina ([facility])   • Chest pain in adult   • Elevated fasting glucose   • Left serous otitis media   • Coronary artery disease involving native coronary artery of native heart   • Bradycardia   • Left elbow pain   • Screening for malignant neoplasm of colon   • History of retinal detachment   • 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         Current Outpatient Medications:   •  aspirin 325 MG tablet, Take 1 tablet by mouth Daily., Disp: , Rfl:   •  carvedilol (COREG) 25 MG tablet, Take 1 tablet by mouth 2 (Two) Times a Day., Disp: 180 tablet, Rfl: 1  •  Cholecalciferol (VITAMIN D) 50 MCG (2000 UT) tablet, Take 1 tablet by mouth 2 (two) times a day., Disp: , Rfl:   •  cyclobenzaprine (FLEXERIL) 10 MG tablet, Take 1 tablet by mouth At Night As  Needed for Muscle Spasms., Disp: 90 tablet, Rfl: 2  •  fluticasone (FLONASE) 50 MCG/ACT nasal spray, 2 sprays into the nostril(s) as directed by provider Daily As Needed for Rhinitis or Allergies., Disp: 16 g, Rfl: 0  •  losartan (COZAAR) 25 MG tablet, Take 1 tablet by mouth Daily., Disp: 90 tablet, Rfl: 1  •  nitroglycerin (NITROLINGUAL) 0.4 MG/SPRAY spray, Place 1 spray under the tongue Every 5 (Five) Minutes As Needed for Chest Pain., Disp: 1 each, Rfl: 1  •  rosuvastatin (CRESTOR) 20 MG tablet, Take 1 tablet by mouth Daily., Disp: 90 tablet, Rfl: 3  •  cephalexin (KEFLEX) 500 MG capsule, Take 1 capsule by mouth 3 (Three) Times a Day., Disp: 30 capsule, Rfl: 0  •  Molnupiravir (LAGEVRIO) 200 MG capsule, Take 4 capsules by mouth Every 12 (Twelve) Hours for 5 days., Disp: 40 capsule, Rfl: 0  •  predniSONE (DELTASONE) 5 MG tablet, 40mg x 3 days, 20mg x 3 days, 10mg x 3 days, 5mg x 3 days, Disp: 45 tablet, Rfl: 0  •  promethazine-codeine (PHENERGAN with CODEINE) 6.25-10 MG/5ML syrup, Take 10 mL nightly as needed.  Has tolerated codeine in the past without problems, Disp: 240 mL, Rfl: 0         Review of Systems   Constitutional: Negative for chills and diaphoresis.   HENT: Positive for congestion and sneezing.    Eyes: Negative for visual disturbance.   Respiratory: Positive for cough. Negative for shortness of breath.    Cardiovascular: Negative for chest pain and palpitations.   Gastrointestinal: Negative for abdominal pain and nausea.   Endocrine: Negative for polydipsia and polyphagia.   Musculoskeletal: Negative for neck stiffness.   Skin: Negative for color change and pallor.   Neurological: Negative for seizures and syncope.   Hematological: Negative for adenopathy.       I have reviewed and confirmed the accuracy of the ROS as documented by the MA/LPN/RN Kian Weston MD      Objective   /80   Pulse 95   Temp 98.5 °F (36.9 °C)   Resp 18   Ht 165.1 cm (65\")   Wt 123 kg (270 lb 6.4 oz)   SpO2 95%    BMI 45.00 kg/m²   BP Readings from Last 3 Encounters:   01/10/23 152/80   08/29/22 132/82   08/22/22 153/80     Wt Readings from Last 3 Encounters:   01/10/23 123 kg (270 lb 6.4 oz)   08/29/22 122 kg (270 lb)   08/22/22 119 kg (263 lb)     Physical Exam  Constitutional:       Appearance: Normal appearance. He is well-developed. He is not diaphoretic.   HENT:      Head: Normocephalic.      Right Ear: Hearing, ear canal and external ear normal. A middle ear effusion is present. Tympanic membrane is erythematous.      Left Ear: Hearing, ear canal and external ear normal. A middle ear effusion is present. Tympanic membrane is erythematous.      Nose: Congestion present.      Right Sinus: Maxillary sinus tenderness and frontal sinus tenderness present.      Left Sinus: Maxillary sinus tenderness and frontal sinus tenderness present.      Mouth/Throat:      Pharynx: Posterior oropharyngeal erythema present.      Tonsils: No tonsillar abscesses. 1+ on the right. 1+ on the left.   Eyes:      General: Lids are normal.      Conjunctiva/sclera: Conjunctivae normal.      Pupils: Pupils are equal, round, and reactive to light.   Neck:      Meningeal: Brudzinski's sign and Kernig's sign absent.   Cardiovascular:      Rate and Rhythm: Normal rate and regular rhythm.      Pulses: Normal pulses.      Heart sounds: Normal heart sounds, S1 normal and S2 normal. No murmur heard.    No friction rub. No gallop.   Pulmonary:      Effort: Pulmonary effort is normal. No accessory muscle usage or respiratory distress.      Breath sounds: No stridor. Examination of the right-upper field reveals wheezing and rhonchi. Examination of the left-upper field reveals wheezing and rhonchi. Examination of the right-middle field reveals wheezing and rhonchi. Examination of the left-middle field reveals wheezing and rhonchi. Examination of the right-lower field reveals wheezing and rhonchi. Examination of the left-lower field reveals wheezing and  rhonchi. Wheezing and rhonchi present. No decreased breath sounds or rales.   Abdominal:      General: Bowel sounds are normal. There is no distension.      Palpations: Abdomen is soft. There is no mass.      Tenderness: There is no abdominal tenderness.      Hernia: No hernia is present.   Skin:     General: Skin is warm and dry.      Coloration: Skin is not pale.   Neurological:      Mental Status: He is alert and oriented to person, place, and time.      Cranial Nerves: No cranial nerve deficit.      Coordination: Coordination normal.      Gait: Gait normal.         Data / Lab Results:    Hemoglobin A1C   Date Value Ref Range Status   02/14/2022 5.7 % Final   08/09/2021 5.8 % Final   02/08/2021 5.6 % Final        Lab Results   Component Value Date    LDL 69 01/28/2022    LDL 46 01/25/2021    LDL 50 08/08/2020     Lab Results   Component Value Date    CHOL 114 08/08/2020    CHOL 132 04/05/2019    CHOL 130 10/19/2018     Lab Results   Component Value Date    TRIG 178 (H) 01/28/2022    TRIG 90 01/25/2021    TRIG 144 08/08/2020     Lab Results   Component Value Date    HDL 35 (L) 01/28/2022    HDL 39 (L) 01/25/2021    HDL 35 (L) 08/08/2020     Lab Results   Component Value Date    PSA 0.3 01/28/2022    PSA 0.4 07/20/2020    PSA 0.3 04/05/2019     Lab Results   Component Value Date    WBC 7.5 01/28/2022    HGB 14.3 01/28/2022    HCT 42.5 01/28/2022    MCV 88 01/28/2022     01/28/2022     Lab Results   Component Value Date    TSH 3.260 01/28/2022      Lab Results   Component Value Date    GLUCOSE 114 (H) 01/28/2022    BUN 16 01/28/2022    CREATININE 1.13 01/28/2022    EGFRIFNONA 68 01/28/2022    EGFRIFAFRI 79 01/28/2022    BCR 14 01/28/2022    K 4.9 01/28/2022    CO2 24 01/28/2022    CALCIUM 9.4 01/28/2022    PROTENTOTREF 7.0 01/28/2022    ALBUMIN 4.6 01/28/2022    LABIL2 1.9 01/28/2022    AST 30 01/28/2022    ALT 56 (H) 01/28/2022     No results found for: MAT, RF, SEDRATE   No results found for: CRP   No  results found for: IRON, TIBC, FERRITIN   Lab Results   Component Value Date    XMJHAEUH01 481 04/03/2018          Assessment & Plan      Medications        Problem List         LOS    COVID-19.  With cough, fever, no respiratory difficulty.  Has been vaccinated.  Significant bacterial sinusitis on exam, start antibiotics/prednisone/antivirals.  Continue coated baby aspirin daily.  Quarantine.  Signs symptom progression severe disease discussed, call or return if worsening symptoms.  Health maintenance.  Doing well, vaccines current.  Baby aspirin daily.  Discussed health maintenance, screening test, lifestyle modification.  Coronary artery disease.  Stable, followed by cardiology Dr. escobedo.  History of repeat stenting 2015.  Cardiac stress testing benign 2021.  Continue risk factor reduction.  Remote history of carotid Dopplers, recommend repeat, he declined secondary to cost but states he will consider in future.  Hypertension.  Good control.  Hyperlipidemia.  Improved on Crestor.  Plans to start weight watchers, follow-up recheck fasting labs.  Retinal detachment.  December/2019.  Improved status post surgery.  Followed by ophthalmology.  Lumbar disc disease.  Followed by pain management/neurosurgery, has had repeat imagery..  s/p course epidural, consider radiofrequency ablation.  Prostate screening.  Follow-up check PSA.  GERD.  Stable on PPI.  EGD benign/dilation only 2019.  Colon cancer screening.  Colonoscopy benign 2019.  Repeat eval 10 years.  IRENE.  Stable on CPAP.  Rotator cuff tendinitis.    Right.  Much improved today status post injection.  Rehabilitation exercises discussed.  Consider imaging, Ortho referral if persistent symptoms.  Elevated fasting blood sugar.  Discussed diet, exercise lifestyle modification.  Recheck 6 months.  Vocal tic.  Mild, infrequent symptoms currently.  Consider guanfacine.  Tremor.  Intermittent, benign exam today.  TSH normal.  Possibly secondary to benign physiologic  tremor.  Follow-up recheck.  Consider further eval if worsening symptoms.  OA left shoulder.  Ice, Tylenol, rehabilitation exercise discussed.  Continue as needed nightly muscle relaxant.  Consider imaging if persistent symptoms.  Joint injection declined.  Carpal tunnel.  Improved with nightly bracing.  COVID-19 vaccine status: Vaccinated.  Recommend booster.   Lateral epicondylitis.  Improved today status post injection. Ice, rehabilitation exercise discussed.  Counterforce bracing.  Call return if persistent symptoms.        Diagnoses and all orders for this visit:    1. Acute recurrent sinusitis, unspecified location (Primary)    2. Class 3 severe obesity due to excess calories without serious comorbidity with body mass index (BMI) of 40.0 to 44.9 in adult ([facility])    3. History of tobacco use    4. Upper respiratory tract infection, unspecified type  -     POCT SARS-CoV-2 Antigen LATONIA  -     predniSONE (DELTASONE) 5 MG tablet; 40mg x 3 days, 20mg x 3 days, 10mg x 3 days, 5mg x 3 days  Dispense: 45 tablet; Refill: 0    5. COVID  -     Molnupiravir (LAGEVRIO) 200 MG capsule; Take 4 capsules by mouth Every 12 (Twelve) Hours for 5 days.  Dispense: 40 capsule; Refill: 0    6. Cough, unspecified type  -     cephalexin (KEFLEX) 500 MG capsule; Take 1 capsule by mouth 3 (Three) Times a Day.  Dispense: 30 capsule; Refill: 0  -     promethazine-codeine (PHENERGAN with CODEINE) 6.25-10 MG/5ML syrup; Take 10 mL nightly as needed.  Has tolerated codeine in the past without problems  Dispense: 240 mL; Refill: 0    7. Coronary artery disease involving native coronary artery of native heart without angina pectoris    8. HTN (hypertension), benign              Expected course, medications, and adverse effects discussed.  Call or return if worsening or persistent symptoms.  I wore protective equipment throughout this patient encounter including a mask, gloves, and eye protection.  Hand hygiene was performed before donning protective  equipment and after removal when leaving the room. The complete contents of the Assessment and Plan and Data/Lab Results as documented above have been reviewed and addressed by myself with the patient today as part of an ongoing evaluation / treatment plan.  If some of the documentation has been copied from a previous note and is unchanged it indicates that this problem / plan has been assessed today but is stable from a previous visit and no changes have been recommended.

## 2023-01-10 ENCOUNTER — OFFICE VISIT (OUTPATIENT)
Dept: FAMILY MEDICINE CLINIC | Facility: CLINIC | Age: 68
End: 2023-01-10
Payer: MEDICARE

## 2023-01-10 VITALS
RESPIRATION RATE: 18 BRPM | DIASTOLIC BLOOD PRESSURE: 80 MMHG | SYSTOLIC BLOOD PRESSURE: 152 MMHG | TEMPERATURE: 98.5 F | HEART RATE: 95 BPM | BODY MASS INDEX: 45.05 KG/M2 | HEIGHT: 65 IN | OXYGEN SATURATION: 95 % | WEIGHT: 270.4 LBS

## 2023-01-10 DIAGNOSIS — J06.9 UPPER RESPIRATORY TRACT INFECTION, UNSPECIFIED TYPE: ICD-10-CM

## 2023-01-10 DIAGNOSIS — Z87.891 HISTORY OF TOBACCO USE: ICD-10-CM

## 2023-01-10 DIAGNOSIS — E66.01 CLASS 3 SEVERE OBESITY DUE TO EXCESS CALORIES WITHOUT SERIOUS COMORBIDITY WITH BODY MASS INDEX (BMI) OF 40.0 TO 44.9 IN ADULT: ICD-10-CM

## 2023-01-10 DIAGNOSIS — I10 HTN (HYPERTENSION), BENIGN: Chronic | ICD-10-CM

## 2023-01-10 DIAGNOSIS — U07.1 COVID: ICD-10-CM

## 2023-01-10 DIAGNOSIS — R05.9 COUGH, UNSPECIFIED TYPE: ICD-10-CM

## 2023-01-10 DIAGNOSIS — J01.91 ACUTE RECURRENT SINUSITIS, UNSPECIFIED LOCATION: Primary | ICD-10-CM

## 2023-01-10 DIAGNOSIS — I25.10 CORONARY ARTERY DISEASE INVOLVING NATIVE CORONARY ARTERY OF NATIVE HEART WITHOUT ANGINA PECTORIS: ICD-10-CM

## 2023-01-10 LAB
EXPIRATION DATE: ABNORMAL
FLUAV AG UPPER RESP QL IA.RAPID: NOT DETECTED
FLUBV AG UPPER RESP QL IA.RAPID: NOT DETECTED
INTERNAL CONTROL: ABNORMAL
Lab: ABNORMAL
SARS-COV-2 AG UPPER RESP QL IA.RAPID: DETECTED

## 2023-01-10 PROCEDURE — 87428 SARSCOV & INF VIR A&B AG IA: CPT | Performed by: FAMILY MEDICINE

## 2023-01-10 PROCEDURE — 99214 OFFICE O/P EST MOD 30 MIN: CPT | Performed by: FAMILY MEDICINE

## 2023-01-10 RX ORDER — AZITHROMYCIN 250 MG/1
TABLET, FILM COATED ORAL
Qty: 6 TABLET | Refills: 0 | Status: CANCELLED | OUTPATIENT
Start: 2023-01-10

## 2023-01-10 RX ORDER — PROMETHAZINE HYDROCHLORIDE AND CODEINE PHOSPHATE 6.25; 1 MG/5ML; MG/5ML
SYRUP ORAL
Qty: 240 ML | Refills: 0 | Status: SHIPPED | OUTPATIENT
Start: 2023-01-10 | End: 2023-03-06

## 2023-01-10 RX ORDER — PREDNISONE 1 MG/1
TABLET ORAL
Qty: 45 TABLET | Refills: 0 | Status: SHIPPED | OUTPATIENT
Start: 2023-01-10 | End: 2023-03-06

## 2023-01-10 RX ORDER — CEPHALEXIN 500 MG/1
500 CAPSULE ORAL 3 TIMES DAILY
Qty: 30 CAPSULE | Refills: 0 | Status: SHIPPED | OUTPATIENT
Start: 2023-01-10 | End: 2023-03-06

## 2023-01-19 ENCOUNTER — OFFICE VISIT (OUTPATIENT)
Dept: CARDIOLOGY | Facility: CLINIC | Age: 68
End: 2023-01-19
Payer: MEDICARE

## 2023-01-19 VITALS
SYSTOLIC BLOOD PRESSURE: 136 MMHG | HEIGHT: 65 IN | WEIGHT: 266 LBS | BODY MASS INDEX: 44.32 KG/M2 | DIASTOLIC BLOOD PRESSURE: 74 MMHG | HEART RATE: 64 BPM | OXYGEN SATURATION: 96 %

## 2023-01-19 DIAGNOSIS — I10 ESSENTIAL HYPERTENSION: ICD-10-CM

## 2023-01-19 DIAGNOSIS — E74.39 GLUCOSE INTOLERANCE: ICD-10-CM

## 2023-01-19 DIAGNOSIS — E78.5 DYSLIPIDEMIA: ICD-10-CM

## 2023-01-19 DIAGNOSIS — E66.01 MORBID OBESITY WITH BMI OF 40.0-44.9, ADULT: ICD-10-CM

## 2023-01-19 DIAGNOSIS — I25.10 CAD S/P PERCUTANEOUS CORONARY ANGIOPLASTY: Primary | ICD-10-CM

## 2023-01-19 DIAGNOSIS — Z98.61 CAD S/P PERCUTANEOUS CORONARY ANGIOPLASTY: Primary | ICD-10-CM

## 2023-01-19 PROCEDURE — 93000 ELECTROCARDIOGRAM COMPLETE: CPT | Performed by: INTERNAL MEDICINE

## 2023-01-19 PROCEDURE — 99214 OFFICE O/P EST MOD 30 MIN: CPT | Performed by: INTERNAL MEDICINE

## 2023-01-19 RX ORDER — ROSUVASTATIN CALCIUM 20 MG/1
20 TABLET, COATED ORAL DAILY
Qty: 90 TABLET | Refills: 3 | Status: SHIPPED | OUTPATIENT
Start: 2023-01-19

## 2023-01-19 RX ORDER — LOSARTAN POTASSIUM 25 MG/1
25 TABLET ORAL DAILY
Qty: 90 TABLET | Refills: 3 | Status: SHIPPED | OUTPATIENT
Start: 2023-01-19

## 2023-01-19 RX ORDER — NITROGLYCERIN 400 UG/1
1 SPRAY ORAL
Qty: 1 EACH | Refills: 0 | Status: SHIPPED | OUTPATIENT
Start: 2023-01-19

## 2023-01-19 RX ORDER — CARVEDILOL 25 MG/1
25 TABLET ORAL 2 TIMES DAILY
Qty: 180 TABLET | Refills: 3 | Status: SHIPPED | OUTPATIENT
Start: 2023-01-19

## 2023-01-19 RX ORDER — ASPIRIN 81 MG/1
81 TABLET ORAL DAILY
Qty: 90 TABLET | Refills: 3 | Status: SHIPPED | OUTPATIENT
Start: 2023-01-19

## 2023-01-19 NOTE — PROGRESS NOTES
Subjective:     Encounter Date:01/19/2023      Patient ID: John Wilburn is a 67 y.o. male.    Chief Complaint : Follow-up for CAD, PCI, hypertension, dyslipidemia, obesity with BMI of 40    History of Present Illness      Mr. John Wilburn has PMH of    #  CAD,h/o MI, multiple PCIs in [place] in the past, NSTEMI and MIGUEL to RCA 6/18/15 and LCX 06/24/2015, cath 7/8/2015 Patent stents in RCA and LCX,Borderline disease in ramus intermedius and moderate disease in proximal LAD    #.  Hypertension,  #  dyslipidemia,  Lipitor intolerant, tolerating rosuvastatin  #.  PLAVIX  RESISTANT  #.  obesity, hyperglycemia with normal hemoglobin A1c, 5.6 on  7/2015  #.   Former smoker quit in 1990  #    positive family history of heart disease in father and mother  #    allergy/intolerance  to Lipitor Darvocet and Vicodin    Here for  follow-up.  Patient  denies any chest pain shortness of breath lightheadedness dizziness loss of consciousness.   Is feeling tired just getting over COVID infection.    Patient's arterial blood pressure is 136/74, heart rate 64, O2 sat of 96% on room air.  BMI is 44.      Patient  had labs done 10/19/2018 which showed cholesterol 130 triglycerides 77 HDL 40 LDL 71 CMP was unremarkable. Labs from 4/5/2019 revealed normal CMP TSH 3.74 cholesterol 132 HDL 45 LDL 67 triglycerides 114.  Labs from 8/8/2020 reveal CMP with a glucose of 111, calcium of 8.3, cholesterol 149 triglycerides 144 HDL low at 35 LDL 50.  Labs from 1/25/2021 reveal cholesterol 103 triglycerides 90 HDL low 34, LDL 46.  A1c from 8/9/2021 was 5.8.    7/8/2021     ASSESSMENT:    # dyslipidemia  #  CAD history of MI and PCI  #  obesity with BMI over 40, glucose intolerance,   # hypertension,      PLAN:    Reviewed EKG results with patient  Patient is taking 325 aspirin will cut it down to 81.  Reviewed BMI over 40 counseled on weight loss diet and exercise.  To help with obesity as well as low HDL.  Patient was previously intolerant to  Lipitor but is tolerating Crestor okay.  Will continue statin to Crestor 10 mg  Continue aspirin, carvedilol,  as tolerated  Counseled on walking exercise for low HDL.  Patient's BMI is over 40 would benefit from weight loss diet exercise  Advise labs including lipid profile.  Patient has labs done with PMD in February we will get them before next visit.          ECG 12 Lead    Date/Time: 1/19/2023 3:09 PM  Performed by: Sigifredo Schmidt MD  Authorized by: Sigifredo Schmidt MD   Comparison: compared with previous ECG from 7/28/2021  Comparison to previous ECG: EKG done today reviewed/interpreted by me reveals sinus rhythm at the rate of 64 bpm with old inferior wall MI, no new change compared EKG from 7/28/2021              Copied text in this portion of the note has been reviewed and is accurate as of 1/19/2023  The following portions of the patient's history were reviewed and updated as appropriate: allergies, current medications, past family history, past medical history, past social history, past surgical history and problem list.    Assessment:         [facility]     Diagnosis Plan   1. CAD S/P percutaneous coronary angioplasty        2. Essential hypertension  carvedilol (COREG) 25 MG tablet      3. Dyslipidemia  losartan (COZAAR) 25 MG tablet    rosuvastatin (CRESTOR) 20 MG tablet      4. Glucose intolerance  nitroglycerin (NITROLINGUAL) 0.4 MG/SPRAY spray      5. Morbid obesity with BMI of 40.0-44.9, adult ([facility])               Plan:               Past Medical History:  Past Medical History:   Diagnosis Date   • Acute bronchitis    • Acute myocardial infarction, subsequent episode of care ([facility])    • Acute non-recurrent frontal sinusitis    • Acute right-sided low back pain with right-sided sciatica     Impression: persistent, long standing back pain, worse, with rle radiculopathy, h/o vertebral fx, refractory to pt xray with severe degenerative changes l5/ s1 check mri, referrall to dr amarilys arias 20  minutes bid nsaids Rehabilitation exercises discussed. continue pt   • Allergic rhinitis    • Allergy to poison ivy     Impression: Due to the wide spread rash he was started on Prednisone per pt request. He was advised to RTC if his symptoms did not improve.   • Annual visit for general adult medical examination with abnormal findings     Impression: doing well, vaccines current Age specific anticipatory guidance and warning signs discussed. Diet, exercise, and lifestyle modification discussed. Safety, seatbelts, and routine screening examinations discussed. Discussed self-examinations.   • Bronchitis    • Cellulitis of buttock     Impression: possibly 2nd insect bite Topical care discussed. Discussed possible necessity of I and D. Call / return if fever, worsening symptoms.   • Chest pain     Impression: atypical, improved / resolved currently possibly 2nd nausea /gi upset /viral uri serial ekg's, troponin's normal followed by cardiology in [place], he reports negative treadmill cardiolyte 12/11 d/c to home, Follow up 2 to 3 days. Follow up with cardiology planned consider repeat stress test if chest pain on exertion, worsening symptoms.   • Colon cancer screening     Impression: has a colonoscopy, will get records   • Conjunctivitis     Impression: viral Topical care discussed. wash hands frequently   • Coronary atherosclerosis     Impression: h/o stent 2003, 2016 followed by meng, had recent neg stress test, will get records h/o normal carptid doppler per her report, will get records continue coreg, statin, effient continue risk factor reduction recommend cardiology Follow up he states h3 will consider   • COVID-19    • Detached retina    • GERD (gastroesophageal reflux disease)    • Hematoma     Impression: hand, much improved xray neg for fracture per ed Signs and symptoms of infecton discussed. Call / return if worsening symptoms.   • Hepatitis B     Impression: h/o, recheck levels   • Herpes zoster      Impression: topical care discussed cover with antibiotics Follow up for zostavax   • History of tobacco use    • HTN (hypertension), benign     Impression: good control Discussed low sodium diet, lifestyle modification. Follow up recheck   • Hyperlipidemia     Impression: followed by Roxana improved tolerating crestor rx, ldl to 71, + aggressive ldl target considering cad recheck   • Lumbar disc disease     Impression: followed by Nick Ashton undergoing epidual injxn   • Malaise and fatigue     Impression: check vitamin levels h/o low t do not recommend replacement consider age enedelia under good cobtrol consider wellbutrin Follow up recheck Call / return if worsening symptoms.   • Myalgia     Impression: possibly 2nd crestor, hold x 1 to 2 mos risk/benefit RX discussed, considering restart possibly at lower dose   • Nevus     Impression: path shows benign lentigo Discussed skin care, use of sun block and protective clothing.   • Obesity    • Obesity, morbid, BMI 40.0-49.9 ([facility])     DOCUMENTATION OF FOLLOW-UP PLAN FOR PATIENT WITH BMI ABOVE NORMAL ()   • Overweight (BMI 25.0-29.9)     Impression: Discussed diet, exercise, lifestyle modification. Follow up [id number]   • Pruritus    • Right hip pain     Impression: check xray   • Screening for depression     Negative Depression Screening (4 or less) ()   • Screening PSA (prostate specific antigen)     Impression: psa normal / damián normal   • Sinusitis, bacterial     Impression: He was prescribed Cipro and Tessalon perles. Increase fluids. Tylenol/motrin for pain or fever. Medication and medication adverse effects discussed. Follow-up 5-7 days for reevaluation if not improved or sooner if needed.   • Skin lesion     Impression: rec resection / derm ref sched   • Sleep apnea, obstructive     Story: on CPAP   • URI (upper respiratory infection)     Impression: Increase fluid intake. Mucinex Call / return if fever, respiratory difficulty, worsening symptoms.   •  Vertiginous syndrome and labyrinthine disorder     Impression: Differential diagnosis discussed. Follow-up in 2 weeks if not better. Follow-up sooner for worsening symptoms or for any concerns. Zyrtec as directed.   • Vertigo     Impression: likely secondary to sinusitis with eustachian tube dysfunction, rx in progress ddx includes vestibular neuritis, cover with prednisone Follow up recheck Consider imaging if persistent symptoms.   • Vitamin D deficiency    Ask him to hold Seskin to hold from today  Past Surgical History:  Past Surgical History:   Procedure Laterality Date   • CATARACT EXTRACTION, BILATERAL Bilateral    • CORONARY STENT PLACEMENT  2003   • EYE SURGERY  10/16/2019    repair detached retina   • NASAL SEPTUM SURGERY        Allergies:  Allergies   Allergen Reactions   • Vicodin [Hydrocodone-Acetaminophen] Hives   • Atorvastatin Hives   • Propoxyphene Hives     Home Meds:  Current Meds:     Current Outpatient Medications:   •  carvedilol (COREG) 25 MG tablet, Take 1 tablet by mouth 2 (Two) Times a Day., Disp: 180 tablet, Rfl: 3  •  cephalexin (KEFLEX) 500 MG capsule, Take 1 capsule by mouth 3 (Three) Times a Day., Disp: 30 capsule, Rfl: 0  •  Cholecalciferol (VITAMIN D) 50 MCG (2000 UT) tablet, Take 1 tablet by mouth 2 (two) times a day., Disp: , Rfl:   •  cyclobenzaprine (FLEXERIL) 10 MG tablet, Take 1 tablet by mouth At Night As Needed for Muscle Spasms., Disp: 90 tablet, Rfl: 2  •  fluticasone (FLONASE) 50 MCG/ACT nasal spray, 2 sprays into the nostril(s) as directed by provider Daily As Needed for Rhinitis or Allergies., Disp: 16 g, Rfl: 0  •  losartan (COZAAR) 25 MG tablet, Take 1 tablet by mouth Daily., Disp: 90 tablet, Rfl: 3  •  nitroglycerin (NITROLINGUAL) 0.4 MG/SPRAY spray, Place 1 spray under the tongue Every 5 (Five) Minutes As Needed for Chest Pain., Disp: 1 each, Rfl: 0  •  predniSONE (DELTASONE) 5 MG tablet, 40mg x 3 days, 20mg x 3 days, 10mg x 3 days, 5mg x 3 days, Disp: 45 tablet,  "Rfl: 0  •  promethazine-codeine (PHENERGAN with CODEINE) 6.25-10 MG/5ML syrup, Take 10 mL nightly as needed.  Has tolerated codeine in the past without problems, Disp: 240 mL, Rfl: 0  •  rosuvastatin (CRESTOR) 20 MG tablet, Take 1 tablet by mouth Daily., Disp: 90 tablet, Rfl: 3  •  aspirin 81 MG EC tablet, Take 1 tablet by mouth Daily., Disp: 90 tablet, Rfl: 3  Social History:   Social History     Tobacco Use   • Smoking status: Former     Packs/day: 1.00     Years: 10.00     Pack years: 10.00     Types: Cigarettes     Start date:      Quit date: 1990     Years since quittin.0   • Smokeless tobacco: Never   Substance Use Topics   • Alcohol use: Yes      Family History:  Family History   Problem Relation Age of Onset   • Hypertension Mother    • Diabetes Mother    • Heart disease Mother    • Heart attack Father    • Rheumatic fever Father    • No Known Problems Sister    • No Known Problems Brother    • No Known Problems Maternal Aunt    • No Known Problems Maternal Uncle    • No Known Problems Paternal Aunt    • No Known Problems Paternal Uncle    • No Known Problems Maternal Grandmother    • No Known Problems Maternal Grandfather    • No Known Problems Paternal Grandmother    • No Known Problems Paternal Grandfather    • No Known Problems Other    • Anemia Neg Hx    • Arrhythmia Neg Hx    • Asthma Neg Hx    • Clotting disorder Neg Hx    • Fainting Neg Hx    • Heart failure Neg Hx    • Hyperlipidemia Neg Hx               Review of Systems   Cardiovascular: Negative for chest pain, leg swelling and palpitations.   Respiratory: Negative for shortness of breath.    Neurological: Negative for dizziness and numbness.     All other systems are negative         Objective:     Physical Exam  /74 (BP Location: Left arm, Patient Position: Sitting, Cuff Size: Large Adult)   Pulse 64   Ht 165.1 cm (65\")   Wt 121 kg (266 lb)   SpO2 96%   BMI 44.26 kg/m²   General:  Appears in no acute distress, pleasant " "obese  Eyes: Sclera is anicteric,  conjunctiva is clear   HEENT:  No JVD.  No carotid bruits  Respiratory: Respirations regular and unlabored at rest.  Clear to auscultation  Cardiovascular: S1,S2 Regular rate and rhythm. No murmur, rub or gallop auscultated.   Extremities: No digital clubbing or cyanosis, no edema  Skin: Color pink. Skin warm and dry to touch. No rashes  No xanthoma  Neuro: Alert and awake.    Lab Reviewed:         Sigifredo Schmidt MD  1/19/2023 15:17 EST      EMR Dragon/Transcription:   \"Dictated utilizing Dragon dictation\".        "

## 2023-03-03 PROBLEM — Z00.00 WELCOME TO MEDICARE PREVENTIVE VISIT: Status: ACTIVE | Noted: 2019-12-06

## 2023-03-03 NOTE — PROGRESS NOTES
Subjective   John Wilburn is a 67 y.o. male.     Chief Complaint   Patient presents with   • Welcome To Medicare   • Hypertension   • Hyperlipidemia       The patient is here: to discuss health maintenance and disease prevention to follow up on multiple medical problems.  Last comprehensive physical was on 2/14/2022.  Previous physical was performed by  Kian Weston MD  Overall has: moderate activity with work/home activities, exercises 2 - 3 times per week, good appetite, feels well with no complaints, decreased energy level, is sleeping well and returned to full activity. Nutrition: appropriate balanced diet, balanced diet and eating a variety of foods. Last tetanus shot was unknown. Patient's last stress test was: 7/29/2021 Patient's last PSA was: 0.3 on 2/23/2023     Last Completed Colonoscopy          COLORECTAL CANCER SCREENING (COLONOSCOPY - Every 10 Years) Next due on 5/16/2029 05/16/2019   Colonoscopy component of  COLONOSCOPY    05/16/2019  SCANNED - COLONOSCOPY                History of Present Illness     Recent Hospitalizations:  No hospitalization(s) within the last year..  ccc    I personally reviewed and updated the patient's allergies, medications, problem list, and past medical, surgical, social, and family history. I have reviewed and confirmed the accuracy of the HPI and ROS as documented by the MA/LPN/RN Tessie Davis MA      Family History   Problem Relation Age of Onset   • Hypertension Mother    • Diabetes Mother    • Heart disease Mother    • Heart attack Father    • Rheumatic fever Father    • No Known Problems Sister    • No Known Problems Brother    • No Known Problems Maternal Aunt    • No Known Problems Maternal Uncle    • No Known Problems Paternal Aunt    • No Known Problems Paternal Uncle    • No Known Problems Maternal Grandmother    • No Known Problems Maternal Grandfather    • No Known Problems Paternal Grandmother    • No Known Problems Paternal Grandfather    • No  Known Problems Other    • Anemia Neg Hx    • Arrhythmia Neg Hx    • Asthma Neg Hx    • Clotting disorder Neg Hx    • Fainting Neg Hx    • Heart failure Neg Hx    • Hyperlipidemia Neg Hx        Social History     Tobacco Use   • Smoking status: Former     Packs/day: 1.00     Years: 10.00     Pack years: 10.00     Types: Cigarettes     Start date:      Quit date: 1990     Years since quittin.1   • Smokeless tobacco: Never   Vaping Use   • Vaping Use: Never used   Substance Use Topics   • Alcohol use: Yes   • Drug use: No       Past Surgical History:   Procedure Laterality Date   • CATARACT EXTRACTION, BILATERAL Bilateral    • CORONARY STENT PLACEMENT     • EYE SURGERY  10/16/2019    repair detached retina   • NASAL SEPTUM SURGERY         Patient Active Problem List   Diagnosis   • Acute myocardial infarction, subsequent episode of care ([facility])   • Acute right-sided low back pain with right-sided sciatica   • Allergic rhinitis   • Coronary angioplasty status   • Edema   • Welcome to Medicare preventive visit   • Colon cancer screening   • Screening PSA (prostate specific antigen)   • GERD (gastroesophageal reflux disease)   • Hepatitis B   • Herpes zoster   • History of tobacco use   • HTN (hypertension), benign   • Hyperlipidemia   • Lumbar disc disease   • Hematoma   • Vertiginous syndrome and labyrinthine disorder   • Myalgia   • Nevus   • Obstructive sleep apnea   • Class 3 severe obesity due to excess calories with serious comorbidity and body mass index (BMI) of 45.0 to 49.9 in adult ([facility])   • Right hip pain   • Vitamin D deficiency   • Tendinitis of right rotator cuff   • Left ear pain   • Unstable angina ([facility])   • Chest pain in adult   • Elevated fasting glucose   • Left serous otitis media   • Coronary artery disease involving native coronary artery of native heart   • Bradycardia   • Left elbow pain   • Screening for malignant neoplasm of colon   • History of retinal detachment   • 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         Current Outpatient Medications:   •  aspirin 81 MG EC tablet, Take 1 tablet by mouth Daily., Disp: 90 tablet, Rfl: 3  •  carvedilol (COREG) 25 MG tablet, Take 1 tablet by mouth 2 (Two) Times a Day., Disp: 180 tablet, Rfl: 3  •  Cholecalciferol (VITAMIN D) 50 MCG (2000 UT) tablet, Take 1 tablet by mouth 2 (two) times a day., Disp: , Rfl:   •  cyclobenzaprine (FLEXERIL) 10 MG tablet, Take 1 tablet by mouth At Night As Needed for Muscle Spasms., Disp: 90 tablet, Rfl: 2  •  fluticasone (FLONASE) 50 MCG/ACT nasal spray, 2 sprays into the nostril(s) as directed by provider Daily As Needed for Rhinitis or Allergies., Disp: 16 g, Rfl: 0  •  losartan (COZAAR) 25 MG tablet, Take 1 tablet by mouth Daily., Disp: 90 tablet, Rfl: 3  •  nitroglycerin (NITROLINGUAL) 0.4 MG/SPRAY spray, Place 1 spray under the tongue Every 5 (Five) Minutes As Needed for Chest Pain., Disp: 1 each, Rfl: 0  •  rosuvastatin (CRESTOR) 20 MG tablet, Take 1 tablet by mouth Daily., Disp: 90 tablet, Rfl: 3      Objective   /80 (BP Location: Right arm, Patient Position: Sitting, Cuff Size: Adult)   Pulse 85   Temp 97.8 °F (36.6 °C)   Resp 18   Ht 165.1 cm (65\")   Wt 124 kg (272 lb 12.8 oz)   SpO2 97%   BMI 45.40 kg/m²   BP Readings from Last 3 Encounters:   03/06/23 142/80   01/19/23 136/74   01/10/23 152/80     Wt Readings from Last 3 Encounters:   03/06/23 124 kg (272 lb 12.8 oz)   01/19/23 121 kg (266 lb)   01/10/23 123 kg (270 lb 6.4 oz)         Age-appropriate Screening Schedule:  Refer to the list below for future screening recommendations based on patient's age, sex and/or medical conditions. Orders for these Eula Lan tests are listed in the plan section. The patient has been provided with a written plan.    Health Maintenance   Topic Date Due   • TDAP/TD VACCINES (1 - Tdap) Never done   • ZOSTER VACCINE (1 of 2) Never done   • Pneumococcal Vaccine 65+ (2 - PCV) 04/12/2020   • COVID-19 Vaccine (3 - " Booster for Pfizer series) 04/14/2021   • INFLUENZA VACCINE  08/01/2022   • AAA SCREEN (ONE-TIME)  Never done   • LIPID PANEL  02/23/2024   • ANNUAL WELLNESS VISIT  03/06/2024   • COLORECTAL CANCER SCREENING  05/16/2029   • HEPATITIS C SCREENING  Completed       Depression Screen:   PHQ-2/PHQ-9 Depression Screening 3/6/2023   Retired PHQ-9 Total Score -   Retired Total Score -   Little Interest or Pleasure in Doing Things 0-->not at all   Feeling Down, Depressed or Hopeless 0-->not at all   PHQ-9: Brief Depression Severity Measure Score 0     I spent more than 16 minutes asking patient questions, counseling and documenting in the chart.    Health Habits and Functional and Cognitive Screening:  Functional & Cognitive Status 3/6/2023   Do you have difficulty preparing food and eating? No   Do you have difficulty bathing yourself, getting dressed or grooming yourself? No   Do you have difficulty using the toilet? No   Do you have difficulty moving around from place to place? No   Do you have trouble with steps or getting out of a bed or a chair? No   Current Diet Well Balanced Diet   Dental Exam Up to date   Eye Exam Up to date   Exercise (times per week) 3 times per week   Current Exercises Include Walking;Yard Work   Do you need help using the phone?  No   Are you deaf or do you have serious difficulty hearing?  No   Do you need help with transportation? No   Do you need help shopping? No   Do you need help preparing meals?  No   Do you need help with housework?  No   Do you need help with laundry? No   Do you need help taking your medications? No   Do you need help managing money? No   Do you ever drive or ride in a car without wearing a seat belt? No   Have you felt unusual stress, anger or loneliness in the last month? No   Who do you live with? Spouse   If you need help, do you have trouble finding someone available to you? No   Do you have difficulty concentrating, remembering or making decisions? No       Does  the patient have evidence of cognitive impairment? No    Advance Care Planning:  ACP discussion was declined by the patient. Patient does not have an advance directive, declines further assistance.     A face-to-face visit was completed today with patient.  Counseling explanation, and discussion of advanced directives was performed.   The last advanced care visit was performed in 2022.  In a near life ending situation, from which he is not expected to recover functionally, and he is not able to speak for him, he does not want life sustaining measures. We discussed feeding tubes, ventilators and cardiac support as well as dialysis.    I spent more than 16 minutes discussing Advanced Care Planning information and documenting in the chart.    Identification of Risk Factors:  Risk factors include: Advance Directive Discussion  Colon Cancer Screening  Dementia/Memory   Fall Risk  Immunizations Discussed/Encouraged (specific immunizations; Tdap, Influenza, Prevnar 20 (Pneumococcal 20-valent conjugate) and COVID19 )  Obesity/Overweight   Prostate Cancer Screening   Urinary Incontinence.    Compared to one year ago, the patient feels his physical health is worse.  Compared to one year ago, the patient feels his mental health is the same.    Patient Self-Management and Personalized Health Advice  The patient has been provided with information about: diet, exercise, weight management, fall prevention, designing advance directives and supplements and preventive services including:   · Annual Wellness Visit (AWV)  · Cardiovascular Disease Screening Tests (may do this order every 5 years in beneficiaries without signs or symptoms of cardiovascular disease)  · Colorectal Cancer Screening, Colonoscopy  · Depression Screening (15 minutes face to face, Code )  · Influenza Vaccine and Administration  · Prostate Cancer Screening .      Assessment & Plan      Medications        Problem List         LOS      Diagnoses and all  orders for this visit:    1. Welcome to Medicare preventive visit (Primary)    2. HTN (hypertension), benign    3. Mixed hyperlipidemia    4. History of tobacco use    5. Class 3 severe obesity due to excess calories with serious comorbidity and body mass index (BMI) of 45.0 to 49.9 in adult ([facility])  Assessment & Plan:  Patient's (Body mass index is 45.4 kg/m².) indicates that they are morbidly/severely obese (BMI > 40 or > 35 with obesity - related health condition) with health conditions that include hypertension and dyslipidemias . Weight is worsening. BMI  is above average; BMI management plan is completed. We discussed portion control and increasing exercise.       6. Colon cancer screening    7. Screening PSA (prostate specific antigen)      Medicare wellness.  Discussed health maintenance, routine immunizations, screening tests, lifestyle modification.

## 2023-03-03 NOTE — PROGRESS NOTES
Subjective   John Wilburn is a 67 y.o. male.     Chief Complaint   Patient presents with   • Welcome To Medicare   • Hypertension   • Hyperlipidemia       History of Present Illness  John had an EKG completed on 1/23/2023, scanned into chart.   Hyperlipidemia  This is a chronic problem. The current episode started more than 1 year ago. Exacerbating diseases include obesity. Pertinent negatives include no chest pain, focal weakness, leg pain or shortness of breath. Current antihyperlipidemic treatment includes statins. The current treatment provides mild improvement of lipids. There are no compliance problems.  Risk factors for coronary artery disease include dyslipidemia, hypertension, male sex and obesity.   Hypertension  This is a chronic problem. The current episode started more than 1 year ago. The problem is unchanged. Pertinent negatives include no anxiety, chest pain, malaise/fatigue, palpitations, shortness of breath or sweats. There are no associated agents to hypertension. Risk factors for coronary artery disease include male gender, dyslipidemia and obesity. Past treatments include lifestyle changes. Current antihypertension treatment includes beta blockers and ACE inhibitors. The current treatment provides moderate improvement. There are no compliance problems.             I personally reviewed and updated the patient's allergies, medications, problem list, and past medical, surgical, social, and family history. I have reviewed and confirmed the accuracy of the History of Present Illness and Review of Symptoms as documented by the MA/LPN/RN. Kian Weston MD    Family History   Problem Relation Age of Onset   • Hypertension Mother    • Diabetes Mother    • Heart disease Mother    • Heart attack Father    • Rheumatic fever Father    • No Known Problems Sister    • No Known Problems Brother    • No Known Problems Maternal Aunt    • No Known Problems Maternal Uncle    • No Known Problems Paternal Aunt     • No Known Problems Paternal Uncle    • No Known Problems Maternal Grandmother    • No Known Problems Maternal Grandfather    • No Known Problems Paternal Grandmother    • No Known Problems Paternal Grandfather    • No Known Problems Other    • Anemia Neg Hx    • Arrhythmia Neg Hx    • Asthma Neg Hx    • Clotting disorder Neg Hx    • Fainting Neg Hx    • Heart failure Neg Hx    • Hyperlipidemia Neg Hx        Social History     Tobacco Use   • Smoking status: Former     Packs/day: 1.00     Years: 10.00     Pack years: 10.00     Types: Cigarettes     Start date:      Quit date: 1990     Years since quittin.1   • Smokeless tobacco: Never   Vaping Use   • Vaping Use: Never used   Substance Use Topics   • Alcohol use: Yes   • Drug use: No       Past Surgical History:   Procedure Laterality Date   • CATARACT EXTRACTION, BILATERAL Bilateral    • CORONARY STENT PLACEMENT     • EYE SURGERY  10/16/2019    repair detached retina   • NASAL SEPTUM SURGERY         Patient Active Problem List   Diagnosis   • Acute myocardial infarction, subsequent episode of care ([facility])   • Acute right-sided low back pain with right-sided sciatica   • Allergic rhinitis   • Coronary angioplasty status   • Edema   • Welcome to Medicare preventive visit   • Colon cancer screening   • Screening PSA (prostate specific antigen)   • GERD (gastroesophageal reflux disease)   • Hepatitis B   • Herpes zoster   • History of tobacco use   • HTN (hypertension), benign   • Hyperlipidemia   • Lumbar disc disease   • Hematoma   • Vertiginous syndrome and labyrinthine disorder   • Myalgia   • Nevus   • Obstructive sleep apnea   • Class 3 severe obesity due to excess calories with serious comorbidity and body mass index (BMI) of 45.0 to 49.9 in adult ([facility])   • Right hip pain   • Vitamin D deficiency   • Tendinitis of right rotator cuff   • Left ear pain   • Unstable angina ([facility])   • Chest pain in adult   • Elevated fasting glucose   • Left serous  otitis media   • Coronary artery disease involving native coronary artery of native heart   • Bradycardia   • Left elbow pain   • Screening for malignant neoplasm of colon   • History of retinal detachment   • 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         Current Outpatient Medications:   •  aspirin 81 MG EC tablet, Take 1 tablet by mouth Daily., Disp: 90 tablet, Rfl: 3  •  carvedilol (COREG) 25 MG tablet, Take 1 tablet by mouth 2 (Two) Times a Day., Disp: 180 tablet, Rfl: 3  •  Cholecalciferol (VITAMIN D) 50 MCG (2000 UT) tablet, Take 1 tablet by mouth 2 (two) times a day., Disp: , Rfl:   •  cyclobenzaprine (FLEXERIL) 10 MG tablet, Take 1 tablet by mouth At Night As Needed for Muscle Spasms., Disp: 90 tablet, Rfl: 2  •  fluticasone (FLONASE) 50 MCG/ACT nasal spray, 2 sprays into the nostril(s) as directed by provider Daily As Needed for Rhinitis or Allergies., Disp: 16 g, Rfl: 0  •  losartan (COZAAR) 25 MG tablet, Take 1 tablet by mouth Daily., Disp: 90 tablet, Rfl: 3  •  nitroglycerin (NITROLINGUAL) 0.4 MG/SPRAY spray, Place 1 spray under the tongue Every 5 (Five) Minutes As Needed for Chest Pain., Disp: 1 each, Rfl: 0  •  rosuvastatin (CRESTOR) 20 MG tablet, Take 1 tablet by mouth Daily., Disp: 90 tablet, Rfl: 3         Review of Systems   Constitutional: Negative for chills, diaphoresis and malaise/fatigue.   HENT: Negative for trouble swallowing and voice change.    Eyes: Negative for visual disturbance.   Respiratory: Negative for shortness of breath.    Cardiovascular: Negative for chest pain and palpitations.   Gastrointestinal: Negative for abdominal pain and nausea.   Endocrine: Negative for polydipsia and polyphagia.   Genitourinary: Negative for hematuria.   Musculoskeletal: Negative for neck stiffness.   Skin: Negative for color change and pallor.   Allergic/Immunologic: Negative for immunocompromised state.   Neurological: Negative for focal weakness, seizures and syncope.   Hematological: Negative for  "adenopathy.   Psychiatric/Behavioral: Negative for hallucinations, sleep disturbance and suicidal ideas.       I have reviewed and confirmed the accuracy of the ROS as documented by the MA/LPN/RN Kian Weston MD      Objective   /80 (BP Location: Right arm, Patient Position: Sitting, Cuff Size: Adult)   Pulse 85   Temp 97.8 °F (36.6 °C)   Resp 18   Ht 165.1 cm (65\")   Wt 124 kg (272 lb 12.8 oz)   SpO2 97%   BMI 45.40 kg/m²   BP Readings from Last 3 Encounters:   03/06/23 142/80   01/19/23 136/74   01/10/23 152/80     Wt Readings from Last 3 Encounters:   03/06/23 124 kg (272 lb 12.8 oz)   01/19/23 121 kg (266 lb)   01/10/23 123 kg (270 lb 6.4 oz)     Physical Exam  Constitutional:       Appearance: He is well-developed. He is not diaphoretic.   HENT:      Head: Normocephalic.      Right Ear: Tympanic membrane, ear canal and external ear normal.      Left Ear: Tympanic membrane, ear canal and external ear normal.      Nose: Nose normal.   Eyes:      General: Lids are normal. No scleral icterus.        Right eye: No foreign body or discharge.         Left eye: No foreign body or discharge.      Extraocular Movements:      Right eye: No nystagmus.      Left eye: No nystagmus.      Conjunctiva/sclera: Conjunctivae normal.      Right eye: Right conjunctiva is not injected. No exudate or hemorrhage.     Left eye: Left conjunctiva is not injected. No exudate or hemorrhage.     Pupils: Pupils are equal, round, and reactive to light.      Funduscopic exam:     Right eye: No hemorrhage, exudate, AV nicking, arteriolar narrowing or papilledema.         Left eye: No hemorrhage, exudate, AV nicking, arteriolar narrowing or papilledema.   Neck:      Thyroid: No thyromegaly.      Vascular: No carotid bruit or JVD.      Trachea: No tracheal deviation.   Cardiovascular:      Rate and Rhythm: Normal rate and regular rhythm.      Heart sounds: Normal heart sounds. No murmur heard.    No friction rub. No gallop. "   Pulmonary:      Effort: Pulmonary effort is normal.      Breath sounds: Normal breath sounds. No stridor. No decreased breath sounds, wheezing or rales.   Abdominal:      General: Bowel sounds are normal. There is no distension.      Palpations: Abdomen is soft. There is no mass.      Tenderness: There is no abdominal tenderness. There is no guarding or rebound.      Hernia: No hernia is present.   Lymphadenopathy:      Head:      Right side of head: No submental, submandibular, tonsillar, preauricular, posterior auricular or occipital adenopathy.      Left side of head: No submental, submandibular, tonsillar, preauricular, posterior auricular or occipital adenopathy.      Cervical: No cervical adenopathy.   Skin:     General: Skin is warm and dry.      Coloration: Skin is not pale.   Neurological:      Mental Status: He is alert and oriented to person, place, and time.      Cranial Nerves: No cranial nerve deficit.      Sensory: No sensory deficit.      Coordination: Coordination normal.      Gait: Gait normal.      Deep Tendon Reflexes: Reflexes are normal and symmetric.         Data / Lab Results:    Hemoglobin A1C   Date Value Ref Range Status   03/06/2023 6.7 % Final   02/14/2022 5.7 % Final   08/09/2021 5.8 % Final        Lab Results   Component Value Date    LDL 64 02/23/2023    LDL 69 01/28/2022    LDL 46 01/25/2021     Lab Results   Component Value Date    CHOL 114 08/08/2020    CHOL 132 04/05/2019    CHOL 130 10/19/2018     Lab Results   Component Value Date    TRIG 100 02/23/2023    TRIG 178 (H) 01/28/2022    TRIG 90 01/25/2021     Lab Results   Component Value Date    HDL 32 (L) 02/23/2023    HDL 35 (L) 01/28/2022    HDL 39 (L) 01/25/2021     Lab Results   Component Value Date    PSA 0.3 02/23/2023    PSA 0.3 01/28/2022    PSA 0.4 07/20/2020     Lab Results   Component Value Date    WBC 7.3 02/23/2023    HGB 14.7 02/23/2023    HCT 43.1 02/23/2023    MCV 87 02/23/2023     02/23/2023     Lab  Results   Component Value Date    TSH 2.670 02/23/2023      Lab Results   Component Value Date    GLUCOSE 132 (H) 02/23/2023    BUN 14 02/23/2023    CREATININE 1.11 02/23/2023    EGFRIFNONA 68 01/28/2022    EGFRIFAFRI 79 01/28/2022    BCR 13 02/23/2023    K 5.4 (H) 02/23/2023    CO2 25 02/23/2023    CALCIUM 9.3 02/23/2023    PROTENTOTREF 6.5 02/23/2023    ALBUMIN 4.3 02/23/2023    LABIL2 2.0 02/23/2023    AST 48 (H) 02/23/2023    ALT 69 (H) 02/23/2023     No results found for: MAT, RF, SEDRATE   No results found for: CRP   No results found for: IRON, TIBC, FERRITIN   Lab Results   Component Value Date    HQTNMUXG20 481 04/03/2018          Assessment & Plan      Medications        Problem List         LOS    Welcome to [facility]. doing well, vaccines current.  Recommend update tetanus vaccine at the pharmacy.  Baby aspirin daily.  Discussed health maintenance, screening test, lifestyle modification.  AAA ultrasound scheduled.  Coronary artery disease.  Stable, followed by cardiology Dr. escobedo.  History of repeat stenting 2015.  Cardiac stress testing benign 2021.  Continue risk factor reduction.  Remote history of carotid Dopplers, recommend repeat, he declined secondary to cost but states he will consider in future.  Hypertension.  Good control.  Mild elevation today states he did not take his medication this morning.  Home blood pressure monitor results reviewed, overall good control.  Hyperlipidemia.  Improved on Crestor.  Plans to start weight watchers, follow-up recheck fasting labs.  Retinal detachment.  December/2019.  Improved status post surgery.  Followed by ophthalmology.  Lumbar disc disease.  Followed by pain management/neurosurgery, has had repeat imagery..  s/p course epidural, consider radiofrequency ablation.  Prostate screening.  Follow-up check PSA.  GERD.  Stable on PPI.  EGD benign/dilation only 2019.  Colon cancer screening.  Colonoscopy benign 2019.  Repeat eval 10 years.  IRENE.  Stable on  CPAP.  Rotator cuff tendinitis.    Right.  Much improved today status post injection.  Rehabilitation exercises discussed.  Consider imaging, Ortho referral if persistent symptoms.  Elevated fasting blood sugar.  Discussed diet, exercise lifestyle modification.  Worse, FBS to 132, A1c to 6.7.  Discussed diet, exercise Celesta modification.  Follow-up 3 to 4 months.  Plan start Rx if persistent elevation.  Vocal tic.  Mild, infrequent symptoms currently.  Consider guanfacine.  Tremor.  Intermittent, benign exam today.  TSH normal.  Possibly secondary to benign physiologic tremor.  Follow-up recheck.  Consider further eval if worsening symptoms.  OA left shoulder.  Ice, Tylenol, rehabilitation exercise discussed.  Continue as needed nightly muscle relaxant.  Consider imaging if persistent symptoms.  Joint injection declined.  Carpal tunnel.  Improved with nightly bracing.  COVID-19 vaccine status: Vaccinated.  Recommend booster.   Lateral epicondylitis.  Improved today status post injection. Ice, rehabilitation exercise discussed.  Counterforce bracing.  Call return if persistent symptoms.  COVID-19.  With cough, fever, no respiratory difficulty.  Has been vaccinated.  Significant bacterial sinusitis on exam, start antibiotics/prednisone/antivirals.  Continue coated baby aspirin daily.  Quarantine.  Signs symptom progression severe disease discussed, call or return if worsening symptoms.      Diagnoses and all orders for this visit:    1. Welcome to Medicare preventive visit (Primary)  -     [place] screen [place]; Future    2. HTN (hypertension), benign    3. Mixed hyperlipidemia    4. History of tobacco use  -     [place]; Future    5. Class 3 severe obesity due to excess calories with serious comorbidity and body mass index (BMI) of 45.0 to 49.9 in adult ([facility])  Assessment & Plan:  Patient's (Body mass index is 45.4 kg/m².) indicates that they are morbidly/severely obese (BMI > 40 or > 35 with obesity -  related health condition) with health conditions that include hypertension and dyslipidemias . Weight is worsening. BMI  is above average; BMI management plan is completed. We discussed portion control and increasing exercise.       6. Colon cancer screening    7. Screening PSA (prostate specific antigen)    8. Elevated fasting blood sugar  -     POC Glycosylated Hemoglobin (Hb A1C)    9. Bilateral carotid bruits  -     [place] Carotid Bilateral; Future    10. Screening for AAA (abdominal aortic aneurysm)  -     [place] screen [place]; Future    11. Coronary artery disease involving native coronary artery of native heart without angina pectoris            Expected course, medications, and adverse effects discussed.  Call or return if worsening or persistent symptoms.  I wore protective equipment throughout this patient encounter including a mask, gloves, and eye protection.  Hand hygiene was performed before donning protective equipment and after removal when leaving the room. The complete contents of the Assessment and Plan and Data/Lab Results as documented above have been reviewed and addressed by myself with the patient today as part of an ongoing evaluation / treatment plan.  If some of the documentation has been copied from a previous note and is unchanged it indicates that this problem / plan has been assessed today but is stable from a previous visit and no changes have been recommended.

## 2023-03-06 ENCOUNTER — OFFICE VISIT (OUTPATIENT)
Dept: FAMILY MEDICINE CLINIC | Facility: CLINIC | Age: 68
End: 2023-03-06
Payer: MEDICARE

## 2023-03-06 VITALS
HEART RATE: 85 BPM | HEIGHT: 65 IN | DIASTOLIC BLOOD PRESSURE: 80 MMHG | BODY MASS INDEX: 45.45 KG/M2 | OXYGEN SATURATION: 97 % | TEMPERATURE: 97.8 F | WEIGHT: 272.8 LBS | SYSTOLIC BLOOD PRESSURE: 142 MMHG | RESPIRATION RATE: 18 BRPM

## 2023-03-06 DIAGNOSIS — Z00.00 WELCOME TO MEDICARE PREVENTIVE VISIT: Primary | ICD-10-CM

## 2023-03-06 DIAGNOSIS — I25.10 CORONARY ARTERY DISEASE INVOLVING NATIVE CORONARY ARTERY OF NATIVE HEART WITHOUT ANGINA PECTORIS: ICD-10-CM

## 2023-03-06 DIAGNOSIS — R09.89 BILATERAL CAROTID BRUITS: ICD-10-CM

## 2023-03-06 DIAGNOSIS — E66.01 CLASS 3 SEVERE OBESITY DUE TO EXCESS CALORIES WITH SERIOUS COMORBIDITY AND BODY MASS INDEX (BMI) OF 45.0 TO 49.9 IN ADULT: ICD-10-CM

## 2023-03-06 DIAGNOSIS — E78.2 MIXED HYPERLIPIDEMIA: Chronic | ICD-10-CM

## 2023-03-06 DIAGNOSIS — Z13.6 SCREENING FOR AAA (ABDOMINAL AORTIC ANEURYSM): ICD-10-CM

## 2023-03-06 DIAGNOSIS — R73.01 ELEVATED FASTING BLOOD SUGAR: ICD-10-CM

## 2023-03-06 DIAGNOSIS — Z12.5 SCREENING PSA (PROSTATE SPECIFIC ANTIGEN): ICD-10-CM

## 2023-03-06 DIAGNOSIS — Z87.891 HISTORY OF TOBACCO USE: ICD-10-CM

## 2023-03-06 DIAGNOSIS — Z12.11 COLON CANCER SCREENING: ICD-10-CM

## 2023-03-06 DIAGNOSIS — I10 HTN (HYPERTENSION), BENIGN: Chronic | ICD-10-CM

## 2023-03-06 LAB
EXPIRATION DATE: NORMAL
HBA1C MFR BLD: 6.7 %
Lab: NORMAL

## 2023-03-06 PROCEDURE — 99497 ADVNCD CARE PLAN 30 MIN: CPT | Performed by: FAMILY MEDICINE

## 2023-03-06 PROCEDURE — 83036 HEMOGLOBIN GLYCOSYLATED A1C: CPT | Performed by: FAMILY MEDICINE

## 2023-03-06 PROCEDURE — G0402 INITIAL PREVENTIVE EXAM: HCPCS | Performed by: FAMILY MEDICINE

## 2023-03-06 PROCEDURE — 3044F HG A1C LEVEL LT 7.0%: CPT | Performed by: FAMILY MEDICINE

## 2023-03-06 PROCEDURE — 1159F MED LIST DOCD IN RCRD: CPT | Performed by: FAMILY MEDICINE

## 2023-03-06 PROCEDURE — 99214 OFFICE O/P EST MOD 30 MIN: CPT | Performed by: FAMILY MEDICINE

## 2023-03-06 NOTE — ASSESSMENT & PLAN NOTE
Patient's (Body mass index is 45.4 kg/m².) indicates that they are morbidly/severely obese (BMI > 40 or > 35 with obesity - related health condition) with health conditions that include hypertension and dyslipidemias . Weight is worsening. BMI  is above average; BMI management plan is completed. We discussed portion control and increasing exercise.

## 2023-04-06 ENCOUNTER — HOSPITAL ENCOUNTER (OUTPATIENT)
Dept: ULTRASOUND IMAGING | Facility: HOSPITAL | Age: 68
Discharge: HOME | End: 2023-04-06
Payer: MEDICARE

## 2023-04-06 DIAGNOSIS — Z00.00 WELCOME TO MEDICARE PREVENTIVE VISIT: ICD-10-CM

## 2023-04-06 DIAGNOSIS — Z13.6 SCREENING FOR AAA (ABDOMINAL AORTIC ANEURYSM): ICD-10-CM

## 2023-04-06 DIAGNOSIS — Z87.891 HISTORY OF TOBACCO USE: ICD-10-CM

## 2023-04-06 DIAGNOSIS — R09.89 BILATERAL CAROTID BRUITS: ICD-10-CM

## 2023-04-06 PROCEDURE — 93880 EXTRACRANIAL BILAT STUDY: CPT

## 2023-04-06 PROCEDURE — 76706 US ABDL AORTA SCREEN AAA: CPT

## 2023-04-12 ENCOUNTER — OFFICE VISIT (OUTPATIENT)
Dept: FAMILY MEDICINE CLINIC | Facility: CLINIC | Age: 68
End: 2023-04-12
Payer: MEDICARE

## 2023-04-12 VITALS
BODY MASS INDEX: 45.15 KG/M2 | DIASTOLIC BLOOD PRESSURE: 77 MMHG | RESPIRATION RATE: 18 BRPM | HEART RATE: 74 BPM | TEMPERATURE: 98.2 F | WEIGHT: 271 LBS | SYSTOLIC BLOOD PRESSURE: 135 MMHG | OXYGEN SATURATION: 95 % | HEIGHT: 65 IN

## 2023-04-12 DIAGNOSIS — H66.002 NON-RECURRENT ACUTE SUPPURATIVE OTITIS MEDIA OF LEFT EAR WITHOUT SPONTANEOUS RUPTURE OF TYMPANIC MEMBRANE: Primary | ICD-10-CM

## 2023-04-12 DIAGNOSIS — J30.1 SEASONAL ALLERGIC RHINITIS DUE TO POLLEN: ICD-10-CM

## 2023-04-12 DIAGNOSIS — H92.03 OTALGIA OF BOTH EARS: ICD-10-CM

## 2023-04-12 DIAGNOSIS — H69.83 ETD (EUSTACHIAN TUBE DYSFUNCTION), BILATERAL: ICD-10-CM

## 2023-04-12 RX ORDER — FLUTICASONE PROPIONATE 50 MCG
SPRAY, SUSPENSION (ML) NASAL
Qty: 18.2 G | Refills: 2 | Status: SHIPPED | OUTPATIENT
Start: 2023-04-12

## 2023-04-12 RX ORDER — AMOXICILLIN 500 MG/1
1000 TABLET, FILM COATED ORAL EVERY 8 HOURS
Qty: 60 TABLET | Refills: 0 | Status: SHIPPED | OUTPATIENT
Start: 2023-04-12 | End: 2023-04-22

## 2023-04-12 NOTE — PROGRESS NOTES
Chief Complaint  Earache    Subjective          John is a 67 y.o. male  who presents to [facility]     Patient Care Team:  Kina Weston MD as PCP - General  Kian Weston MD as PCP - Family Medicine  Sigifredo Schmidt MD as Consulting Physician (Cardiology)     History of Present Illness  John is here today for ear pain    Location: bilateral ear pain  Quality: sharp pain  Severity: moderate  Duration: for 1 week  Timing: constant, getting worse  Context: He saw Dr. Weston on 3/6/23 and was told he had fluid in his ears.  He has allergies and has been outside mowing and picking up sticks.  He is not using his Flonase nasal spray.  Alleviating factors: nothing makes better  Aggravating factors: nothing makes worse  Associated Symptoms: no fever, no dizziness, + decreased hearing        John Wilburn  has a past medical history of Acute bronchitis, Acute myocardial infarction, subsequent episode of care, Acute non-recurrent frontal sinusitis, Acute right-sided low back pain with right-sided sciatica, Allergic rhinitis, Allergy to poison ivy, Annual visit for general adult medical examination with abnormal findings, Bronchitis, Cellulitis of buttock, Chest pain, Colon cancer screening, Conjunctivitis, Coronary atherosclerosis, COVID-19, Detached retina, GERD (gastroesophageal reflux disease), Hematoma, Hepatitis B, Herpes zoster, History of tobacco use, HTN (hypertension), benign, Hyperlipidemia, Lumbar disc disease, Malaise and fatigue, Myalgia, Nevus, Obesity, Obesity, morbid, BMI 40.0-49.9, Overweight (BMI 25.0-29.9), Pruritus, Right hip pain, Screening for depression, Screening PSA (prostate specific antigen), Sinusitis, bacterial, Skin lesion, Sleep apnea, obstructive, URI (upper respiratory infection), Vertiginous syndrome and labyrinthine disorder, Vertigo, and Vitamin D deficiency.      Review of Systems   Constitutional: Negative for fever.   HENT: Positive  for congestion, ear pain and hearing loss (both). Negative for rhinorrhea.    Respiratory: Negative for cough and shortness of breath.    Gastrointestinal: Negative for abdominal pain, diarrhea, nausea and vomiting.   Allergic/Immunologic: Positive for environmental allergies.   Neurological: Negative for dizziness and headaches.        Family History   Problem Relation Age of Onset   • Hypertension Mother    • Diabetes Mother    • Heart disease Mother    • Heart attack Father    • Rheumatic fever Father    • No Known Problems Sister    • No Known Problems Brother    • No Known Problems Maternal Aunt    • No Known Problems Maternal Uncle    • No Known Problems Paternal Aunt    • No Known Problems Paternal Uncle    • No Known Problems Maternal Grandmother    • No Known Problems Maternal Grandfather    • No Known Problems Paternal Grandmother    • No Known Problems Paternal Grandfather    • No Known Problems Other    • Anemia Neg Hx    • Arrhythmia Neg Hx    • Asthma Neg Hx    • Clotting disorder Neg Hx    • Fainting Neg Hx    • Heart failure Neg Hx    • Hyperlipidemia Neg Hx         Past Surgical History:   Procedure Laterality Date   • CATARACT EXTRACTION, BILATERAL Bilateral    • CORONARY STENT PLACEMENT     • EYE SURGERY  10/16/2019    repair detached retina   • NASAL SEPTUM SURGERY          Social History     Socioeconomic History   • Marital status:    Tobacco Use   • Smoking status: Former     Packs/day: 1.00     Years: 10.00     Pack years: 10.00     Types: Cigarettes     Start date:      Quit date: 1990     Years since quittin.3   • Smokeless tobacco: Never   Vaping Use   • Vaping Use: Never used   Substance and Sexual Activity   • Alcohol use: Yes   • Drug use: No   • Sexual activity: Defer        Immunization History   Administered Date(s) Administered   • COVID-19 (PFIZER) PURPLE CAP 2021, 2021   • Flu Vaccine Intradermal Quad 18-64YR 2018   • FluLaval/Fluzone  ">6mos 09/27/2019, 10/12/2020, 12/03/2021   • H1N1 All Forms 02/04/2010   • Influenza, Unspecified 12/04/2018, 10/12/2020, 12/03/2021   • Pneumococcal Polysaccharide (PPSV23) 04/12/2019       Objective       Current Outpatient Medications:   •  aspirin 81 MG EC tablet, Take 1 tablet by mouth Daily., Disp: 90 tablet, Rfl: 3  •  carvedilol (COREG) 25 MG tablet, Take 1 tablet by mouth 2 (Two) Times a Day., Disp: 180 tablet, Rfl: 3  •  Cholecalciferol (VITAMIN D) 50 MCG (2000 UT) tablet, Take 1 tablet by mouth 2 (two) times a day., Disp: , Rfl:   •  cyclobenzaprine (FLEXERIL) 10 MG tablet, Take 1 tablet by mouth At Night As Needed for Muscle Spasms., Disp: 90 tablet, Rfl: 2  •  fluticasone (FLONASE) 50 MCG/ACT nasal spray, 1-2 sprays in each nostril once daily for allergies, Disp: 18.2 g, Rfl: 2  •  losartan (COZAAR) 25 MG tablet, Take 1 tablet by mouth Daily., Disp: 90 tablet, Rfl: 3  •  nitroglycerin (NITROLINGUAL) 0.4 MG/SPRAY spray, Place 1 spray under the tongue Every 5 (Five) Minutes As Needed for Chest Pain., Disp: 1 each, Rfl: 0  •  rosuvastatin (CRESTOR) 20 MG tablet, Take 1 tablet by mouth Daily., Disp: 90 tablet, Rfl: 3  •  amoxicillin (AMOXIL) 500 MG tablet, Take 2 tablets by mouth Every 8 (Eight) Hours for 10 days., Disp: 60 tablet, Rfl: 0    Vital Signs:      /77   Pulse 74   Temp 98.2 °F (36.8 °C) (Infrared)   Resp 18   Ht 165.1 cm (65\")   Wt 123 kg (271 lb)   SpO2 95%   BMI 45.10 kg/m²     Vitals:    04/12/23 1536   BP: 135/77   Pulse: 74   Resp: 18   Temp: 98.2 °F (36.8 °C)   TempSrc: Infrared   SpO2: 95%   Weight: 123 kg (271 lb)   Height: 165.1 cm (65\")      Physical Exam  Vitals reviewed.   Constitutional:       General: He is not in acute distress.     Appearance: Normal appearance. He is obese. He is not ill-appearing.   HENT:      Head: Normocephalic and atraumatic.      Right Ear: Ear canal and external ear normal. A middle ear effusion is present. Tympanic membrane is retracted.      " Left Ear: Ear canal and external ear normal. A middle ear effusion is present. Tympanic membrane is erythematous and retracted.      Nose: Congestion present.      Right Sinus: No maxillary sinus tenderness or frontal sinus tenderness.      Left Sinus: No maxillary sinus tenderness or frontal sinus tenderness.      Mouth/Throat:      Mouth: Mucous membranes are moist.      Pharynx: Oropharynx is clear. No oropharyngeal exudate.   Eyes:      General: No scleral icterus.     Conjunctiva/sclera: Conjunctivae normal.   Cardiovascular:      Rate and Rhythm: Normal rate and regular rhythm.      Heart sounds: Normal heart sounds.   Pulmonary:      Effort: Pulmonary effort is normal. No respiratory distress.      Breath sounds: Normal breath sounds. No wheezing.   Musculoskeletal:      Cervical back: Neck supple.      Right lower leg: No edema.      Left lower leg: No edema.   Lymphadenopathy:      Cervical: No cervical adenopathy.   Skin:     General: Skin is warm and dry.   Neurological:      Mental Status: He is alert and oriented to person, place, and time.   Psychiatric:         Mood and Affect: Mood normal.        Result Review :                   Assessment and Plan    Diagnoses and all orders for this visit:    1. Non-recurrent acute suppurative otitis media of left ear without spontaneous rupture of tympanic membrane (Primary)  -     amoxicillin (AMOXIL) 500 MG tablet; Take 2 tablets by mouth Every 8 (Eight) Hours for 10 days.  Dispense: 60 tablet; Refill: 0    2. ETD (Eustachian tube dysfunction), bilateral  -     fluticasone (FLONASE) 50 MCG/ACT nasal spray; 1-2 sprays in each nostril once daily for allergies  Dispense: 18.2 g; Refill: 2    3. Otalgia of both ears    4. Seasonal allergic rhinitis due to pollen       Begin antibiotic for otitis media.  Restart Flonase nasal spray. Increase fluids.  Use Mucinex as directed on box. Follow-up if not better in 48 - 72 hours or sooner if worse.      Follow Up   Return  if symptoms worsen or fail to improve.  Patient was given instructions and counseling regarding his condition or for health maintenance advice. Please see specific information pulled into the AVS if appropriate.    There are no Patient Instructions on file for this visit.

## 2023-05-15 NOTE — PROGRESS NOTES
Subjective   John Wilburn is a 67 y.o. male.     Chief Complaint   Patient presents with   • Back Pain       Back Pain  This is a recurrent problem. The current episode started 1 to 4 weeks ago. The problem occurs constantly. The pain is present in the gluteal. The quality of the pain is described as stabbing (sharp). The pain does not radiate. The pain is at a severity of 3/10 (can increase to a 10 depending on what he is doing). The symptoms are aggravated by bending, position, standing and sitting. Pertinent negatives include no abdominal pain or chest pain. Risk factors include obesity. He has tried muscle relaxant, NSAIDs, ice and heat for the symptoms. The treatment provided mild relief.            I personally reviewed and updated the patient's allergies, medications, problem list, and past medical, surgical, social, and family history. I have reviewed and confirmed the accuracy of the History of Present Illness and Review of Symptoms as documented by the MA/LPN/RN. Kian Weston MD    Family History   Problem Relation Age of Onset   • Hypertension Mother    • Diabetes Mother    • Heart disease Mother    • Heart attack Father    • Rheumatic fever Father    • No Known Problems Sister    • No Known Problems Brother    • No Known Problems Maternal Aunt    • No Known Problems Maternal Uncle    • No Known Problems Paternal Aunt    • No Known Problems Paternal Uncle    • No Known Problems Maternal Grandmother    • No Known Problems Maternal Grandfather    • No Known Problems Paternal Grandmother    • No Known Problems Paternal Grandfather    • No Known Problems Other    • Anemia Neg Hx    • Arrhythmia Neg Hx    • Asthma Neg Hx    • Clotting disorder Neg Hx    • Fainting Neg Hx    • Heart failure Neg Hx    • Hyperlipidemia Neg Hx        Social History     Tobacco Use   • Smoking status: Former     Packs/day: 1.00     Years: 15.00     Pack years: 15.00     Types: Cigarettes     Start date: 1975     Quit date:  1990     Years since quittin.3   • Smokeless tobacco: Never   Vaping Use   • Vaping Use: Never used   Substance Use Topics   • Alcohol use: Yes   • Drug use: No       Past Surgical History:   Procedure Laterality Date   • CATARACT EXTRACTION, BILATERAL Bilateral    • CORONARY STENT PLACEMENT     • EYE SURGERY  10/16/2019    repair detached retina   • NASAL SEPTUM SURGERY         Patient Active Problem List   Diagnosis   • Acute myocardial infarction, subsequent episode of care   • Acute right-sided low back pain with right-sided sciatica   • Allergic rhinitis   • Coronary angioplasty status   • Edema   • Welcome to Medicare preventive visit   • Colon cancer screening   • Screening PSA (prostate specific antigen)   • GERD (gastroesophageal reflux disease)   • Hepatitis B   • Herpes zoster   • History of tobacco use   • HTN (hypertension), benign   • Hyperlipidemia   • Lumbar disc disease   • Hematoma   • Vertiginous syndrome and labyrinthine disorder   • Myalgia   • Nevus   • Obstructive sleep apnea   • Class 3 severe obesity due to excess calories with serious comorbidity and body mass index (BMI) of 40.0 to 44.9 in adult   • Right hip pain   • Vitamin D deficiency   • Tendinitis of right rotator cuff   • Left ear pain   • Unstable angina   • Chest pain in adult   • Elevated fasting glucose   • Left serous otitis media   • Coronary artery disease involving native coronary artery of native heart   • Bradycardia   • Left elbow pain   • Screening for malignant neoplasm of colon   • History of retinal detachment   • 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         Current Outpatient Medications:   •  aspirin 81 MG EC tablet, Take 1 tablet by mouth Daily., Disp: 90 tablet, Rfl: 3  •  carvedilol (COREG) 25 MG tablet, Take 1 tablet by mouth 2 (Two) Times a Day., Disp: 180 tablet, Rfl: 3  •  Cholecalciferol (VITAMIN D) 50 MCG ( UT) tablet, Take 1 tablet by mouth 2 (two) times a day., Disp: , Rfl:   •   "cyclobenzaprine (FLEXERIL) 10 MG tablet, Take 1 tablet by mouth At Night As Needed for Muscle Spasms., Disp: 90 tablet, Rfl: 2  •  fluticasone (FLONASE) 50 MCG/ACT nasal spray, 1-2 sprays in each nostril once daily for allergies, Disp: 18.2 g, Rfl: 2  •  losartan (COZAAR) 25 MG tablet, Take 1 tablet by mouth Daily., Disp: 90 tablet, Rfl: 3  •  nitroglycerin (NITROLINGUAL) 0.4 MG/SPRAY spray, Place 1 spray under the tongue Every 5 (Five) Minutes As Needed for Chest Pain., Disp: 1 each, Rfl: 0  •  rosuvastatin (CRESTOR) 20 MG tablet, Take 1 tablet by mouth Daily., Disp: 90 tablet, Rfl: 3  •  methylPREDNISolone (MEDROL) 4 MG dose pack, Take as directed on package instructions., Disp: 21 tablet, Rfl: 0         Review of Systems   Constitutional: Negative for chills and diaphoresis.   Eyes: Negative for visual disturbance.   Respiratory: Negative for shortness of breath.    Cardiovascular: Negative for chest pain and palpitations.   Gastrointestinal: Negative for abdominal pain and nausea.   Endocrine: Negative for polydipsia and polyphagia.   Musculoskeletal: Positive for back pain. Negative for neck stiffness.   Skin: Negative for color change and pallor.   Neurological: Negative for seizures and syncope.   Hematological: Negative for adenopathy.   Psychiatric/Behavioral: Negative for hallucinations and suicidal ideas.       I have reviewed and confirmed the accuracy of the ROS as documented by the MA/LPN/RN Kian Weston MD      Objective   /80 (BP Location: Right arm, Patient Position: Sitting, Cuff Size: Adult)   Pulse 101   Temp 98 °F (36.7 °C) (Temporal)   Resp 16   Ht 165.1 cm (65\")   Wt 121 kg (267 lb 9.6 oz)   SpO2 96%   BMI 44.53 kg/m²   BP Readings from Last 3 Encounters:   05/16/23 138/80   04/12/23 135/77   03/06/23 142/80     Wt Readings from Last 3 Encounters:   05/16/23 121 kg (267 lb 9.6 oz)   04/12/23 123 kg (271 lb)   03/06/23 124 kg (272 lb 12.8 oz)     Physical Exam  Constitutional:  "      Appearance: He is well-developed. He is not diaphoretic.   HENT:      Head: Normocephalic.      Right Ear: Tympanic membrane, ear canal and external ear normal.      Left Ear: Tympanic membrane, ear canal and external ear normal.      Nose: Nose normal.   Eyes:      General: Lids are normal.      Conjunctiva/sclera: Conjunctivae normal.      Pupils: Pupils are equal, round, and reactive to light.   Neck:      Thyroid: No thyromegaly.      Vascular: No carotid bruit or JVD.      Trachea: No tracheal deviation.   Cardiovascular:      Rate and Rhythm: Normal rate and regular rhythm.      Heart sounds: Normal heart sounds. No murmur heard.    No friction rub. No gallop.   Pulmonary:      Effort: Pulmonary effort is normal.      Breath sounds: Normal breath sounds. No stridor. No decreased breath sounds, wheezing or rales.   Abdominal:      General: Bowel sounds are normal. There is no distension.      Palpations: Abdomen is soft. There is no mass.      Tenderness: There is no abdominal tenderness. There is no guarding or rebound.      Hernia: No hernia is present.   Musculoskeletal:      Thoracic back: Normal. No swelling, edema, deformity, lacerations, spasms or tenderness. Normal range of motion.      Lumbar back: No swelling, edema, deformity, spasms or tenderness. Normal range of motion.      Right hip: No deformity, tenderness or crepitus. Normal range of motion. Normal strength.      Left hip: Normal. No deformity, tenderness or crepitus. Normal range of motion. Normal strength.   Lymphadenopathy:      Head:      Right side of head: No submental, submandibular, tonsillar, preauricular, posterior auricular or occipital adenopathy.      Left side of head: No submental, submandibular, tonsillar, preauricular, posterior auricular or occipital adenopathy.      Cervical: No cervical adenopathy.   Skin:     General: Skin is warm and dry.      Coloration: Skin is not pale.   Neurological:      Mental Status: He is  alert and oriented to person, place, and time.      Cranial Nerves: No cranial nerve deficit.      Sensory: No sensory deficit.      Motor: No atrophy or abnormal muscle tone.      Coordination: Coordination normal.      Gait: Gait normal.      Deep Tendon Reflexes: Reflexes are normal and symmetric.         Data / Lab Results:    Hemoglobin A1C   Date Value Ref Range Status   03/06/2023 6.7 % Final   02/14/2022 5.7 % Final   08/09/2021 5.8 % Final        Lab Results   Component Value Date    LDL 64 02/23/2023    LDL 69 01/28/2022    LDL 46 01/25/2021     Lab Results   Component Value Date    CHOL 114 08/08/2020    CHOL 132 04/05/2019    CHOL 130 10/19/2018     Lab Results   Component Value Date    TRIG 100 02/23/2023    TRIG 178 (H) 01/28/2022    TRIG 90 01/25/2021     Lab Results   Component Value Date    HDL 32 (L) 02/23/2023    HDL 35 (L) 01/28/2022    HDL 39 (L) 01/25/2021     Lab Results   Component Value Date    PSA 0.3 02/23/2023    PSA 0.3 01/28/2022    PSA 0.4 07/20/2020     Lab Results   Component Value Date    WBC 7.3 02/23/2023    HGB 14.7 02/23/2023    HCT 43.1 02/23/2023    MCV 87 02/23/2023     02/23/2023     Lab Results   Component Value Date    TSH 2.670 02/23/2023      Lab Results   Component Value Date    GLUCOSE 132 (H) 02/23/2023    BUN 14 02/23/2023    CREATININE 1.11 02/23/2023    EGFRIFNONA 68 01/28/2022    EGFRIFAFRI 79 01/28/2022    BCR 13 02/23/2023    K 5.4 (H) 02/23/2023    CO2 25 02/23/2023    CALCIUM 9.3 02/23/2023    PROTENTOTREF 6.5 02/23/2023    ALBUMIN 4.3 02/23/2023    LABIL2 2.0 02/23/2023    AST 48 (H) 02/23/2023    ALT 69 (H) 02/23/2023     No results found for: MAT, RF, SEDRATE   No results found for: CRP   No results found for: IRON, TIBC, FERRITIN   Lab Results   Component Value Date    NEBIYECF29 481 04/03/2018          Assessment & Plan      Medications        Problem List         LOS  Welcome to [facility]. doing well, vaccines current.  Recommend update tetanus  vaccine at the pharmacy.  Baby aspirin daily.  Discussed health maintenance, screening test, lifestyle modification.  AAA ultrasound scheduled.  Coronary artery disease.  Stable, followed by cardiology Dr. escobedo.  History of repeat stenting 2015.  Cardiac stress testing benign 2021.  Continue risk factor reduction.  Remote history of carotid Dopplers, recommend repeat, he declined secondary to cost but states he will consider in future.  Hypertension.  Good control.  Mild elevation today states he did not take his medication this morning.  Home blood pressure monitor results reviewed, overall good control.  Hyperlipidemia.  Improved on Crestor.  Plans to start weight watchers, follow-up recheck fasting labs.  Retinal detachment.  December/2019.  Improved status post surgery.  Followed by ophthalmology.  Lumbar disc disease.  Flare currently/lumbar muscle sprain, no radiculopathy.  Continue muscle relaxants, rehabilitation exercises discussed, consider PT.  Start prednisone.  Follow-up recheck.  Consider imaging if persistent symptoms.  Followed by pain management/neurosurgery, has had repeat imagery..  s/p course epidural, consider radiofrequency ablation.  Prostate screening.  Follow-up check PSA.  GERD.  Stable on PPI.  EGD benign/dilation only 2019.  Colon cancer screening.  Colonoscopy benign 2019.  Repeat eval 10 years.  IRENE.  Stable on CPAP.  Rotator cuff tendinitis.    Right.  Much improved today status post injection.  Rehabilitation exercises discussed.  Consider imaging, Ortho referral if persistent symptoms.  Elevated fasting blood sugar.  Discussed diet, exercise lifestyle modification.  Worse, FBS to 132, A1c to 6.7.  Discussed diet, exercise Cel[place] modification.  Follow-up 3 to 4 months.  Plan start Rx if persistent elevation.  Vocal tic.  Mild, infrequent symptoms currently.  Consider guanfacine.  Tremor.  Intermittent, benign exam today.  TSH normal.  Possibly secondary to benign physiologic  tremor.  Follow-up recheck.  Consider further eval if worsening symptoms.  OA left shoulder.  Ice, Tylenol, rehabilitation exercise discussed.  Continue as needed nightly muscle relaxant.  Consider imaging if persistent symptoms.  Joint injection declined.  Carpal tunnel.  Improved with nightly bracing.  COVID-19 vaccine status: Vaccinated.  Recommend booster.   Lateral epicondylitis.  Improved today status post injection. Ice, rehabilitation exercise discussed.  Counterforce bracing.  Call return if persistent symptoms.  COVID-19.  With cough, fever, no respiratory difficulty.  Has been vaccinated.  Significant bacterial sinusitis on exam, start antibiotics/prednisone/antivirals.  Continue coated baby aspirin daily.  Quarantine.  Signs symptom progression severe disease discussed, call or return if worsening symptoms.        Diagnoses and all orders for this visit:    1. Acute bilateral back pain, unspecified back location (Primary)  -     ketorolac (TORADOL) injection 60 mg  -     methylPREDNISolone (MEDROL) 4 MG dose pack; Take as directed on package instructions.  Dispense: 21 tablet; Refill: 0    2. Class 3 severe obesity due to excess calories with serious comorbidity and body mass index (BMI) of 40.0 to 44.9 in adult  Assessment & Plan:  Patient's (Body mass index is 44.53 kg/m².) indicates that they are morbidly/severely obese (BMI > 40 or > 35 with obesity - related health condition) with health conditions that include hypertension and dyslipidemias . Weight is improving with lifestyle modifications. BMI  is above average; BMI management plan is completed. We discussed portion control and increasing exercise.       3. History of tobacco use    4. Acute right-sided low back pain with right-sided sciatica    5. Coronary artery disease involving native coronary artery of native heart without angina pectoris    6. Gastroesophageal reflux disease without esophagitis    7. HTN (hypertension), benign    8. Mixed  hyperlipidemia            Expected course, medications, and adverse effects discussed.  Call or return if worsening or persistent symptoms.  I wore protective equipment throughout this patient encounter including a mask, gloves, and eye protection.  Hand hygiene was performed before donning protective equipment and after removal when leaving the room. The complete contents of the Assessment and Plan and Data/Lab Results as documented above have been reviewed and addressed by myself with the patient today as part of an ongoing evaluation / treatment plan.  If some of the documentation has been copied from a previous note and is unchanged it indicates that this problem / plan has been assessed today but is stable from a previous visit and no changes have been recommended.

## 2023-05-16 ENCOUNTER — OFFICE VISIT (OUTPATIENT)
Dept: FAMILY MEDICINE CLINIC | Facility: CLINIC | Age: 68
End: 2023-05-16
Payer: MEDICARE

## 2023-05-16 VITALS
OXYGEN SATURATION: 96 % | DIASTOLIC BLOOD PRESSURE: 80 MMHG | TEMPERATURE: 98 F | RESPIRATION RATE: 16 BRPM | HEIGHT: 65 IN | BODY MASS INDEX: 44.58 KG/M2 | HEART RATE: 101 BPM | WEIGHT: 267.6 LBS | SYSTOLIC BLOOD PRESSURE: 138 MMHG

## 2023-05-16 DIAGNOSIS — I25.10 CORONARY ARTERY DISEASE INVOLVING NATIVE CORONARY ARTERY OF NATIVE HEART WITHOUT ANGINA PECTORIS: ICD-10-CM

## 2023-05-16 DIAGNOSIS — Z87.891 HISTORY OF TOBACCO USE: ICD-10-CM

## 2023-05-16 DIAGNOSIS — M54.41 ACUTE RIGHT-SIDED LOW BACK PAIN WITH RIGHT-SIDED SCIATICA: ICD-10-CM

## 2023-05-16 DIAGNOSIS — I10 HTN (HYPERTENSION), BENIGN: Chronic | ICD-10-CM

## 2023-05-16 DIAGNOSIS — E66.01 CLASS 3 SEVERE OBESITY DUE TO EXCESS CALORIES WITH SERIOUS COMORBIDITY AND BODY MASS INDEX (BMI) OF 40.0 TO 44.9 IN ADULT: ICD-10-CM

## 2023-05-16 DIAGNOSIS — M54.9 ACUTE BILATERAL BACK PAIN, UNSPECIFIED BACK LOCATION: Primary | ICD-10-CM

## 2023-05-16 DIAGNOSIS — E78.2 MIXED HYPERLIPIDEMIA: Chronic | ICD-10-CM

## 2023-05-16 DIAGNOSIS — K21.9 GASTROESOPHAGEAL REFLUX DISEASE WITHOUT ESOPHAGITIS: Chronic | ICD-10-CM

## 2023-05-16 RX ORDER — METHYLPREDNISOLONE 4 MG/1
TABLET ORAL
Qty: 21 TABLET | Refills: 0 | Status: SHIPPED | OUTPATIENT
Start: 2023-05-16

## 2023-05-16 RX ORDER — KETOROLAC TROMETHAMINE 30 MG/ML
60 INJECTION, SOLUTION INTRAMUSCULAR; INTRAVENOUS ONCE
Status: COMPLETED | OUTPATIENT
Start: 2023-05-16 | End: 2023-05-16

## 2023-05-16 RX ADMIN — KETOROLAC TROMETHAMINE 60 MG: 30 INJECTION, SOLUTION INTRAMUSCULAR; INTRAVENOUS at 14:58

## 2023-05-16 NOTE — ASSESSMENT & PLAN NOTE
Patient's (Body mass index is 44.53 kg/m².) indicates that they are morbidly/severely obese (BMI > 40 or > 35 with obesity - related health condition) with health conditions that include hypertension and dyslipidemias . Weight is improving with lifestyle modifications. BMI  is above average; BMI management plan is completed. We discussed portion control and increasing exercise.

## 2023-08-14 NOTE — PROGRESS NOTES
Subjective   John Wilburn is a 68 y.o. male.     Chief Complaint   Patient presents with    Blood Sugar Problem    Hypertension    Hyperlipidemia       Hyperlipidemia  This is a chronic problem. The current episode started more than 1 year ago. Recent lipid tests were reviewed and are low. Exacerbating diseases include diabetes and obesity. Pertinent negatives include no chest pain, focal weakness, leg pain or shortness of breath. Current antihyperlipidemic treatment includes statins. The current treatment provides mild improvement of lipids. There are no compliance problems.  Risk factors for coronary artery disease include dyslipidemia, hypertension, male sex and obesity.   Hypertension  This is a chronic problem. The current episode started more than 1 year ago. The problem is unchanged. Pertinent negatives include no anxiety, chest pain, malaise/fatigue, palpitations, shortness of breath or sweats. There are no associated agents to hypertension. Risk factors for coronary artery disease include male gender, dyslipidemia and obesity. Past treatments include lifestyle changes. Current antihypertension treatment includes beta blockers and ACE inhibitors. The current treatment provides moderate improvement. There are no compliance problems.    Blood Sugar Problem  This is a new problem. The current episode started more than 1 month ago. Pertinent negatives include no abdominal pain, chest pain, chills, diaphoresis or nausea. Nothing aggravates the symptoms. He has tried nothing for the symptoms.          I personally reviewed and updated the patient's allergies, medications, problem list, and past medical, surgical, social, and family history. I have reviewed and confirmed the accuracy of the History of Present Illness and Review of Symptoms as documented by the MA/BETZY/RN. Kian Weston MD    Family History   Problem Relation Age of Onset    Hypertension Mother     Diabetes Mother     Heart disease Mother      Heart attack Father     Rheumatic fever Father     No Known Problems Sister     No Known Problems Brother     No Known Problems Maternal Aunt     No Known Problems Maternal Uncle     No Known Problems Paternal Aunt     No Known Problems Paternal Uncle     No Known Problems Maternal Grandmother     No Known Problems Maternal Grandfather     No Known Problems Paternal Grandmother     No Known Problems Paternal Grandfather     No Known Problems Other     Anemia Neg Hx     Arrhythmia Neg Hx     Asthma Neg Hx     Clotting disorder Neg Hx     Fainting Neg Hx     Heart failure Neg Hx     Hyperlipidemia Neg Hx        Social History     Tobacco Use    Smoking status: Former     Packs/day: 1.00     Years: 15.00     Pack years: 15.00     Types: Cigarettes     Start date:      Quit date: 1990     Years since quittin.6    Smokeless tobacco: Never   Vaping Use    Vaping Use: Never used   Substance Use Topics    Alcohol use: Yes    Drug use: No       Past Surgical History:   Procedure Laterality Date    CATARACT EXTRACTION, BILATERAL Bilateral     CORONARY STENT PLACEMENT      EYE SURGERY  10/16/2019    repair detached retina    NASAL SEPTUM SURGERY         Patient Active Problem List   Diagnosis    Acute myocardial infarction, subsequent episode of care    Acute right-sided low back pain with right-sided sciatica    Allergic rhinitis    Coronary angioplasty status    Edema    Welcome to Medicare preventive visit    Colon cancer screening    Screening PSA (prostate specific antigen)    GERD (gastroesophageal reflux disease)    Hepatitis B    Herpes zoster    History of tobacco use    HTN (hypertension), benign    Hyperlipidemia    Lumbar disc disease    Hematoma    Vertiginous syndrome and labyrinthine disorder    Myalgia    Nevus    Obstructive sleep apnea    Class 3 severe obesity due to excess calories with serious comorbidity and body mass index (BMI) of 40.0 to 44.9 in adult    Right hip pain    Vitamin D  deficiency    Tendinitis of right rotator cuff    Left ear pain    Unstable angina    Chest pain in adult    Elevated fasting glucose    Left serous otitis media    Coronary artery disease involving native coronary artery of native heart    Bradycardia    Left elbow pain    Screening for malignant neoplasm of colon    History of retinal detachment    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         Current Outpatient Medications:     aspirin 81 MG EC tablet, Take 1 tablet by mouth Daily., Disp: 90 tablet, Rfl: 3    carvedilol (COREG) 25 MG tablet, Take 1 tablet by mouth 2 (Two) Times a Day., Disp: 180 tablet, Rfl: 3    Cholecalciferol (VITAMIN D) 50 MCG (2000 UT) tablet, Take 1 tablet by mouth 2 (two) times a day., Disp: , Rfl:     cyclobenzaprine (FLEXERIL) 10 MG tablet, Take 1 tablet by mouth At Night As Needed for Muscle Spasms., Disp: 90 tablet, Rfl: 2    fluticasone (FLONASE) 50 MCG/ACT nasal spray, 1-2 sprays in each nostril once daily for allergies, Disp: 18.2 g, Rfl: 2    losartan (COZAAR) 25 MG tablet, Take 1 tablet by mouth Daily., Disp: 90 tablet, Rfl: 3    nitroglycerin (NITROLINGUAL) 0.4 MG/SPRAY spray, Place 1 spray under the tongue Every 5 (Five) Minutes As Needed for Chest Pain., Disp: 1 each, Rfl: 0    rosuvastatin (CRESTOR) 20 MG tablet, Take 1 tablet by mouth Daily., Disp: 90 tablet, Rfl: 3         Review of Systems   Constitutional:  Negative for chills, diaphoresis and malaise/fatigue.   HENT:  Negative for trouble swallowing and voice change.    Eyes:  Negative for visual disturbance.   Respiratory:  Negative for shortness of breath.    Cardiovascular:  Negative for chest pain and palpitations.   Gastrointestinal:  Negative for abdominal pain and nausea.   Endocrine: Negative for polydipsia and polyphagia.   Genitourinary:  Negative for hematuria.   Musculoskeletal:  Negative for neck stiffness.   Skin:  Negative for color change and pallor.   Allergic/Immunologic: Negative for immunocompromised  "state.   Neurological:  Negative for focal weakness, seizures and syncope.   Hematological:  Negative for adenopathy.   Psychiatric/Behavioral:  Negative for hallucinations, sleep disturbance and suicidal ideas.      I have reviewed and confirmed the accuracy of the ROS as documented by the MA/LPN/RN Kian Weston MD      Objective   /76 (BP Location: Right arm, Patient Position: Sitting, Cuff Size: Large Adult)   Pulse 66   Temp 98.4 øF (36.9 øC) (Temporal)   Resp 16   Ht 165.1 cm (65\")   Wt 120 kg (264 lb 12.8 oz)   SpO2 97%   BMI 44.07 kg/mý   BP Readings from Last 3 Encounters:   08/15/23 128/76   05/16/23 138/80   04/12/23 135/77     Wt Readings from Last 3 Encounters:   08/15/23 120 kg (264 lb 12.8 oz)   05/16/23 121 kg (267 lb 9.6 oz)   04/12/23 123 kg (271 lb)     Physical Exam  Constitutional:       Appearance: He is well-developed. He is not diaphoretic.   HENT:      Head: Normocephalic.      Right Ear: Tympanic membrane, ear canal and external ear normal.      Left Ear: Tympanic membrane, ear canal and external ear normal.      Nose: Nose normal.   Eyes:      General: Lids are normal.      Conjunctiva/sclera: Conjunctivae normal.      Pupils: Pupils are equal, round, and reactive to light.   Neck:      Thyroid: No thyromegaly.      Vascular: No carotid bruit or JVD.      Trachea: No tracheal deviation.   Cardiovascular:      Rate and Rhythm: Normal rate and regular rhythm.      Heart sounds: Normal heart sounds. No murmur heard.    No friction rub. No gallop.   Pulmonary:      Effort: Pulmonary effort is normal.      Breath sounds: Normal breath sounds. No stridor. No decreased breath sounds, wheezing or rales.   Abdominal:      General: Bowel sounds are normal. There is no distension.      Palpations: Abdomen is soft. There is no mass.      Tenderness: There is no abdominal tenderness. There is no guarding or rebound.      Hernia: No hernia is present.   Lymphadenopathy:      Head:      " Right side of head: No submental, submandibular, tonsillar, preauricular, posterior auricular or occipital adenopathy.      Left side of head: No submental, submandibular, tonsillar, preauricular, posterior auricular or occipital adenopathy.      Cervical: No cervical adenopathy.   Skin:     General: Skin is warm and dry.      Coloration: Skin is not pale.   Neurological:      Mental Status: He is alert and oriented to person, place, and time.      Cranial Nerves: No cranial nerve deficit.      Sensory: No sensory deficit.      Coordination: Coordination normal.      Gait: Gait normal.      Deep Tendon Reflexes: Reflexes are normal and symmetric.       Data / Lab Results:    Hemoglobin A1C   Date Value Ref Range Status   08/15/2023 6.4 % Final   03/06/2023 6.7 % Final   02/14/2022 5.7 % Final        Lab Results   Component Value Date    LDL 64 02/23/2023    LDL 69 01/28/2022    LDL 46 01/25/2021     Lab Results   Component Value Date    CHOL 114 08/08/2020    CHOL 132 04/05/2019    CHOL 130 10/19/2018     Lab Results   Component Value Date    TRIG 100 02/23/2023    TRIG 178 (H) 01/28/2022    TRIG 90 01/25/2021     Lab Results   Component Value Date    HDL 32 (L) 02/23/2023    HDL 35 (L) 01/28/2022    HDL 39 (L) 01/25/2021     Lab Results   Component Value Date    PSA 0.3 02/23/2023    PSA 0.3 01/28/2022    PSA 0.4 07/20/2020     Lab Results   Component Value Date    WBC 7.3 02/23/2023    HGB 14.7 02/23/2023    HCT 43.1 02/23/2023    MCV 87 02/23/2023     02/23/2023     Lab Results   Component Value Date    TSH 2.670 02/23/2023      Lab Results   Component Value Date    GLUCOSE 132 (H) 02/23/2023    BUN 14 02/23/2023    CREATININE 1.11 02/23/2023    EGFRIFNONA 68 01/28/2022    EGFRIFAFRI 79 01/28/2022    BCR 13 02/23/2023    K 5.4 (H) 02/23/2023    CO2 25 02/23/2023    CALCIUM 9.3 02/23/2023    PROTENTOTREF 6.5 02/23/2023    ALBUMIN 4.3 02/23/2023    LABIL2 2.0 02/23/2023    AST 48 (H) 02/23/2023    ALT 69 (H)  02/23/2023     No results found for: MAT, RF, SEDRATE   No results found for: CRP   No results found for: IRON, TIBC, FERRITIN   Lab Results   Component Value Date    XEHEQWWO33 481 04/03/2018          Assessment & Plan      Medications        Problem List         L    Health maintenance.. doing well, vaccines current.  Recommend update tetanus vaccine at the pharmacy.  Baby aspirin daily.  Discussed health maintenance, screening test, lifestyle modification.  AAA ultrasound scheduled.  Coronary artery disease.  Stable, followed by cardiology Dr. escobedo.  History of repeat stenting 2015.  Cardiac stress testing benign 2021.  Continue risk factor reduction.  Remote history of carotid Dopplers, recommend repeat, he declined secondary to cost but states he will consider in future.  Hypertension.  Good control.  Mild elevation today states he did not take his medication this morning.  Home blood pressure monitor results reviewed, overall good control.  Hyperlipidemia.  Improved on Crestor.  Plans to start weight watchers, follow-up recheck fasting labs.  Retinal detachment.  December/2019.  Improved status post surgery.  Followed by ophthalmology.  Lumbar disc disease.  Flare currently/lumbar muscle sprain, no radiculopathy.  Continue muscle relaxants, rehabilitation exercises discussed, consider PT.  Start prednisone.  Follow-up recheck.  Consider imaging if persistent symptoms.  Followed by pain management/neurosurgery, has had repeat imagery..  s/p course epidural, consider radiofrequency ablation.  Prostate screening.  Follow-up check PSA.  GERD.  Stable on PPI.  EGD benign/dilation only 2019.  Colon cancer screening.  Colonoscopy benign 2019.  Repeat eval 10 years.  IRENE.  Stable on CPAP.  Rotator cuff tendinitis.    Right.  Much improved today status post injection.  Rehabilitation exercises discussed.  Consider imaging, Ortho referral if persistent symptoms.  Elevated fasting blood sugar.  Discussed diet,  exercise lifestyle modification.  Improved today, A1c to 6.4.  Discussed diet, exercise Cel[place] modification.  Follow-up 3 to 4 months.  Plan start Rx if persistent elevation.  Vocal tic.  Mild, infrequent symptoms currently.  Consider guanfacine.  Tremor.  Intermittent, benign exam today.  TSH normal.  Possibly secondary to benign physiologic tremor.  Follow-up recheck.  Consider further eval if worsening symptoms.  OA left shoulder.  Ice, Tylenol, rehabilitation exercise discussed.  Continue as needed nightly muscle relaxant.  Consider imaging if persistent symptoms.  Joint injection declined.  Carpal tunnel.  Improved with nightly bracing.  COVID-19 vaccine status: Vaccinated.  Recommend booster.   Lateral epicondylitis.  Improved today status post injection. Ice, rehabilitation exercise discussed.  Counterforce bracing.  Call return if persistent symptoms.  COVID-19.  With cough, fever, no respiratory difficulty.  Has been vaccinated.  Significant bacterial sinusitis on exam, start antibiotics/prednisone/antivirals.  Continue coated baby aspirin daily.  Quarantine.  Signs symptom progression severe disease discussed, call or return if worsening symptoms.              Diagnoses and all orders for this visit:    1. Elevated fasting blood sugar (Primary)  -     POC Glycosylated Hemoglobin (Hb A1C)    2. HTN (hypertension), benign  -     CBC & Differential; Future  -     Comprehensive Metabolic Panel; Future  -     TSH; Future    3. Mixed hyperlipidemia  -     Lipid Panel With / Chol / HDL Ratio; Future    4. History of tobacco use    5. Class 3 severe obesity due to excess calories with serious comorbidity and body mass index (BMI) of 40.0 to 44.9 in adult  Assessment & Plan:  Patient's (Body mass index is 44.07 kg/mý.) indicates that they are morbidly/severely obese (BMI > 40 or > 35 with obesity - related health condition) with health conditions that include hypertension, diabetes mellitus, and dyslipidemias .  Weight is unchanged. BMI  is above average; BMI management plan is completed. We discussed portion control and increasing exercise.       6. Need for pneumococcal 20-valent conjugate vaccination  -     Pneumococcal Conjugate Vaccine 20-Valent All    7. Screening PSA (prostate specific antigen)  -     PSA Screen; Future    8. Essential hypertension  -     carvedilol (COREG) 25 MG tablet; Take 1 tablet by mouth 2 (Two) Times a Day.  Dispense: 180 tablet; Refill: 3    9. Acute right-sided low back pain with right-sided sciatica  -     cyclobenzaprine (FLEXERIL) 10 MG tablet; Take 1 tablet by mouth At Night As Needed for Muscle Spasms.  Dispense: 90 tablet; Refill: 2    10. ETD (Eustachian tube dysfunction), bilateral  -     fluticasone (FLONASE) 50 MCG/ACT nasal spray; 1-2 sprays in each nostril once daily for allergies  Dispense: 18.2 g; Refill: 2    11. Dyslipidemia  -     losartan (COZAAR) 25 MG tablet; Take 1 tablet by mouth Daily.  Dispense: 90 tablet; Refill: 3  -     rosuvastatin (CRESTOR) 20 MG tablet; Take 1 tablet by mouth Daily.  Dispense: 90 tablet; Refill: 3    12. Coronary artery disease involving native coronary artery of native heart without angina pectoris    13. 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              Expected course, medications, and adverse effects discussed.  Call or return if worsening or persistent symptoms.  I wore protective equipment throughout this patient encounter including a mask, gloves, and eye protection.  Hand hygiene was performed before donning protective equipment and after removal when leaving the room. The complete contents of the Assessment and Plan and Data/Lab Results as documented above have been reviewed and addressed by myself with the patient today as part of an ongoing evaluation / treatment plan.  If some of the documentation has been copied from a previous note and is unchanged it indicates that this problem / plan has been assessed today but is stable from a  previous visit and no changes have been recommended.

## 2023-08-15 ENCOUNTER — OFFICE VISIT (OUTPATIENT)
Dept: FAMILY MEDICINE CLINIC | Facility: CLINIC | Age: 68
End: 2023-08-15
Payer: MEDICARE

## 2023-08-15 VITALS
RESPIRATION RATE: 16 BRPM | HEART RATE: 66 BPM | SYSTOLIC BLOOD PRESSURE: 128 MMHG | WEIGHT: 264.8 LBS | HEIGHT: 65 IN | TEMPERATURE: 98.4 F | OXYGEN SATURATION: 97 % | DIASTOLIC BLOOD PRESSURE: 76 MMHG | BODY MASS INDEX: 44.12 KG/M2

## 2023-08-15 DIAGNOSIS — H33.311 HORSESHOE TEAR OF RETINA OF RIGHT EYE: ICD-10-CM

## 2023-08-15 DIAGNOSIS — I10 HTN (HYPERTENSION), BENIGN: Chronic | ICD-10-CM

## 2023-08-15 DIAGNOSIS — H69.83 ETD (EUSTACHIAN TUBE DYSFUNCTION), BILATERAL: ICD-10-CM

## 2023-08-15 DIAGNOSIS — Z12.5 SCREENING PSA (PROSTATE SPECIFIC ANTIGEN): ICD-10-CM

## 2023-08-15 DIAGNOSIS — E78.5 DYSLIPIDEMIA: ICD-10-CM

## 2023-08-15 DIAGNOSIS — M54.41 ACUTE RIGHT-SIDED LOW BACK PAIN WITH RIGHT-SIDED SCIATICA: ICD-10-CM

## 2023-08-15 DIAGNOSIS — Z23 NEED FOR PNEUMOCOCCAL 20-VALENT CONJUGATE VACCINATION: ICD-10-CM

## 2023-08-15 DIAGNOSIS — R73.01 ELEVATED FASTING BLOOD SUGAR: Primary | ICD-10-CM

## 2023-08-15 DIAGNOSIS — E78.2 MIXED HYPERLIPIDEMIA: Chronic | ICD-10-CM

## 2023-08-15 DIAGNOSIS — E66.01 CLASS 3 SEVERE OBESITY DUE TO EXCESS CALORIES WITH SERIOUS COMORBIDITY AND BODY MASS INDEX (BMI) OF 40.0 TO 44.9 IN ADULT: ICD-10-CM

## 2023-08-15 DIAGNOSIS — I10 ESSENTIAL HYPERTENSION: ICD-10-CM

## 2023-08-15 DIAGNOSIS — I25.10 CORONARY ARTERY DISEASE INVOLVING NATIVE CORONARY ARTERY OF NATIVE HEART WITHOUT ANGINA PECTORIS: ICD-10-CM

## 2023-08-15 DIAGNOSIS — Z87.891 HISTORY OF TOBACCO USE: ICD-10-CM

## 2023-08-15 LAB
EXPIRATION DATE: NORMAL
HBA1C MFR BLD: 6.4 %
Lab: NORMAL

## 2023-08-15 RX ORDER — LOSARTAN POTASSIUM 25 MG/1
25 TABLET ORAL DAILY
Qty: 90 TABLET | Refills: 3 | Status: SHIPPED | OUTPATIENT
Start: 2023-08-15

## 2023-08-15 RX ORDER — CYCLOBENZAPRINE HCL 10 MG
10 TABLET ORAL NIGHTLY PRN
Qty: 90 TABLET | Refills: 2 | Status: SHIPPED | OUTPATIENT
Start: 2023-08-15

## 2023-08-15 RX ORDER — ROSUVASTATIN CALCIUM 20 MG/1
20 TABLET, COATED ORAL DAILY
Qty: 90 TABLET | Refills: 3 | Status: SHIPPED | OUTPATIENT
Start: 2023-08-15

## 2023-08-15 RX ORDER — FLUTICASONE PROPIONATE 50 MCG
SPRAY, SUSPENSION (ML) NASAL
Qty: 18.2 G | Refills: 2 | Status: SHIPPED | OUTPATIENT
Start: 2023-08-15

## 2023-08-15 RX ORDER — CARVEDILOL 25 MG/1
25 TABLET ORAL 2 TIMES DAILY
Qty: 180 TABLET | Refills: 3 | Status: SHIPPED | OUTPATIENT
Start: 2023-08-15

## 2023-08-15 NOTE — ASSESSMENT & PLAN NOTE
Patient's (Body mass index is 44.07 kg/mý.) indicates that they are morbidly/severely obese (BMI > 40 or > 35 with obesity - related health condition) with health conditions that include hypertension, diabetes mellitus, and dyslipidemias . Weight is unchanged. BMI  is above average; BMI management plan is completed. We discussed portion control and increasing exercise.

## 2023-08-21 NOTE — PROGRESS NOTES
Sleep medicine follow-up visit    John Wilburn   1955  68 y.o. male   DATE OF SERVICE: 8/22/2023     IRENE f/u patient states he is benefiting from pap therapy, he uses [facility] and goes through [employer] for supplies.   The patient states he is doing very well with his CPAP.  He states he is having no difficulties.  He reports he has a ResMed machine and gets equipment supplies every 3 to 6 months for mask.  We were unable to download the patient's data and he will follow-up with [employer] to get his modem corrected.  He did have some data on his cell phone and that was entered into our compliance information below.        SLEEP TESTING HISTORY:    On NPSG at [facility], 9/18/21 patient had Moderate obstructive sleep apnea syndrome with apnea-hypopnea index of 29.7 per sleep hour, minimum SpO2 of 82%    The compliance data reviewed and the patient is on CPAP therapy at unknown cm/H2O and compliance data indicates excellent compliance with 100% usage for more than 4 hours with an average usage of 8 hours 40 minutes. AHI down to 1.1 with CPAP therapy and mean CPAP pressure unknown cm of water.  The patient's hypersomnia also resolved with [place] Sleepiness Scale score of unknown with CPAP therapy.  The patient feels great and is benefiting from it and is compliant.   Data received from [employer].           EPWORTH SLEEPINESS SCALE  Sitting and reading 0 WatchingTV 0  Sitting, inactive, in a public place 0  As a passenger in a car for 1 hour w/o a break  0  Lying down to rest in the afternoon  0  Sitting and talking to someone  0  Sitting quietly after a lunch  0  In a car, while stopped for traffic or a light  0  Total 0      Review of Systems   Respiratory:  Positive for apnea.    All other systems reviewed and are negative.  I reviewed and addressed ROS entered by MA.    History of Present Illness    The following portions of the patient's history were reviewed and updated as appropriate: allergies, current medications, past  family history, past medical history, past social history, past surgical history and problem list.      Family History   Problem Relation Age of Onset    Hypertension Mother     Diabetes Mother     Heart disease Mother     Heart attack Father     Rheumatic fever Father     No Known Problems Sister     No Known Problems Brother     No Known Problems Maternal Aunt     No Known Problems Maternal Uncle     No Known Problems Paternal Aunt     No Known Problems Paternal Uncle     No Known Problems Maternal Grandmother     No Known Problems Maternal Grandfather     No Known Problems Paternal Grandmother     No Known Problems Paternal Grandfather     No Known Problems Other     Anemia Neg Hx     Arrhythmia Neg Hx     Asthma Neg Hx     Clotting disorder Neg Hx     Fainting Neg Hx     Heart failure Neg Hx     Hyperlipidemia Neg Hx        Past Medical History:   Diagnosis Date    Acute bronchitis     Acute myocardial infarction, subsequent episode of care     Acute non-recurrent frontal sinusitis     Acute right-sided low back pain with right-sided sciatica     Impression: persistent, long standing back pain, worse, with rle radiculopathy, h/o vertebral fx, refractory to pt xray with severe degenerative changes l5/ s1 check mri, referrall to dr amarilys arias 20 minutes bid nsaids Rehabilitation exercises discussed. continue pt    Allergic rhinitis     Allergy to poison ivy     Impression: Due to the wide spread rash he was started on Prednisone per pt request. He was advised to RTC if his symptoms did not improve.    Annual visit for general adult medical examination with abnormal findings     Impression: doing well, vaccines current Age specific anticipatory guidance and warning signs discussed. Diet, exercise, and lifestyle modification discussed. Safety, seatbelts, and routine screening examinations discussed. Discussed self-examinations.    Bronchitis     Cellulitis of buttock     Impression: possibly 2nd insect bite Topical  care discussed. Discussed possible necessity of I and D. Call / return if fever, worsening symptoms.    Chest pain     Impression: atypical, improved / resolved currently possibly 2nd nausea /gi upset /viral uri serial ekg's, troponin's normal followed by cardiology in [place], he reports negative treadmill cardiolyte 12/11 d/c to home, Follow up 2 to 3 days. Follow up with cardiology planned consider repeat stress test if chest pain on exertion, worsening symptoms.    Colon cancer screening     Impression: has a colonoscopy, will get records    Conjunctivitis     Impression: viral Topical care discussed. wash hands frequently    Coronary atherosclerosis     Impression: h/o stent 2003, 2016 followed by meng, had recent neg stress test, will get records h/o normal carptid doppler per her report, will get records continue coreg, statin, effient continue risk factor reduction recommend cardiology Follow up he states h3 will consider    COVID-19     Detached retina     GERD (gastroesophageal reflux disease)     Hematoma     Impression: hand, much improved xray neg for fracture per ed Signs and symptoms of infecton discussed. Call / return if worsening symptoms.    Hepatitis B     Impression: h/o, recheck levels    Herpes zoster     Impression: topical care discussed cover with antibiotics Follow up for zostavax    History of tobacco use     HTN (hypertension), benign     Impression: good control Discussed low sodium diet, lifestyle modification. Follow up recheck    Hyperlipidemia     Impression: followed by Roxana blackman tolerating crestor rx, ldl to 71, + aggressive ldl target considering cad recheck    Lumbar disc disease     Impression: followed by Nick Ashton undergoing epidual injxn    Malaise and fatigue     Impression: check vitamin levels h/o low t do not recommend replacement consider age enedelia under good cobtrol consider wellbutrin Follow up recheck Call / return if worsening symptoms.    Myalgia      Impression: possibly 2nd crestor, hold x 1 to 2 mos risk/benefit RX discussed, considering restart possibly at lower dose    Nevus     Impression: path shows benign lentigo Discussed skin care, use of sun block and protective clothing.    Obesity     Obesity, morbid, BMI 40.0-49.9     DOCUMENTATION OF FOLLOW-UP PLAN FOR PATIENT WITH BMI ABOVE NORMAL ()    Overweight (BMI 25.0-29.9)     Impression: Discussed diet, exercise, lifestyle modification. Follow up [id number]    Pruritus     Right hip pain     Impression: check xray    Screening for depression     Negative Depression Screening (4 or less) ()    Screening PSA (prostate specific antigen)     Impression: psa normal / damián normal    Sinusitis, bacterial     Impression: He was prescribed Cipro and Tessalon perles. Increase fluids. Tylenol/motrin for pain or fever. Medication and medication adverse effects discussed. Follow-up 5-7 days for reevaluation if not improved or sooner if needed.    Skin lesion     Impression: rec resection / derm ref sched    Sleep apnea, obstructive     Story: on CPAP    URI (upper respiratory infection)     Impression: Increase fluid intake. Mucinex Call / return if fever, respiratory difficulty, worsening symptoms.    Vertiginous syndrome and labyrinthine disorder     Impression: Differential diagnosis discussed. Follow-up in 2 weeks if not better. Follow-up sooner for worsening symptoms or for any concerns. Zyrtec as directed.    Vertigo     Impression: likely secondary to sinusitis with eustachian tube dysfunction, rx in progress ddx includes vestibular neuritis, cover with prednisone Follow up recheck Consider imaging if persistent symptoms.    Vitamin D deficiency        Social History     Socioeconomic History    Marital status:    Tobacco Use    Smoking status: Former     Packs/day: 1.00     Years: 15.00     Pack years: 15.00     Types: Cigarettes     Start date: 1975     Quit date: 1/1/1990     Years since  quittin.6    Smokeless tobacco: Never   Vaping Use    Vaping Use: Never used   Substance and Sexual Activity    Alcohol use: Yes    Drug use: No    Sexual activity: Defer         Current Outpatient Medications:     aspirin 81 MG EC tablet, Take 1 tablet by mouth Daily., Disp: 90 tablet, Rfl: 3    carvedilol (COREG) 25 MG tablet, Take 1 tablet by mouth 2 (Two) Times a Day., Disp: 180 tablet, Rfl: 3    Cholecalciferol (VITAMIN D) 50 MCG (2000 UT) tablet, Take 1 tablet by mouth 2 (two) times a day., Disp: , Rfl:     cyclobenzaprine (FLEXERIL) 10 MG tablet, Take 1 tablet by mouth At Night As Needed for Muscle Spasms., Disp: 90 tablet, Rfl: 2    fluticasone (FLONASE) 50 MCG/ACT nasal spray, 1-2 sprays in each nostril once daily for allergies, Disp: 18.2 g, Rfl: 2    losartan (COZAAR) 25 MG tablet, Take 1 tablet by mouth Daily., Disp: 90 tablet, Rfl: 3    nitroglycerin (NITROLINGUAL) 0.4 MG/SPRAY spray, Place 1 spray under the tongue Every 5 (Five) Minutes As Needed for Chest Pain., Disp: 1 each, Rfl: 0    rosuvastatin (CRESTOR) 20 MG tablet, Take 1 tablet by mouth Daily., Disp: 90 tablet, Rfl: 3    Allergies   Allergen Reactions    Vicodin [Hydrocodone-Acetaminophen] Hives    Atorvastatin Hives    Propoxyphene Hives        PHYSICAL EXAMINATION:  Vitals:    23 0926   BP: 132/71   Pulse: 73      Body mass index is 44.26 kg/mý.       HEENT: Normal.    CARDIAC: Normal.   LUNGS: Clear to auscultation.   EXTREMITIES: No edema.     Diagnoses and all orders for this visit:    1. Obstructive sleep apnea (Primary)     The patient was cautioned that obstructive sleep disordered breathing might be aggravated by certain conditions such as post anesthetic states or respiratory allergies.  Medications such as sedatives, hypnotics, muscle relaxants, opiate analgesics and or alcohol can aggravate the underlying obstructive sleep disordered breathing.If the patient is significantly drowsy or inattentive during the daytime,  should be advised to avoid situations such as driving in which safety could be compromised by impairment of alertness       Return in about 1 year (around 8/22/2024) for Next scheduled follow upDr Seipel.    I spent 14 minutes caring for John on this date of service. This time includes time spent by me in the following activities: reviewing tests, obtaining and/or reviewing a separately obtained history, performing a medically appropriate examination and/or evaluation, counseling and educating the patient/family/caregiver, ordering medications, tests, or procedures, and documenting information in the medical record.      This document has been electronically signed by Katharina TOMAS on August 22, 2023 09:53 EDT

## 2023-08-22 ENCOUNTER — OFFICE VISIT (OUTPATIENT)
Dept: NEUROLOGY | Facility: CLINIC | Age: 68
End: 2023-08-22
Payer: MEDICARE

## 2023-08-22 VITALS
SYSTOLIC BLOOD PRESSURE: 132 MMHG | BODY MASS INDEX: 44.32 KG/M2 | HEART RATE: 73 BPM | DIASTOLIC BLOOD PRESSURE: 71 MMHG | WEIGHT: 266 LBS | HEIGHT: 65 IN

## 2023-08-22 DIAGNOSIS — G47.33 OBSTRUCTIVE SLEEP APNEA: Primary | ICD-10-CM

## 2023-08-22 PROCEDURE — 3075F SYST BP GE 130 - 139MM HG: CPT | Performed by: NURSE PRACTITIONER

## 2023-08-22 PROCEDURE — 99212 OFFICE O/P EST SF 10 MIN: CPT | Performed by: NURSE PRACTITIONER

## 2023-08-22 PROCEDURE — 3078F DIAST BP <80 MM HG: CPT | Performed by: NURSE PRACTITIONER

## 2023-09-12 NOTE — DISCHARGE SUMMARY
[facility] Medicine Services  DISCHARGE SUMMARY        Prepared For PCP:  Kian Weston MD    Patient Name: John Wilburn  : 1955  MRN: [id number]      Date of Admission:   2020    Date of Discharge:  2020    Length of stay:  LOS: 0 days     Hospital Course     Presenting Problem:   Unstable angina (CMS/HCC) [I20.0]      Active Hospital Problems    Diagnosis  POA   • Chest pain in adult [R07.9]  Yes   • Unstable angina (CMS/HCC) [I20.0]  Yes   • Coronary angioplasty status [Z98.61]  Not Applicable   • GERD (gastroesophageal reflux disease) [K21.9]  Yes   • Hyperlipidemia [E78.5]  Yes   • HTN (hypertension), benign [I10]  Yes   • Obstructive sleep apnea [G47.33]  Yes   • Overweight [E66.3]  Yes   • Coronary atherosclerosis [I25.10]  Yes      Resolved Hospital Problems   No resolved problems to display.           Hospital Course:  John Wilburn is a 65 y.o. male  who presents to [facility]  with a history of stents in his heart complains of chest pain and pressure in his chest that started about 30 minutes before her ER evaluation  He denied any provoking or alleviating factors No particular trigger.  He denies neck arm or jaw pain or radiation.  He denies shortness of breath nausea/vomiting.  He took 3 sprays of nitroglycerin and had symptom improvement   He denies any cough congestion fever chills.  He denies foreign travels  He denies leg pain or swelling.     Past cardiac history: multiple PCIs in [place] in the past  NSTEMI and MIGUEL to RCA 6/18/15 and LCX 2015//cath 2015   Patent stents in RCA and LCX,  Borderline disease in ramus intermedius and moderate disease in proximal LAD     2020:  Patient successfully ruled out for MI by cardiac enzymes  Cardiology was consulted.  Patient had a negative stress test with cardiology follow-up in 1 week         History of Present Illness  Review of Systems   Constitutional: Negative for chills and fever.    HENT: Negative for congestion and sinus pressure.    Eyes: Negative for photophobia and visual disturbance.   Respiratory: Positive for chest tightness. Negative for shortness of breath.    Cardiovascular: Positive for chest pain. Negative for leg swelling.   Gastrointestinal: Negative for abdominal pain and vomiting.   Endocrine: Negative for cold intolerance and heat intolerance.   Genitourinary: Negative for difficulty urinating and dysuria.   Musculoskeletal: Negative for arthralgias and back pain.   Skin: Negative for color change and pallor.   Neurological: Negative for dizziness and light-headedness.   Psychiatric/Behavioral: Negative for agitation and behavioral problems.        Recommendation for Outpatient Providers:             Reasons For Change In Medications and Indications for New Medications:        Day of Discharge     HPI:   Mr. Wilburn is a 65 y.o.  presents to [facility]  with a history of stents in his heart complains of chest pain and pressure in his chest that started about 30 minutes before her ER evaluation  He denied any provoking or alleviating factors No particular trigger.  He denies neck arm or jaw pain or radiation.  He denies shortness of breath nausea/vomiting.   He took 3 sprays of nitroglycerin and had symptom improvement   He denies any cough congestion fever chills.  He denies foreign travels  He denies leg pain or swelling.     Past cardiac history: multiple PCIs in [place] in the past  NSTEMI and MIGUEL to RCA 6/18/15 and LCX 06/24/2015//cath 7/8/2015   Patent stents in RCA and LCX,  Borderline disease in ramus intermedius and moderate disease in proximal LAD     8/8/2020:  Patient successfully ruled out for MI by cardiac enzymes  Cardiology was consulted.  Patient had a negative stress test with cardiology follow-up in 1 week         History of Present Illness  Review of Systems   Constitutional: Negative for chills and fever.   HENT: Negative for congestion and sinus  pressure.    Eyes: Negative for photophobia and visual disturbance.   Respiratory: Positive for chest tightness. Negative for shortness of breath.    Cardiovascular: Positive for chest pain. Negative for leg swelling.   Gastrointestinal: Negative for abdominal pain and vomiting.   Endocrine: Negative for cold intolerance and heat intolerance.   Genitourinary: Negative for difficulty urinating and dysuria.   Musculoskeletal: Negative for arthralgias and back pain.   Skin: Negative for color change and pallor.   Neurological: Negative for dizziness and light-headedness.   Psychiatric/Behavioral: Negative for agitation and behavioral problems.    Vital Signs:   Temp:  [97.9 °F (36.6 °C)-98.7 °F (37.1 °C)] 98.7 °F (37.1 °C)  Heart Rate:  [65-96] 72  Resp:  [14-17] 15  BP: (101-147)/(56-86) 133/86     Physical Exam:  Constitutional: He appears well-developed.   Eyes: EOM are normal.   Neck: Normal range of motion.   Cardiovascular: Normal rate.   Pulmonary/Chest: Effort normal.   Abdominal: Soft.   Musculoskeletal: Normal range of motion.   Neurological: He is alert.   Skin: Skin is warm.   Psychiatric: He has a normal mood and affect.   Nursing note and vitals reviewed.      Physical Exam    Pertinent  and/or Most Recent Results     Results from last 7 days   Lab Units 08/08/20  0102 08/07/20  0952   WBC 10*3/mm3 7.70 7.80   HEMOGLOBIN g/dL 14.5 14.4   HEMATOCRIT % 42.4 43.1   PLATELETS 10*3/mm3 193 220   SODIUM mmol/L 140 138   POTASSIUM mmol/L 4.0 4.4   CHLORIDE mmol/L 104 100   CO2 mmol/L 25.0 29.0   BUN  14 19   CREATININE mg/dL 0.92 1.21   GLUCOSE mg/dL 111* 119*   CALCIUM mg/dL 8.3* 9.1     Results from last 7 days   Lab Units 08/08/20  0102   BILIRUBIN mg/dL 0.5   ALK PHOS U/L 52   ALT (SGPT) U/L 31   AST (SGOT) U/L 23     Results from last 7 days   Lab Units 08/08/20  0102   CHOLESTEROL mg/dL 114   TRIGLYCERIDES mg/dL 144   HDL CHOL mg/dL 35*     Results from last 7 days   Lab Units 08/08/20  0637 08/08/20  0102  08/07/20  2118 08/07/20  1815 08/07/20  0952   TROPONIN T ng/mL <0.010 <0.010 <0.010 <0.010 <0.010       Brief Urine Lab Results     None          Microbiology Results Abnormal     None          Xr Chest 1 View    Result Date: 8/7/2020  Impression: No acute cardiopulmonary abnormality.  Electronically Signed By-Dax Royal On:8/7/2020 10:58 AM This report was finalized on [id number] by  Dax Royal, .                Results for orders placed during the hospital encounter of 08/07/20   Transthoracic Echo Complete With Contrast if Necessary Per Protocol    Narrative · Left ventricular wall thickness is consistent with mild asymmetric   hypertrophy.  · Estimated EF = 60%.  · Left ventricular systolic function is normal.  · Left atrial cavity size is mildly dilated.  · Left ventricular diastolic dysfunction (grade I) consistent with   impaired relaxation.                  Test Results Pending at Discharge   Order Current Status    Hemoglobin A1c In process            Procedures Performed           Consults:   Consults     Date and Time Order Name Status Description    8/7/2020 1708 Inpatient Cardiology Consult Completed     8/7/2020 1054 Hospitalist (on-call MD unless specified) Completed             Discharge Details        Discharge Medications      ASK your doctor about these medications      Instructions Start Date   aspirin 325 MG tablet   1 tablet, Oral, Daily      carvedilol 25 MG tablet  Commonly known as:  COREG   25 mg, Oral, 2 Times Daily      cyclobenzaprine 10 MG tablet  Commonly known as:  FLEXERIL   10 mg, Oral, Nightly PRN      enalapril 5 MG tablet  Commonly known as:  VASOTEC   5 mg, Oral, 2 Times Daily      esomeprazole 20 MG capsule  Commonly known as:  nexIUM   20 mg, Oral, Every Morning Before Breakfast      fluticasone 50 MCG/ACT nasal spray  Commonly known as:  Flonase   2 sprays, Nasal, Daily      nitroglycerin 0.4 MG/SPRAY spray  Commonly known as:  NITROLINGUAL   1 spray, Sublingual,  Every 5 Minutes PRN      rosuvastatin 20 MG tablet  Commonly known as:  CRESTOR   20 mg, Oral, Daily      Vitamin D 50 MCG (2000 UT) tablet   1 tablet, Oral, Daily             Allergies   Allergen Reactions   • Vicodin [Hydrocodone-Acetaminophen] Hives   • Atorvastatin Hives   • Propoxyphene Hives         Discharge Disposition: Home      Diet: Cardiac heart healthy  Hospital:  Diet Order   Procedures   • Diet Cardiac; Healthy Heart         Discharge Activity: As tolerated        CODE STATUS:    Code Status and Medical Interventions:   Ordered at: 08/07/20 1702     Level Of Support Discussed With:    Patient     Code Status:    CPR     Medical Interventions (Level of Support Prior to Arrest):    Full         Follow-up Appointments  PCP  Cardiology in 1 week (Roxana)     Future Appointments   Date Time Provider Department Center   12/4/2020 11:10 AM Sigifredo Schmidt MD [facility]   1/25/2021  8:30 AM [facility]   2/8/2021  8:00 AM Kian Weston MD [facility]             Condition on Discharge: Stable    Stable      This patient has been examined wearing appropriate Personal Protective Equipment and discussed with hospital infection control department. 08/08/20      Electronically signed by Roberta Vargas MD, 08/08/20, 4:32 PM.      Time: I spent  55 minutes on this discharge activity which included face-to-face encounter with the patient/reviewing the data in the system/coordination of the care with the nursing staff as well as consultants/documentation/entering orders.       Spontaneous, unlabored and symmetrical

## 2023-12-06 NOTE — PROGRESS NOTES
"  Office Note     Name: John Wilburn    : 1955     MRN: [id number]     Chief Complaint  Earache    Subjective     John Wilburn presents to [facility] for an acute complaint of URI, ear pain.    History of Present Illness  John has had an earache for a month. He recently went to an urgent care and was diagnosed with an ear infection and was given a 5 day script of Augmentin. After the 5 days, he states it started to come back. He also went to his dentist this past Wednesday to rule out any oral issues and he reports they cleared him. He also mentions his face is sore to the touch. He is a [employer]  and cannot tell if he has lost any hearing because his hearing was not great anyways from his service. He states having issues with his ears as a child. He has a history of nasal septum surgery.  Earache   There is pain in the left ear. This is a new problem. The current episode started 1 to 4 weeks ago. The problem occurs constantly. The problem has been gradually worsening. There has been no fever. The pain is mild. Associated symptoms include headaches. Pertinent negatives include no vomiting. He has tried antibiotics for the symptoms. The treatment provided mild relief.       No h/o seasonal allergies.    He reports ear aches all his life. Recurrent since he was a \"kid\".  Used to happen more in the summer when he was growing up.     He used to live in [place]: [id number] - no problems.   He does use a CPAP machine (brand new) with humidifier. He reports every morning when he wakes up his left ear is wet in the morning.   His suspicion is that the condensation from the humidifier is building up  (the humidifier heats up) -   He cleans the machine regularly with soap and water.      He went to [facility] and was treated with Augmentin for five days.         Current Outpatient Medications:     aspirin 81 MG EC tablet, Take 1 tablet by mouth Daily., Disp: 90 tablet, " Rfl: 3    carvedilol (COREG) 25 MG tablet, Take 1 tablet by mouth 2 (Two) Times a Day., Disp: 180 tablet, Rfl: 3    Cholecalciferol (VITAMIN D) 50 MCG (2000 UT) tablet, Take 1 tablet by mouth 2 (two) times a day., Disp: , Rfl:     cyclobenzaprine (FLEXERIL) 10 MG tablet, Take 1 tablet by mouth At Night As Needed for Muscle Spasms., Disp: 90 tablet, Rfl: 2    fluticasone (FLONASE) 50 MCG/ACT nasal spray, 1-2 sprays in each nostril once daily for allergies, Disp: 18.2 g, Rfl: 2    losartan (COZAAR) 25 MG tablet, Take 1 tablet by mouth Daily., Disp: 90 tablet, Rfl: 3    nitroglycerin (NITROLINGUAL) 0.4 MG/SPRAY spray, Place 1 spray under the tongue Every 5 (Five) Minutes As Needed for Chest Pain., Disp: 1 each, Rfl: 0    rosuvastatin (CRESTOR) 20 MG tablet, Take 1 tablet by mouth Daily., Disp: 90 tablet, Rfl: 3    amoxicillin-clavulanate (AUGMENTIN) 875-125 MG per tablet, Take 1 tablet by mouth 2 (Two) Times a Day., Disp: 20 tablet, Rfl: 0    cetirizine (zyrTEC) 10 MG tablet, Take 1 tablet by mouth Daily., Disp: 30 tablet, Rfl: 1    Allergies   Allergen Reactions    Vicodin [Hydrocodone-Acetaminophen] Hives    Atorvastatin Hives    Propoxyphene Hives       Past Surgical History:   Procedure Laterality Date    CATARACT EXTRACTION, BILATERAL Bilateral     CORONARY STENT PLACEMENT      EYE SURGERY  10/16/2019    repair detached retina    NASAL SEPTUM SURGERY          Family History   Problem Relation Age of Onset    Hypertension Mother     Diabetes Mother             Heart disease Mother     Heart attack Father     Rheumatic fever Father     No Known Problems Sister     No Known Problems Brother     No Known Problems Maternal Aunt     No Known Problems Maternal Uncle     No Known Problems Paternal Aunt     No Known Problems Paternal Uncle     No Known Problems Maternal Grandmother     No Known Problems Maternal Grandfather     No Known Problems Paternal Grandmother     No Known Problems Paternal Grandfather   "   No Known Problems Other     Anemia Neg Hx     Arrhythmia Neg Hx     Asthma Neg Hx     Clotting disorder Neg Hx     Fainting Neg Hx     Heart failure Neg Hx     Hyperlipidemia Neg Hx             3/6/2023     8:16 AM   PHQ-2/PHQ-9 Depression Screening   Little Interest or Pleasure in Doing Things 0-->not at all   Feeling Down, Depressed or Hopeless 0-->not at all   PHQ-9: Brief Depression Severity Measure Score 0       Review of Systems   HENT:  Positive for ear pain.    Gastrointestinal:  Negative for vomiting.       Objective     /74 (BP Location: Right arm, Patient Position: Sitting, Cuff Size: Adult)   Pulse 65   Temp 98.3 °F (36.8 °C) (Temporal)   Resp 18   Ht 165.1 cm (65\")   Wt 121 kg (266 lb)   SpO2 97%   BMI 44.26 kg/m²        Physical Exam  Vitals and nursing note reviewed.   Constitutional:       General: He is not in acute distress.     Appearance: Normal appearance. He is well-groomed and overweight. He is not ill-appearing, toxic-appearing or diaphoretic.   HENT:      Head: Normocephalic and atraumatic.      Right Ear: Ear canal and external ear normal. A middle ear effusion is present.      Left Ear: Ear canal and external ear normal. A middle ear effusion is present.      Nose: No congestion or rhinorrhea.      Right Sinus: No maxillary sinus tenderness or frontal sinus tenderness.      Left Sinus: No maxillary sinus tenderness or frontal sinus tenderness.      Mouth/Throat:      Lips: Pink. No lesions.      Mouth: Mucous membranes are moist.      Pharynx: Oropharynx is clear. No posterior oropharyngeal erythema.   Eyes:      General: Lids are normal. Vision grossly intact. Gaze aligned appropriately.      Extraocular Movements: Extraocular movements intact.      Pupils: Pupils are equal, round, and reactive to light.   Neck:      Thyroid: Thyromegaly present. No thyroid mass.      Vascular: No carotid bruit.   Cardiovascular:      Rate and Rhythm: Normal rate and regular rhythm.      " Heart sounds: Normal heart sounds. No murmur heard.  Pulmonary:      Effort: Pulmonary effort is normal.      Breath sounds: Normal breath sounds and air entry.   Musculoskeletal:      Cervical back: Normal range of motion and neck supple.   Lymphadenopathy:      Cervical: Cervical adenopathy present.   Skin:     General: Skin is warm and dry.   Neurological:      General: No focal deficit present.      Mental Status: He is alert and oriented to person, place, and time. Mental status is at baseline.   Psychiatric:         Attention and Perception: Attention and perception normal.         Mood and Affect: Mood normal.         Behavior: Behavior normal. Behavior is cooperative.         Thought Content: Thought content normal.       Derm Physical Exam  Result Review :{ Labs  Result Review  Imaging  Med Tab  Media     Assessment and Plan      {CC Problem List  Visit Diagnosis  ROS  Review (Popup)  Health Maintenance  Quality  BestPractice  Medications  SmartSets  SnapShot Encounters  Media    Problems Addressed this Visit          Allergies and Adverse Reactions    Allergic rhinitis    Relevant Medications    cetirizine (zyrTEC) 10 MG tablet       ENT    Left ear pain - Primary    Relevant Medications    amoxicillin-clavulanate (AUGMENTIN) 875-125 MG per tablet     Other Visit Diagnoses       Cervical adenopathy        Relevant Medications    cetirizine (zyrTEC) 10 MG tablet    Dysfunction of both eustachian tubes        Relevant Medications    cetirizine (zyrTEC) 10 MG tablet    amoxicillin-clavulanate (AUGMENTIN) 875-125 MG per tablet    Enlarged thyroid              Diagnoses         Codes Comments    Left ear pain    -  Primary ICD-10-CM: H92.02  ICD-9-CM: 388.70     Cervical adenopathy     ICD-10-CM: R59.0  ICD-9-CM: 785.6     Dysfunction of both eustachian tubes     ICD-10-CM: H69.93  ICD-9-CM: 381.81     Enlarged thyroid     ICD-10-CM: E04.9  ICD-9-CM: 240.9     Seasonal allergic rhinitis due to  pollen     ICD-10-CM: J30.1  ICD-9-CM: 477.0            Procedures    Problem List Items Addressed This Visit          Allergies and Adverse Reactions    Allergic rhinitis    Relevant Medications    cetirizine (zyrTEC) 10 MG tablet       ENT    Left ear pain - Primary    Relevant Medications    amoxicillin-clavulanate (AUGMENTIN) 875-125 MG per tablet     Other Visit Diagnoses       Cervical adenopathy        Relevant Medications    cetirizine (zyrTEC) 10 MG tablet    Dysfunction of both eustachian tubes        Relevant Medications    cetirizine (zyrTEC) 10 MG tablet    amoxicillin-clavulanate (AUGMENTIN) 875-125 MG per tablet    Enlarged thyroid                 {Instructions Charge Capture  Follow-up Communications     Wrapup Tab  Return for Follow up with primary care provider as needed..     Patient Instructions   Continue current plan of care as discussed.   Take medication as ordered (if applicable).    Change toothbrush after 24 hour antibiotic use.  Gargle with warm salt water for symptomatic relief of sore throat.   Stay hydrated.     Practice good sleep hygiene.  Eat a well balanced diet with fresh fruit and vegetables.    Drink at least 8 bottles of water or equivalent per day.     Limit sweetened beverages, sodas, juices.    Bake, boil, broil or grill your food, avoid eating fried foods.   Exercise at least 150 minutes per week.      Patient was given instructions and counseling regarding his condition or for health maintenance advice. Please see specific information pulled into the AVS if appropriate.  Hand hygiene was performed during entrance to exam room and following assessment of patient. This document is intended for medical expert use only.     EMR Dragon/Transcription disclaimer:   Much of this encounter note is an electronic transcription/translation of spoken language to printed text. The electronic translation of spoken language may permit erroneous, or at times, nonsensical words or  phrases to be inadvertently transcribed.      PEPE Mendosa, FNP-C  [address]  [facility]  [address] [place] [address] [address]  [facility] [address] [phone]  Answers submitted by the patient for this visit:  Primary Reason for Visit (Submitted on 12/7/2023)  What is the primary reason for your visit?: Ear Pain

## 2023-12-08 ENCOUNTER — OFFICE VISIT (OUTPATIENT)
Dept: FAMILY MEDICINE CLINIC | Facility: CLINIC | Age: 68
End: 2023-12-08
Payer: MEDICARE

## 2023-12-08 VITALS
RESPIRATION RATE: 18 BRPM | OXYGEN SATURATION: 97 % | DIASTOLIC BLOOD PRESSURE: 74 MMHG | SYSTOLIC BLOOD PRESSURE: 130 MMHG | BODY MASS INDEX: 44.32 KG/M2 | TEMPERATURE: 98.3 F | HEART RATE: 65 BPM | WEIGHT: 266 LBS | HEIGHT: 65 IN

## 2023-12-08 DIAGNOSIS — E04.9 ENLARGED THYROID: ICD-10-CM

## 2023-12-08 DIAGNOSIS — R59.0 CERVICAL ADENOPATHY: ICD-10-CM

## 2023-12-08 DIAGNOSIS — H92.02 LEFT EAR PAIN: Primary | ICD-10-CM

## 2023-12-08 DIAGNOSIS — J30.1 SEASONAL ALLERGIC RHINITIS DUE TO POLLEN: ICD-10-CM

## 2023-12-08 DIAGNOSIS — H69.93 DYSFUNCTION OF BOTH EUSTACHIAN TUBES: ICD-10-CM

## 2023-12-08 PROCEDURE — 99213 OFFICE O/P EST LOW 20 MIN: CPT

## 2023-12-08 PROCEDURE — 3075F SYST BP GE 130 - 139MM HG: CPT

## 2023-12-08 PROCEDURE — 3078F DIAST BP <80 MM HG: CPT

## 2023-12-08 RX ORDER — AMOXICILLIN AND CLAVULANATE POTASSIUM 875; 125 MG/1; MG/1
1 TABLET, FILM COATED ORAL 2 TIMES DAILY
Qty: 20 TABLET | Refills: 0 | Status: SHIPPED | OUTPATIENT
Start: 2023-12-08

## 2023-12-08 RX ORDER — CETIRIZINE HYDROCHLORIDE 10 MG/1
10 TABLET ORAL DAILY
Qty: 30 TABLET | Refills: 1 | Status: SHIPPED | OUTPATIENT
Start: 2023-12-08

## 2023-12-11 RX ORDER — CETIRIZINE HYDROCHLORIDE 10 MG/1
10 TABLET ORAL DAILY
Qty: 90 TABLET | OUTPATIENT
Start: 2023-12-11

## 2023-12-19 ENCOUNTER — OFFICE VISIT (OUTPATIENT)
Dept: FAMILY MEDICINE CLINIC | Facility: CLINIC | Age: 68
End: 2023-12-19
Payer: MEDICARE

## 2023-12-19 VITALS
DIASTOLIC BLOOD PRESSURE: 66 MMHG | HEIGHT: 65 IN | BODY MASS INDEX: 44.79 KG/M2 | TEMPERATURE: 97.3 F | SYSTOLIC BLOOD PRESSURE: 146 MMHG | HEART RATE: 76 BPM | OXYGEN SATURATION: 98 % | WEIGHT: 268.8 LBS | RESPIRATION RATE: 20 BRPM

## 2023-12-19 DIAGNOSIS — U07.1 UPPER RESPIRATORY TRACT INFECTION DUE TO COVID-19 VIRUS: ICD-10-CM

## 2023-12-19 DIAGNOSIS — Z87.891 HISTORY OF TOBACCO USE: ICD-10-CM

## 2023-12-19 DIAGNOSIS — E66.01 CLASS 3 SEVERE OBESITY DUE TO EXCESS CALORIES WITH SERIOUS COMORBIDITY AND BODY MASS INDEX (BMI) OF 40.0 TO 44.9 IN ADULT: ICD-10-CM

## 2023-12-19 DIAGNOSIS — J06.9 UPPER RESPIRATORY TRACT INFECTION DUE TO COVID-19 VIRUS: ICD-10-CM

## 2023-12-19 DIAGNOSIS — J06.9 UPPER RESPIRATORY TRACT INFECTION DUE TO COVID-19 VIRUS: Primary | ICD-10-CM

## 2023-12-19 DIAGNOSIS — J02.8 PHARYNGITIS DUE TO OTHER ORGANISM: ICD-10-CM

## 2023-12-19 DIAGNOSIS — Z20.822 EXPOSURE TO COVID-19 VIRUS: ICD-10-CM

## 2023-12-19 DIAGNOSIS — U07.1 UPPER RESPIRATORY TRACT INFECTION DUE TO COVID-19 VIRUS: Primary | ICD-10-CM

## 2023-12-19 LAB
EXPIRATION DATE: NORMAL
FLUAV AG UPPER RESP QL IA.RAPID: NOT DETECTED
FLUBV AG UPPER RESP QL IA.RAPID: NOT DETECTED
INTERNAL CONTROL: NORMAL
Lab: NORMAL
SARS-COV-2 AG UPPER RESP QL IA.RAPID: NOT DETECTED

## 2023-12-19 PROCEDURE — 99213 OFFICE O/P EST LOW 20 MIN: CPT | Performed by: FAMILY MEDICINE

## 2023-12-19 PROCEDURE — 3078F DIAST BP <80 MM HG: CPT | Performed by: FAMILY MEDICINE

## 2023-12-19 PROCEDURE — 3077F SYST BP >= 140 MM HG: CPT | Performed by: FAMILY MEDICINE

## 2023-12-19 PROCEDURE — 87428 SARSCOV & INF VIR A&B AG IA: CPT | Performed by: FAMILY MEDICINE

## 2023-12-19 RX ORDER — PREDNISONE 20 MG/1
40 TABLET ORAL DAILY
Qty: 14 TABLET | Refills: 0 | Status: SHIPPED | OUTPATIENT
Start: 2023-12-19 | End: 2023-12-26

## 2023-12-19 NOTE — TELEPHONE ENCOUNTER
Caller: John Wilburn    Relationship: Self    Best call back number: [phone]    Requested Prescriptions:   Requested Prescriptions     Pending Prescriptions Disp Refills    Nirmatrelvir & Ritonavir, 300mg/100mg, (PAXLOVID) 20 x 150 MG & 10 x 100MG tablet therapy pack tablet 30 tablet 0     Sig: Take 3 tablets by mouth 2 (Two) Times a Day.        Pharmacy where request should be sent: [facility][id number] - [place], [place]  [address] [phone] [facility] [phone] FX     Last office visit with prescribing clinician: 8/15/2023   Last telemedicine visit with prescribing clinician: Visit date not found   Next office visit with prescribing clinician: 3/5/2024     Additional details provided by patient: [facility] DID NOT HAVE IT.     Does the patient have less than a 3 day supply:  [x] Yes  [] No    Would you like a call back once the refill request has been completed: [] Yes [x] No    If the office needs to give you a call back, can they leave a voicemail: [] Yes [x] No    Sangita Robins Rep   12/19/23 15:40 EST

## 2023-12-19 NOTE — PROGRESS NOTES
Subjective   John Wilburn is a 68 y.o. male.   Chief Complaint   Patient presents with    URI       History of Present Illness  Wife positive for covid 12/18/23  He has the same sx as she does   URI   This is a new problem. The current episode started yesterday. There has been no fever. Associated symptoms include coughing, ear pain, headaches and a sore throat. He has tried acetaminophen and NSAIDs for the symptoms. The treatment provided no relief.        The following portions of the patient's history were reviewed and updated as appropriate: allergies, current medications, past family history, past medical history, past social history, past surgical history, and problem list.    Patient Active Problem List   Diagnosis    Acute myocardial infarction, subsequent episode of care    Acute right-sided low back pain with right-sided sciatica    Allergic rhinitis    Coronary angioplasty status    Edema    Welcome to Medicare preventive visit    Colon cancer screening    Screening PSA (prostate specific antigen)    GERD (gastroesophageal reflux disease)    Hepatitis B    Herpes zoster    History of tobacco use    HTN (hypertension), benign    Hyperlipidemia    Lumbar disc disease    Hematoma    Vertiginous syndrome and labyrinthine disorder    Myalgia    Nevus    Obstructive sleep apnea    Class 3 severe obesity due to excess calories with serious comorbidity and body mass index (BMI) of 40.0 to 44.9 in adult    Right hip pain    Vitamin D deficiency    Tendinitis of right rotator cuff    Left ear pain    Unstable angina    Chest pain in adult    Elevated fasting glucose    Left serous otitis media    Coronary artery disease involving native coronary artery of native heart    Bradycardia    Left elbow pain    Screening for malignant neoplasm of colon    History of retinal detachment    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 90 tablet 3    carvedilol (COREG) 25 MG tablet Take 1 tablet by mouth 2 (Two) Times a Day. 180 tablet 3    cetirizine (zyrTEC) 10 MG tablet Take 1 tablet by mouth Daily. 30 tablet 1    Cholecalciferol (VITAMIN D) 50 MCG (2000 UT) tablet Take 1 tablet by mouth 2 (two) times a day.      cyclobenzaprine (FLEXERIL) 10 MG tablet Take 1 tablet by mouth At Night As Needed for Muscle Spasms. 90 tablet 2    fluticasone (FLONASE) 50 MCG/ACT nasal spray 1-2 sprays in each nostril once daily for allergies 18.2 g 2    losartan (COZAAR) 25 MG tablet Take 1 tablet by mouth Daily. 90 tablet 3    nitroglycerin (NITROLINGUAL) 0.4 MG/SPRAY spray Place 1 spray under the tongue Every 5 (Five) Minutes As Needed for Chest Pain. 1 each 0    rosuvastatin (CRESTOR) 20 MG tablet Take 1 tablet by mouth Daily. 90 tablet 3    [DISCONTINUED] amoxicillin-clavulanate (AUGMENTIN) 875-125 MG per tablet Take 1 tablet by mouth 2 (Two) Times a Day. 20 tablet 0     No current facility-administered medications on file prior to visit.     Current outpatient and discharge medications have been reconciled for the patient.  Reviewed by: Bhargav Martin MD      Allergies   Allergen Reactions    Vicodin [Hydrocodone-Acetaminophen] Hives    Atorvastatin Hives    Propoxyphene Hives       Review of Systems   Constitutional:  Positive for fatigue.   HENT:  Positive for ear pain and sore throat.    Respiratory:  Positive for cough.    Musculoskeletal:  Positive for myalgias.     I have reviewed and confirmed the accuracy of the ROS as documented by the MA/LPN/RN Bhargav Martin MD    Objective   Visit Vitals  /66 (BP Location: Right arm, Patient Position: Sitting, Cuff Size: Large Adult)   Pulse 76   Temp 97.3 °F (36.3 °C) (Temporal)   Resp 20   Ht 165.1 cm (65\")   Wt 122 kg (268 lb 12.8 oz)   SpO2 98%   BMI 44.73 kg/m²      **  Physical Exam  Constitutional:       Appearance: He is " well-developed.   HENT:      Head: Normocephalic and atraumatic.      Right Ear: External ear normal.      Left Ear: External ear normal.      Nose: Nose normal.      Mouth/Throat:      Pharynx: Posterior oropharyngeal erythema present.   Eyes:      Pupils: Pupils are equal, round, and reactive to light.   Cardiovascular:      Rate and Rhythm: Normal rate and regular rhythm.      Heart sounds: Normal heart sounds.   Pulmonary:      Effort: Pulmonary effort is normal.      Breath sounds: Normal breath sounds.   Abdominal:      General: Bowel sounds are normal.      Palpations: Abdomen is soft.   Musculoskeletal:         General: Normal range of motion.      Cervical back: Normal range of motion and neck supple.   Skin:     General: Skin is warm and dry.   Neurological:      Mental Status: He is alert and oriented to person, place, and time.   Psychiatric:         Behavior: Behavior normal.         Thought Content: Thought content normal.         Judgment: Judgment normal.       Derm Physical Exam  Ortho Exam   Neurologic Exam     Mental Status   Oriented to person, place, and time.     Cranial Nerves     CN III, IV, VI   Pupils are equal, round, and reactive to light.       Diagnoses and all orders for this visit:    1. Upper respiratory tract infection due to COVID-19 virus (Primary)  -     Nirmatrelvir & Ritonavir, 300mg/100mg, (PAXLOVID) 20 x 150 MG & 10 x 100MG tablet therapy pack tablet; Take 3 tablets by mouth 2 (Two) Times a Day.  Dispense: 30 tablet; Refill: 0    2. Exposure to COVID-19 virus  -     POCT SARS-CoV-2 + Flu Antigen LATONIA  -     Nirmatrelvir & Ritonavir, 300mg/100mg, (PAXLOVID) 20 x 150 MG & 10 x 100MG tablet therapy pack tablet; Take 3 tablets by mouth 2 (Two) Times a Day.  Dispense: 30 tablet; Refill: 0    3. Pharyngitis due to other organism  -     predniSONE (DELTASONE) 20 MG tablet; Take 2 tablets by mouth Daily for 7 days.  Dispense: 14 tablet; Refill: 0    4. Class 3 severe obesity due to  excess calories with serious comorbidity and body mass index (BMI) of 40.0 to 44.9 in adult    5. History of tobacco use               Expected course, medications, and adverse effects discussed as appropriate.  Call or return if worsening or persistent symptoms.     This document is intended for medical professional use only.

## 2023-12-20 DIAGNOSIS — J06.9 UPPER RESPIRATORY TRACT INFECTION DUE TO COVID-19 VIRUS: ICD-10-CM

## 2023-12-20 DIAGNOSIS — U07.1 UPPER RESPIRATORY TRACT INFECTION DUE TO COVID-19 VIRUS: ICD-10-CM

## 2023-12-20 DIAGNOSIS — Z20.822 EXPOSURE TO COVID-19 VIRUS: ICD-10-CM

## 2024-01-15 ENCOUNTER — OFFICE VISIT (OUTPATIENT)
Dept: FAMILY MEDICINE CLINIC | Facility: CLINIC | Age: 69
End: 2024-01-15
Payer: MEDICARE

## 2024-01-15 VITALS
WEIGHT: 262.2 LBS | OXYGEN SATURATION: 98 % | HEART RATE: 94 BPM | TEMPERATURE: 98 F | HEIGHT: 65 IN | SYSTOLIC BLOOD PRESSURE: 118 MMHG | RESPIRATION RATE: 20 BRPM | DIASTOLIC BLOOD PRESSURE: 76 MMHG | BODY MASS INDEX: 43.68 KG/M2

## 2024-01-15 DIAGNOSIS — B34.9 VIRAL SYNDROME: Primary | ICD-10-CM

## 2024-01-15 DIAGNOSIS — I10 HTN (HYPERTENSION), BENIGN: Chronic | ICD-10-CM

## 2024-01-15 DIAGNOSIS — E11.59 TYPE 2 DIABETES MELLITUS WITH OTHER CIRCULATORY COMPLICATION, WITHOUT LONG-TERM CURRENT USE OF INSULIN: ICD-10-CM

## 2024-01-15 DIAGNOSIS — E66.01 CLASS 3 SEVERE OBESITY DUE TO EXCESS CALORIES WITH SERIOUS COMORBIDITY AND BODY MASS INDEX (BMI) OF 40.0 TO 44.9 IN ADULT: ICD-10-CM

## 2024-01-15 DIAGNOSIS — E78.2 MIXED HYPERLIPIDEMIA: Chronic | ICD-10-CM

## 2024-01-15 DIAGNOSIS — Z87.891 HISTORY OF TOBACCO USE: ICD-10-CM

## 2024-01-15 DIAGNOSIS — R73.01 ELEVATED FASTING GLUCOSE: ICD-10-CM

## 2024-01-15 DIAGNOSIS — I25.10 CORONARY ARTERY DISEASE INVOLVING NATIVE CORONARY ARTERY OF NATIVE HEART WITHOUT ANGINA PECTORIS: ICD-10-CM

## 2024-01-15 PROBLEM — E66.813 CLASS 3 SEVERE OBESITY DUE TO EXCESS CALORIES WITH SERIOUS COMORBIDITY IN ADULT: Status: ACTIVE | Noted: 2024-01-15

## 2024-01-15 PROBLEM — E66.9 OBESITY (BMI 30-39.9): Status: ACTIVE | Noted: 2024-01-15

## 2024-01-15 PROBLEM — M25.522 LEFT ELBOW PAIN: Status: RESOLVED | Noted: 2021-11-16 | Resolved: 2024-01-15

## 2024-01-15 PROBLEM — E66.813 CLASS 3 SEVERE OBESITY DUE TO EXCESS CALORIES WITH SERIOUS COMORBIDITY IN ADULT: Status: RESOLVED | Noted: 2024-01-15 | Resolved: 2024-01-15

## 2024-01-15 PROBLEM — B02.9 HERPES ZOSTER: Status: RESOLVED | Noted: 2019-12-06 | Resolved: 2024-01-15

## 2024-01-15 PROBLEM — T14.8XXA HEMATOMA: Status: RESOLVED | Noted: 2019-12-06 | Resolved: 2024-01-15

## 2024-01-15 PROBLEM — M79.10 MYALGIA: Status: RESOLVED | Noted: 2019-12-06 | Resolved: 2024-01-15

## 2024-01-15 PROBLEM — I20.0 UNSTABLE ANGINA: Status: RESOLVED | Noted: 2020-08-07 | Resolved: 2024-01-15

## 2024-01-15 PROBLEM — H92.02 LEFT EAR PAIN: Status: RESOLVED | Noted: 2020-07-22 | Resolved: 2024-01-15

## 2024-01-15 PROBLEM — H65.92 LEFT SEROUS OTITIS MEDIA: Status: RESOLVED | Noted: 2021-06-10 | Resolved: 2024-01-15

## 2024-01-15 LAB
EXPIRATION DATE: ABNORMAL
HBA1C MFR BLD: 6.9 % (ref 4.5–5.7)
Lab: ABNORMAL

## 2024-01-15 PROCEDURE — 3078F DIAST BP <80 MM HG: CPT | Performed by: FAMILY MEDICINE

## 2024-01-15 PROCEDURE — 99214 OFFICE O/P EST MOD 30 MIN: CPT | Performed by: FAMILY MEDICINE

## 2024-01-15 PROCEDURE — 3044F HG A1C LEVEL LT 7.0%: CPT | Performed by: FAMILY MEDICINE

## 2024-01-15 PROCEDURE — 83036 HEMOGLOBIN GLYCOSYLATED A1C: CPT | Performed by: FAMILY MEDICINE

## 2024-01-15 PROCEDURE — 1160F RVW MEDS BY RX/DR IN RCRD: CPT | Performed by: FAMILY MEDICINE

## 2024-01-15 PROCEDURE — 3074F SYST BP LT 130 MM HG: CPT | Performed by: FAMILY MEDICINE

## 2024-01-15 PROCEDURE — 1159F MED LIST DOCD IN RCRD: CPT | Performed by: FAMILY MEDICINE

## 2024-01-15 NOTE — ASSESSMENT & PLAN NOTE
Diabetes is newly identified.   Regular aerobic exercise.  Diabetes will be reassessed in 3 months.

## 2024-01-15 NOTE — PROGRESS NOTES
Chief Complaint  URI, Hyperlipidemia, and Hypertension    Subjective     CC  Problem List  Visit Diagnosis   Encounters  Notes  Medications  Labs  Result Review Imaging  Media    John Wilburn presents to [facility] for   History of Present Illness  01/13/2024 started feeling like he had a fever and his face was flush. Did not have thermometer. Today temp was 101.5 this am. Has been having night sweats since onset.   URI   This is a new problem. The current episode started in the past 7 days (01/13/2024). The problem has been gradually worsening. The maximum temperature recorded prior to his arrival was 101 - 101.9 F. The fever has been present for 1 to 2 days. Associated symptoms include abdominal pain, diarrhea, ear pain, headaches and nausea. Pertinent negatives include no chest pain, congestion, coughing, dysuria, joint pain, joint swelling, neck pain, plugged ear sensation, rash, rhinorrhea, sinus pain, sneezing, sore throat, swollen glands, vomiting or wheezing. He has tried antihistamine for the symptoms. The treatment provided no relief.   Blood Sugar Problem  The current episode started in the past 7 days. Associated symptoms include abdominal pain, headaches and nausea. Pertinent negatives include no chest pain, congestion, coughing, neck pain, rash, sore throat, swollen glands, visual change or vomiting.     Review of Systems   HENT:  Positive for ear pain. Negative for congestion, rhinorrhea, sneezing, sore throat and swollen glands.    Respiratory:  Negative for cough and wheezing.    Cardiovascular:  Negative for chest pain.   Gastrointestinal:  Positive for abdominal pain, diarrhea and nausea. Negative for vomiting.   Genitourinary:  Negative for dysuria.   Musculoskeletal:  Negative for joint pain and neck pain.   Skin:  Negative for rash.        Objective   Vital Signs:   /76 (BP Location: Left arm, Patient Position: Sitting, Cuff Size: Adult)    "Pulse 94   Temp 98 °F (36.7 °C) (Infrared)   Resp 20   Ht 165.1 cm (65\")   Wt 119 kg (262 lb 3.2 oz)   SpO2 98% Comment: room air  BMI 43.63 kg/m²     Physical Exam  Constitutional:       General: He is not in acute distress.     Appearance: He is obese.   HENT:      Right Ear: Tympanic membrane normal.      Left Ear: Tympanic membrane normal.      Nose: No congestion.      Mouth/Throat:      Pharynx: No oropharyngeal exudate or posterior oropharyngeal erythema.   Cardiovascular:      Rate and Rhythm: Normal rate and regular rhythm.      Heart sounds: No murmur heard.  Pulmonary:      Effort: Pulmonary effort is normal.      Breath sounds: Normal breath sounds. No wheezing.   Musculoskeletal:      Cervical back: Neck supple.      Right lower leg: No edema.      Left lower leg: No edema.   Lymphadenopathy:      Cervical: No cervical adenopathy.   Skin:     Findings: No rash.   Neurological:      Mental Status: He is alert.        Result Review :Labs  Result Review  Imaging  Med Tab  Media                 Assessment and Plan CC Problem List  Visit Diagnosis  ROS  Review (Popup)  Health Maintenance  Quality  BestPractice  Medications  SmartSets  SnapShot Encounters  Media  Problem List Items Addressed This Visit          Unprioritized    HTN (hypertension), benign (Chronic)    Hyperlipidemia (Chronic)    Current Assessment & Plan     Lipid abnormalities are improving with treatment.  Pharmacotherapy as ordered.  Lipids will be reassessed in 3 months.         History of tobacco use    Class 3 severe obesity due to excess calories with serious comorbidity and body mass index (BMI) of 40.0 to 44.9 in adult    Current Assessment & Plan     Patient's (Body mass index is 43.63 kg/m².) indicates that they are morbidly/severely obese (BMI > 40 or > 35 with obesity - related health condition) with health conditions that include hypertension, coronary heart disease, diabetes mellitus, and dyslipidemias . " Weight is worsening. BMI  is above average; BMI management plan is completed. We discussed low calorie, low carb based diet program, portion control, and increasing exercise.          Elevated fasting glucose    Relevant Orders    POC Glycosylated Hemoglobin (Hb A1C) (Completed)    Coronary artery disease involving native coronary artery of native heart    Overview     S/p NSTEMI 2015 s/p angioplastycRCA and LCX  Risk factor modified   Follow with Sandella         Type 2 diabetes mellitus with circulatory disorder, without long-term current use of insulin    Overview     A1c 6.9 01/15/2023 diabetic ed, wt and lots of exercise encoruaged f/u Reinoehl 04/2024         Current Assessment & Plan     Diabetes is newly identified.   Regular aerobic exercise.  Diabetes will be reassessed in 3 months.          Other Visit Diagnoses       Viral syndrome    -  Primary    Fluids, saline flushes, humidity, tylenol, notify us if sxs don't improve in 48 hrs            Follow Up Instructions Charge Capture  Follow-up Communications  Return if symptoms worsen or fail to improve.  Patient was given instructions and counseling regarding his condition or for health maintenance advice. Please see specific information pulled into the AVS if appropriate.

## 2024-01-15 NOTE — ASSESSMENT & PLAN NOTE
Patient's (Body mass index is 43.63 kg/m².) indicates that they are morbidly/severely obese (BMI > 40 or > 35 with obesity - related health condition) with health conditions that include hypertension, coronary heart disease, diabetes mellitus, and dyslipidemias . Weight is worsening. BMI  is above average; BMI management plan is completed. We discussed low calorie, low carb based diet program, portion control, and increasing exercise.

## 2024-01-21 NOTE — PROGRESS NOTES
Subjective:     Encounter Date:01/24/2024      Patient ID: John Wilburn is a 68 y.o. male.    Chief Complaint and history of present illness:     Follow-up for CAD, PCI, hypertension, dyslipidemia, obesity with BMI of 40     History of Present Illness       Mr. Jonh Wilburn has PMH of     #  CAD,h/o MI, multiple PCIs in [place] in the past, NSTEMI and MIGUEL to RCA 6/18/15 and LCX 06/24/2015, cath 7/8/2015 Patent stents in RCA and LCX,Borderline disease in ramus intermedius and moderate disease in proximal LAD     #.  Hypertension,  #  dyslipidemia,  Lipitor intolerant, tolerating rosuvastatin  #.  PLAVIX  RESISTANT  #.  obesity, hyperglycemia with normal hemoglobin A1c, 5.6 on  7/2015  #.   Former smoker quit in 1990  #    positive family history of heart disease in father and mother  #    allergy/intolerance  to Lipitor Darvocet and Vicodin     Here for  follow-up.  Patient  denies any chest pain shortness of breath lightheadedness dizziness loss of consciousness.   Is feeling tired just getting over COVID infection.     Patient's arterial blood pressure is 139/65, heart rate 56, O2 sat of 96% on room air..    BMI is >40.        Patient  had labs done 10/19/2018 which showed cholesterol 130 triglycerides 77 HDL 40 LDL 71 CMP was unremarkable. Labs from 4/5/2019 revealed normal CMP TSH 3.74 cholesterol 132 HDL 45 LDL 67 triglycerides 114.  Labs from 8/8/2020 reveal CMP with a glucose of 111, calcium of 8.3, cholesterol 149 triglycerides 144 HDL low at 35 LDL 50.  Labs from 1/25/2021 reveal cholesterol 103 triglycerides 90 HDL low 34, LDL 46.  A1c from 8/9/2021 was 5.8.  Labs from 2/23/2023 revealed lipid profile with cholesterol 115, triglycerides 100, HDL 32, LDL 64, normal CMP except potassium 5.4 and glucose 132.  AST 48, ALT 69.  Labs from 8/15/2023 reveal A1c of 6.4     7/8/2021      ASSESSMENT:     # dyslipidemia  #  CAD history of MI and PCI  #  obesity with BMI over 40, glucose intolerance,   #  hypertension,       PLAN:     Reviewed EKG results with patient  Reviewed BMI over 40, counseled on weight loss diet and exercise.  To help with obesity as well as low HDL.  Patient was previously intolerant to Lipitor but is tolerating Crestor okay.  Will continue statin to Crestor 10 mg  Continue aspirin, carvedilol,  as tolerated  Counseled on walking exercise for low HDL.  Patient's BMI is over 40, would benefit from weight loss diet exercise  Advise labs including lipid profile.  Patient has labs done with PMD, will let him follow-up with PMD for labs.  Reviewed abnormal LFTs with patient  Patient has mild carotid plaque on carotid screening.  Will also check CT calcium score screening for CAD.  Risk-benefit alternatives discussed with        Procedures:  Lexiscan Cardiolite 7/29/2021 normal perfusion EF of 70%  Carotid Dopplers 4/6/2023 less than 50%.    AAA screening no evidence of aortic aneurysm      ECG 12 Lead    Date/Time: 1/24/2024 10:44 AM  Performed by: Sigifredo Schmidt MD    Authorized by: Sigifredo Schmidt MD  Comparison: compared with previous ECG from 1/19/2023  Comparison to previous ECG: EKG done today reviewed/interpreted by me reveals sinus bradycardia at the rate of 57 bpm, no new change compared EKG from 1/19/2023          Copied text in this portion of the note has been reviewed and is accurate as of 1/24/2024  The following portions of the patient's history were reviewed and updated as appropriate: allergies, current medications, past family history, past medical history, past social history, past surgical history and problem list.    Assessment:         MDM       Diagnosis Plan   1. CAD S/P percutaneous coronary angioplasty  CT Cardiac Calcium Score Without Dye      2. Essential hypertension  CT Cardiac Calcium Score Without Dye      3. Dyslipidemia  CT Cardiac Calcium Score Without Dye      4. Bradycardia  CT Cardiac Calcium Score Without Dye      5. Morbid obesity with  BMI of 40.0-44.9, adult  CT Cardiac Calcium Score Without Dye      6. Glucose intolerance  CT Cardiac Calcium Score Without Dye             Plan:               Past Medical History:  Past Medical History:   Diagnosis Date    Acute bronchitis     Acute myocardial infarction, subsequent episode of care     Acute non-recurrent frontal sinusitis     Acute right-sided low back pain with right-sided sciatica     Impression: persistent, long standing back pain, worse, with rle radiculopathy, h/o vertebral fx, refractory to pt xray with severe degenerative changes l5/ s1 check mri, referrall to dr amarilys arias 20 minutes bid nsaids Rehabilitation exercises discussed. continue pt    Allergic rhinitis     Allergy to poison ivy     Impression: Due to the wide spread rash he was started on Prednisone per pt request. He was advised to RTC if his symptoms did not improve.    Annual visit for general adult medical examination with abnormal findings     Impression: doing well, vaccines current Age specific anticipatory guidance and warning signs discussed. Diet, exercise, and lifestyle modification discussed. Safety, seatbelts, and routine screening examinations discussed. Discussed self-examinations.    Bronchitis     Cellulitis of buttock     Impression: possibly 2nd insect bite Topical care discussed. Discussed possible necessity of I and D. Call / return if fever, worsening symptoms.    Chest pain     Impression: atypical, improved / resolved currently possibly 2nd nausea /gi upset /viral uri serial ekg's, troponin's normal followed by cardiology in [place], he reports negative treadmill cardiolyte 12/11 d/c to home, Follow up 2 to 3 days. Follow up with cardiology planned consider repeat stress test if chest pain on exertion, worsening symptoms.    Colon cancer screening     Impression: has a colonoscopy, will get records    Conjunctivitis     Impression: viral Topical care discussed. wash hands frequently    Coronary  atherosclerosis     Impression: h/o stent 2003, 2016 followed by meng, had recent neg stress test, will get records h/o normal carptid doppler per her report, will get records continue coreg, statin, effient continue risk factor reduction recommend cardiology Follow up he states h3 will consider    COVID-19     Detached retina     GERD (gastroesophageal reflux disease)     Hematoma     Impression: hand, much improved xray neg for fracture per ed Signs and symptoms of infecton discussed. Call / return if worsening symptoms.    Hepatitis B     Impression: h/o, recheck levels    Herpes zoster     Impression: topical care discussed cover with antibiotics Follow up for zostavax    History of tobacco use     HL (hearing loss) 12/28/1979    [employer]    HTN (hypertension), benign     Impression: good control Discussed low sodium diet, lifestyle modification. Follow up recheck    Hyperlipidemia     Impression: followed by Roxana improved tolerating crestor rx, ldl to 71, + aggressive ldl target considering cad recheck    Lumbar disc disease     Impression: followed by Nick Ashton undergoing epidual injxn    Malaise and fatigue     Impression: check vitamin levels h/o low t do not recommend replacement consider age enedelia under good cobtrol consider wellbutrin Follow up recheck Call / return if worsening symptoms.    Myalgia     Impression: possibly 2nd crestor, hold x 1 to 2 mos risk/benefit RX discussed, considering restart possibly at lower dose    Nevus     Impression: path shows benign lentigo Discussed skin care, use of sun block and protective clothing.    Obesity     Obesity, morbid, BMI 40.0-49.9     DOCUMENTATION OF FOLLOW-UP PLAN FOR PATIENT WITH BMI ABOVE NORMAL ()    Overweight (BMI 25.0-29.9)     Impression: Discussed diet, exercise, lifestyle modification. Follow up [id number]    Pruritus     Right hip pain     Impression: check xray    Screening for depression     Negative Depression Screening (4 or less)  ()    Screening PSA (prostate specific antigen)     Impression: psa normal / damián normal    Sinusitis, bacterial     Impression: He was prescribed Cipro and Tessalon perles. Increase fluids. Tylenol/motrin for pain or fever. Medication and medication adverse effects discussed. Follow-up 5-7 days for reevaluation if not improved or sooner if needed.    Skin lesion     Impression: rec resection / derm ref sched    Sleep apnea, obstructive     Story: on CPAP    URI (upper respiratory infection)     Impression: Increase fluid intake. Mucinex Call / return if fever, respiratory difficulty, worsening symptoms.    Vertiginous syndrome and labyrinthine disorder     Impression: Differential diagnosis discussed. Follow-up in 2 weeks if not better. Follow-up sooner for worsening symptoms or for any concerns. Zyrtec as directed.    Vertigo     Impression: likely secondary to sinusitis with eustachian tube dysfunction, rx in progress ddx includes vestibular neuritis, cover with prednisone Follow up recheck Consider imaging if persistent symptoms.    Vitamin D deficiency      Past Surgical History:  Past Surgical History:   Procedure Laterality Date    CATARACT EXTRACTION, BILATERAL Bilateral     CORONARY STENT PLACEMENT  2003    EYE SURGERY  10/16/2019    repair detached retina    NASAL SEPTUM SURGERY        Allergies:  Allergies   Allergen Reactions    Vicodin [Hydrocodone-Acetaminophen] Hives    Atorvastatin Hives    Propoxyphene Hives     Home Meds:  Current Meds:     Current Outpatient Medications:     aspirin 81 MG EC tablet, Take 1 tablet by mouth Daily., Disp: 90 tablet, Rfl: 3    carvedilol (COREG) 25 MG tablet, Take 1 tablet by mouth 2 (Two) Times a Day., Disp: 180 tablet, Rfl: 3    cetirizine (zyrTEC) 10 MG tablet, Take 1 tablet by mouth Daily., Disp: 30 tablet, Rfl: 1    Cholecalciferol (VITAMIN D) 50 MCG (2000 UT) tablet, Take 1 tablet by mouth 2 (two) times a day., Disp: , Rfl:     cyclobenzaprine (FLEXERIL) 10  MG tablet, Take 1 tablet by mouth At Night As Needed for Muscle Spasms., Disp: 90 tablet, Rfl: 2    fluticasone (FLONASE) 50 MCG/ACT nasal spray, 1-2 sprays in each nostril once daily for allergies, Disp: 18.2 g, Rfl: 2    losartan (COZAAR) 25 MG tablet, Take 1 tablet by mouth Daily., Disp: 90 tablet, Rfl: 3    nitroglycerin (NITROLINGUAL) 0.4 MG/SPRAY spray, Place 1 spray under the tongue Every 5 (Five) Minutes As Needed for Chest Pain., Disp: 1 each, Rfl: 0    rosuvastatin (CRESTOR) 20 MG tablet, Take 1 tablet by mouth Daily., Disp: 90 tablet, Rfl: 3  Social History:   Social History     Tobacco Use    Smoking status: Former     Packs/day: 0.50     Years: 15.00     Additional pack years: 0.00     Total pack years: 7.50     Types: Cigarettes     Start date: 1975     Quit date: 1990     Years since quittin.0    Smokeless tobacco: Never   Substance Use Topics    Alcohol use: Yes     Alcohol/week: 2.0 standard drinks of alcohol     Types: 2 Cans of beer per week      Family History:  Family History   Problem Relation Age of Onset    Hypertension Mother     Diabetes Mother             Heart disease Mother     Heart attack Father     Rheumatic fever Father     No Known Problems Sister     No Known Problems Brother     No Known Problems Maternal Aunt     No Known Problems Maternal Uncle     No Known Problems Paternal Aunt     No Known Problems Paternal Uncle     No Known Problems Maternal Grandmother     No Known Problems Maternal Grandfather     No Known Problems Paternal Grandmother     No Known Problems Paternal Grandfather     No Known Problems Other     Anemia Neg Hx     Arrhythmia Neg Hx     Asthma Neg Hx     Clotting disorder Neg Hx     Fainting Neg Hx     Heart failure Neg Hx     Hyperlipidemia Neg Hx               Review of Systems   Constitutional: Negative for malaise/fatigue.   Cardiovascular:  Negative for chest pain, leg swelling and palpitations.   Respiratory:  Negative for shortness  "of breath.    Skin:  Negative for rash.   Neurological:  Negative for dizziness, light-headedness and numbness.     All other systems are negative         Objective:     Physical Exam  /65   Pulse 56   Ht 165.1 cm (65\")   Wt 116 kg (255 lb)   SpO2 96%   BMI 42.43 kg/m²   General:  Appears in no acute distress  Eyes: Sclera is anicteric,  conjunctiva is clear   HEENT:  No JVD.  No carotid bruits  Respiratory: Respirations regular and unlabored at rest.  Clear to auscultation  Cardiovascular: S1,S2 Regular rate and rhythm. .   Extremities: No digital clubbing or cyanosis, no edema  Skin: Color pink. Skin warm and dry to touch. No rashes  No xanthoma  Neuro: Alert and awake.    Lab Reviewed:         Sigifredo Schmidt MD  1/24/2024 10:52 EST      [facility] Dragon/Transcription:   \"Dictated utilizing Dragon dictation\".        "

## 2024-01-24 ENCOUNTER — OFFICE VISIT (OUTPATIENT)
Dept: CARDIOLOGY | Facility: CLINIC | Age: 69
End: 2024-01-24
Payer: MEDICARE

## 2024-01-24 VITALS
OXYGEN SATURATION: 96 % | SYSTOLIC BLOOD PRESSURE: 139 MMHG | WEIGHT: 255 LBS | DIASTOLIC BLOOD PRESSURE: 65 MMHG | BODY MASS INDEX: 42.49 KG/M2 | HEART RATE: 56 BPM | HEIGHT: 65 IN

## 2024-01-24 DIAGNOSIS — E66.01 MORBID OBESITY WITH BMI OF 40.0-44.9, ADULT: ICD-10-CM

## 2024-01-24 DIAGNOSIS — I10 ESSENTIAL HYPERTENSION: ICD-10-CM

## 2024-01-24 DIAGNOSIS — R00.1 BRADYCARDIA: ICD-10-CM

## 2024-01-24 DIAGNOSIS — Z98.61 CAD S/P PERCUTANEOUS CORONARY ANGIOPLASTY: Primary | ICD-10-CM

## 2024-01-24 DIAGNOSIS — I25.10 CAD S/P PERCUTANEOUS CORONARY ANGIOPLASTY: Primary | ICD-10-CM

## 2024-01-24 DIAGNOSIS — E78.5 DYSLIPIDEMIA: ICD-10-CM

## 2024-01-24 DIAGNOSIS — E74.39 GLUCOSE INTOLERANCE: ICD-10-CM

## 2024-01-24 PROCEDURE — 93000 ELECTROCARDIOGRAM COMPLETE: CPT | Performed by: INTERNAL MEDICINE

## 2024-01-24 PROCEDURE — 99214 OFFICE O/P EST MOD 30 MIN: CPT | Performed by: INTERNAL MEDICINE

## 2024-01-24 PROCEDURE — 1159F MED LIST DOCD IN RCRD: CPT | Performed by: INTERNAL MEDICINE

## 2024-01-24 PROCEDURE — 3078F DIAST BP <80 MM HG: CPT | Performed by: INTERNAL MEDICINE

## 2024-01-24 PROCEDURE — 1160F RVW MEDS BY RX/DR IN RCRD: CPT | Performed by: INTERNAL MEDICINE

## 2024-01-24 PROCEDURE — 3075F SYST BP GE 130 - 139MM HG: CPT | Performed by: INTERNAL MEDICINE

## 2024-01-29 DIAGNOSIS — E11.59 TYPE 2 DIABETES MELLITUS WITH OTHER CIRCULATORY COMPLICATION, WITHOUT LONG-TERM CURRENT USE OF INSULIN: Primary | ICD-10-CM

## 2024-03-04 NOTE — PROGRESS NOTES
Subjective   John Wilburn is a 68 y.o. male.     Chief Complaint   Patient presents with    Medicare Wellness-subsequent    Hypertension    Hyperlipidemia    Diabetes    Abdominal Pain       History of Present Illness  A1C was done 1/14/24 and was 6.9  Hyperlipidemia  This is a chronic problem. The current episode started more than 1 year ago. Recent lipid tests were reviewed and are low. Pertinent negatives include no chest pain, focal weakness, leg pain or shortness of breath. Current antihyperlipidemic treatment includes statins. The current treatment provides mild improvement of lipids. There are no compliance problems.  Risk factors for coronary artery disease include dyslipidemia, hypertension, male sex and obesity.   Hypertension  This is a chronic problem. The current episode started more than 1 year ago. The problem is unchanged. Pertinent negatives include no anxiety, blurred vision, chest pain, headaches, malaise/fatigue, palpitations, shortness of breath or sweats. There are no associated agents to hypertension. Risk factors for coronary artery disease include male gender, dyslipidemia and obesity. Past treatments include lifestyle changes. Current antihypertension treatment includes beta blockers and ACE inhibitors. The current treatment provides moderate improvement. There are no compliance problems.    Diabetes  He presents for his follow-up diabetic visit. He has type 2 diabetes mellitus. Pertinent negatives for hypoglycemia include no headaches, pallor, seizures or sweats. Pertinent negatives for diabetes include no blurred vision, no chest pain, no polydipsia and no polyphagia. Current diabetic treatment includes oral agent (monotherapy). Meal planning includes avoidance of concentrated sweets. He participates in exercise intermittently. (John checks his blood sugar occasionally if he is having fatigue or concerns of possible blood sugar issues ) An ACE inhibitor/angiotensin II receptor blocker  is not being taken.            I personally reviewed and updated the patient's allergies, medications, problem list, and past medical, surgical, social, and family history. I have reviewed and confirmed the accuracy of the History of Present Illness and Review of Symptoms as documented by the MA/LPN/RN. Kian Weston MD    Family History   Problem Relation Age of Onset    Hypertension Mother     Diabetes Mother             Heart disease Mother     Heart attack Father     Rheumatic fever Father     No Known Problems Sister     No Known Problems Brother     No Known Problems Maternal Aunt     No Known Problems Maternal Uncle     No Known Problems Paternal Aunt     No Known Problems Paternal Uncle     No Known Problems Maternal Grandmother     No Known Problems Maternal Grandfather     No Known Problems Paternal Grandmother     No Known Problems Paternal Grandfather     No Known Problems Other     Anemia Neg Hx     Arrhythmia Neg Hx     Asthma Neg Hx     Clotting disorder Neg Hx     Fainting Neg Hx     Heart failure Neg Hx     Hyperlipidemia Neg Hx        Social History     Tobacco Use    Smoking status: Former     Current packs/day: 0.00     Average packs/day: 0.5 packs/day for 15.0 years (7.5 ttl pk-yrs)     Types: Cigarettes     Start date: 1975     Quit date: 1990     Years since quittin.2     Passive exposure: Past    Smokeless tobacco: Never   Vaping Use    Vaping status: Never Used   Substance Use Topics    Alcohol use: Not Currently     Alcohol/week: 2.0 standard drinks of alcohol     Types: 2 Cans of beer per week    Drug use: No       Past Surgical History:   Procedure Laterality Date    CATARACT EXTRACTION, BILATERAL Bilateral     CORONARY STENT PLACEMENT      EYE SURGERY  10/16/2019    repair detached retina    NASAL SEPTUM SURGERY         Patient Active Problem List   Diagnosis    Acute myocardial infarction, subsequent episode of care    Acute right-sided low back pain with  right-sided sciatica    Allergic rhinitis    Coronary angioplasty status    Edema    Welcome to Medicare preventive visit    Colon cancer screening    Screening PSA (prostate specific antigen)    GERD (gastroesophageal reflux disease)    Hepatitis B    History of tobacco use    HTN (hypertension), benign    Hyperlipidemia    Lumbar disc disease    Vertiginous syndrome and labyrinthine disorder    Nevus    Obstructive sleep apnea    Class 3 severe obesity due to excess calories with serious comorbidity and body mass index (BMI) of 40.0 to 44.9 in adult    Right hip pain    Vitamin D deficiency    Tendinitis of right rotator cuff    Chest pain in adult    Elevated fasting glucose    Coronary artery disease involving native coronary artery of native heart    Bradycardia    Screening for malignant neoplasm of colon    History of retinal detachment    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    Type 2 diabetes mellitus with circulatory disorder, without long-term current use of insulin         Current Outpatient Medications:     aspirin 81 MG EC tablet, Take 1 tablet by mouth Daily., Disp: 90 tablet, Rfl: 3    carvedilol (COREG) 25 MG tablet, Take 1 tablet by mouth 2 (Two) Times a Day., Disp: 180 tablet, Rfl: 3    cetirizine (zyrTEC) 10 MG tablet, Take 1 tablet by mouth Daily., Disp: 30 tablet, Rfl: 1    Cholecalciferol (VITAMIN D) 50 MCG (2000 UT) tablet, Take 1 tablet by mouth 2 (two) times a day., Disp: , Rfl:     cyclobenzaprine (FLEXERIL) 10 MG tablet, Take 1 tablet by mouth At Night As Needed for Muscle Spasms., Disp: 90 tablet, Rfl: 2    fluticasone (FLONASE) 50 MCG/ACT nasal spray, 1-2 sprays in each nostril once daily for allergies, Disp: 18.2 g, Rfl: 2    losartan (COZAAR) 25 MG tablet, Take 1 tablet by mouth Daily., Disp: 90 tablet, Rfl: 3    metFORMIN (GLUCOPHAGE) 500 MG tablet, Take 1 tablet by mouth Daily With Breakfast., Disp: 90 tablet, Rfl: 3    nitroglycerin (NITROLINGUAL) 0.4 MG/SPRAY spray, Place 1 spray  "under the tongue Every 5 (Five) Minutes As Needed for Chest Pain., Disp: 1 each, Rfl: 0    rosuvastatin (CRESTOR) 20 MG tablet, Take 1 tablet by mouth Daily., Disp: 90 tablet, Rfl: 3    ciprofloxacin (CIPRO) 500 MG tablet, Take 1 tablet by mouth 2 (Two) Times a Day., Disp: 30 tablet, Rfl: 0    metroNIDAZOLE (FLAGYL) 500 MG tablet, Take 1 tablet by mouth 3 (Three) Times a Day for 15 days. Do not use alcohol for at least 24 hours after the last dose., Disp: 45 tablet, Rfl: 0          Review of Systems   Constitutional:  Negative for chills, diaphoresis and malaise/fatigue.   Eyes:  Negative for blurred vision and visual disturbance.   Respiratory:  Negative for shortness of breath.    Cardiovascular:  Negative for chest pain and palpitations.   Gastrointestinal:  Negative for abdominal pain and nausea.   Endocrine: Negative for polydipsia and polyphagia.   Musculoskeletal:  Negative for neck stiffness.   Skin:  Negative for color change and pallor.   Neurological:  Negative for focal weakness, seizures and syncope.   Hematological:  Negative for adenopathy.   Psychiatric/Behavioral:  Negative for hallucinations and suicidal ideas.        I have reviewed and confirmed the accuracy of the ROS as documented by the MA/LPN/RN Kian Weston MD      Objective   /78 (BP Location: Right arm, Patient Position: Sitting, Cuff Size: Large Adult)   Pulse 85   Temp 98.7 °F (37.1 °C)   Resp 22   Ht 165.1 cm (65\")   Wt 115 kg (253 lb)   SpO2 96%   BMI 42.10 kg/m²   BP Readings from Last 3 Encounters:   03/15/24 122/72   03/05/24 130/78   01/24/24 139/65     Wt Readings from Last 3 Encounters:   03/15/24 113 kg (250 lb 3.2 oz)   03/05/24 115 kg (253 lb)   01/24/24 116 kg (255 lb)           Data / Lab Results:    Hemoglobin A1C   Date Value Ref Range Status   01/15/2024 6.9 (A) 4.5 - 5.7 % Final   08/15/2023 6.4 % Final   03/06/2023 6.7 % Final        Lab Results   Component Value Date    LDL 35 03/01/2024    LDL 64 " "02/23/2023    LDL 69 01/28/2022     Lab Results   Component Value Date    CHOL 114 08/08/2020    CHOL 132 04/05/2019    CHOL 130 10/19/2018     Lab Results   Component Value Date    TRIG 103 03/01/2024    TRIG 100 02/23/2023    TRIG 178 (H) 01/28/2022     Lab Results   Component Value Date    HDL 28 (L) 03/01/2024    HDL 32 (L) 02/23/2023    HDL 35 (L) 01/28/2022     Lab Results   Component Value Date    PSA 0.5 03/01/2024    PSA 0.3 02/23/2023    PSA 0.3 01/28/2022     Lab Results   Component Value Date    WBC 8.9 03/05/2024    HGB 14.2 03/05/2024    HCT 41.4 03/05/2024    MCV 89 03/05/2024     03/05/2024     Lab Results   Component Value Date    TSH 1.880 03/01/2024      Lab Results   Component Value Date    GLUCOSE 106 (H) 03/05/2024    BUN 15 03/05/2024    CREATININE 0.99 03/05/2024    EGFRIFNONA 68 01/28/2022    EGFRIFAFRI 79 01/28/2022    BCR 15 03/05/2024    K 4.7 03/05/2024    CO2 26 03/05/2024    CALCIUM 9.5 03/05/2024    PROTENTOTREF 7.1 03/05/2024    ALBUMIN 4.7 03/05/2024    LABIL2 2.0 03/05/2024    AST 25 03/05/2024    ALT 37 03/05/2024     No results found for: \"MAT\", \"RF\", \"SEDRATE\"   No results found for: \"CRP\"   No results found for: \"IRON\", \"TIBC\", \"FERRITIN\"   Lab Results   Component Value Date    UNAHVYDQ36 481 04/03/2018            Physical Exam  Constitutional:       Appearance: He is well-developed. He is not diaphoretic.   HENT:      Head: Normocephalic.      Right Ear: Tympanic membrane, ear canal and external ear normal.      Left Ear: Tympanic membrane, ear canal and external ear normal.      Nose: Nose normal.   Eyes:      General: Lids are normal.      Extraocular Movements:      Right eye: No nystagmus.      Left eye: No nystagmus.      Conjunctiva/sclera: Conjunctivae normal.      Right eye: Right conjunctiva is not injected. No hemorrhage.     Left eye: Left conjunctiva is not injected. No hemorrhage.     Pupils: Pupils are equal, round, and reactive to light.      Funduscopic " exam:     Right eye: No hemorrhage, exudate, AV nicking, arteriolar narrowing or papilledema.         Left eye: No hemorrhage, exudate, AV nicking, arteriolar narrowing or papilledema.   Neck:      Thyroid: No thyromegaly.      Vascular: No carotid bruit or JVD.      Trachea: No tracheal deviation.   Cardiovascular:      Rate and Rhythm: Normal rate and regular rhythm.      Heart sounds: Normal heart sounds. No murmur heard.     No friction rub. No gallop.   Pulmonary:      Effort: Pulmonary effort is normal.      Breath sounds: Normal breath sounds. No stridor. No decreased breath sounds, wheezing or rales.   Abdominal:      General: Bowel sounds are normal. There is no distension.      Palpations: Abdomen is soft. There is no mass.      Tenderness: There is no abdominal tenderness. There is no guarding or rebound.      Hernia: No hernia is present.   Lymphadenopathy:      Head:      Right side of head: No submental, submandibular, tonsillar, preauricular, posterior auricular or occipital adenopathy.      Left side of head: No submental, submandibular, tonsillar, preauricular, posterior auricular or occipital adenopathy.      Cervical: No cervical adenopathy.   Skin:     General: Skin is warm and dry.      Coloration: Skin is not pale.   Neurological:      Mental Status: He is alert and oriented to person, place, and time.      Cranial Nerves: No cranial nerve deficit.      Sensory: No sensory deficit.      Coordination: Coordination normal.      Gait: Gait normal.      Deep Tendon Reflexes: Reflexes are normal and symmetric.           Assessment & Plan      Medications        Problem List         LOS      Medicare wellness. doing well, vaccines current.  Recommend update tetanus vaccine at the pharmacy.  Baby aspirin daily.  Discussed health maintenance, screening test, lifestyle modification.  AAA ultrasound scheduled.  Coronary artery disease.  Stable, followed by cardiology Dr. escobedo.  History of repeat  stenting 2015.  Cardiac stress testing benign 2021.  Continue risk factor reduction.  Remote history of carotid Dopplers, recommend repeat, he declined secondary to cost but states he will consider in future.  Hypertension.  Good control.  Tolerating carvedilol.  Discussed low-sodium diet.  Type 2 diabetes.  New onset, A1c to 6.9.  Tolerating metformin.  Discussed diet, exercise and lifestyle modification, follow-up recheck A1c.  He is working on diet, has lost weight.  Consider add Ozempic.  Hyperlipidemia.  Improved on Crestor.  Plans to start weight watchers, follow-up recheck fasting labs.  Retinal detachment.  December/2019.  Improved status post surgery.  Followed by ophthalmology.  Lumbar disc disease.  Flare currently/lumbar muscle sprain, no radiculopathy.  Continue muscle relaxants, rehabilitation exercises discussed, consider PT.  Start prednisone.  Follow-up recheck.  Consider imaging if persistent symptoms.  Followed by pain management/neurosurgery, has had repeat imagery..  s/p course epidural, consider radiofrequency ablation.  Prostate screening.  Follow-up check PSA.  GERD.  Stable on PPI.  EGD benign/dilation only 2019.  Colon cancer screening.  Colonoscopy benign 2019.  Repeat eval 10 years.  IRENE.  Stable on CPAP.  Rotator cuff tendinitis.    Right.  Much improved today status post injection.  Rehabilitation exercises discussed.  Consider imaging, Ortho referral if persistent symptoms.  Elevated fasting blood sugar.  Discussed diet, exercise lifestyle modification.  Improved today, A1c to 6.4.  Discussed diet, exercise [place] modification.  Follow-up 3 to 4 months.  Plan start Rx if persistent elevation.  Vocal tic.  Mild, infrequent symptoms currently.  Consider guanfacine.  Tremor.  Intermittent, benign exam today.  TSH normal.  Possibly secondary to benign physiologic tremor.  Follow-up recheck.  Consider further eval if worsening symptoms.  OA left shoulder.  Ice, Tylenol, rehabilitation  exercise discussed.  Continue as needed nightly muscle relaxant.  Consider imaging if persistent symptoms.  Joint injection declined.  Carpal tunnel.  Improved with nightly bracing.  COVID-19 vaccine status: Vaccinated.  Recommend booster.   Lateral epicondylitis.  Improved today status post injection. Ice, rehabilitation exercise discussed.  Counterforce bracing.  Call return if persistent symptoms.  COVID-19.  With cough, fever, no respiratory difficulty.  Has been vaccinated.  Significant bacterial sinusitis on exam, start antibiotics/prednisone/antivirals.  Continue coated baby aspirin daily.  Quarantine.  Signs symptom progression severe disease discussed, call or return if worsening symptoms.  Suprapubic/left lower quadrant pain.  Likely secondary to diverticulitis, start antibiotics.  Check CBC.  DDx includes nephrolithiasis.  Check CT scan.  Close follow-up scheduled.  Possible necessity admission for IV antibiotics discussed.  Call return if fever, unable keep fluids down, worsening symptoms.        Diagnoses and all orders for this visit:    1. Initial Medicare annual wellness visit (Primary)    2. HTN (hypertension), benign  -     CBC & Differential  -     Comprehensive Metabolic Panel  -     Lipase  -     C-reactive Protein    3. Mixed hyperlipidemia    4. Screening PSA (prostate specific antigen)    5. Type 2 diabetes mellitus with other circulatory complication, without long-term current use of insulin  -     POCT microalbumin  -     POCT urinalysis dipstick, manual  -     CBC & Differential  -     Comprehensive Metabolic Panel  -     Lipase  -     C-reactive Protein    6. Class 3 severe obesity due to excess calories with serious comorbidity and body mass index (BMI) of 40.0 to 44.9 in adult    7. History of tobacco use    8. Dysuria  -     POCT urinalysis dipstick, manual  -     CBC & Differential  -     Comprehensive Metabolic Panel  -     Lipase  -     C-reactive Protein  -     cefTRIAXone  (ROCEPHIN) injection 1 g  -     ciprofloxacin (CIPRO) 500 MG tablet; Take 1 tablet by mouth 2 (Two) Times a Day.  Dispense: 30 tablet; Refill: 0  -     metroNIDAZOLE (FLAGYL) 500 MG tablet; Take 1 tablet by mouth 3 (Three) Times a Day for 15 days. Do not use alcohol for at least 24 hours after the last dose.  Dispense: 45 tablet; Refill: 0  -     Urine Culture - Urine, Urine, Random Void    9. Need for immunization against influenza    10. Left lower quadrant abdominal pain  -     CT Abdomen Pelvis With Contrast  -     CBC & Differential  -     Comprehensive Metabolic Panel  -     Lipase  -     C-reactive Protein  -     cefTRIAXone (ROCEPHIN) injection 1 g  -     ciprofloxacin (CIPRO) 500 MG tablet; Take 1 tablet by mouth 2 (Two) Times a Day.  Dispense: 30 tablet; Refill: 0  -     metroNIDAZOLE (FLAGYL) 500 MG tablet; Take 1 tablet by mouth 3 (Three) Times a Day for 15 days. Do not use alcohol for at least 24 hours after the last dose.  Dispense: 45 tablet; Refill: 0  -     Urine Culture - Urine, Urine, Random Void      Class 3 Severe Obesity (BMI >=40). Obesity-related health conditions include the following: hypertension, diabetes mellitus, and dyslipidemias. Obesity is unchanged. BMI is is above average; BMI management plan is completed. We discussed portion control and increasing exercise.       Expected course, medications, and adverse effects discussed.  Call or return if worsening or persistent symptoms.  I wore protective equipment throughout this patient encounter including a mask, gloves, and eye protection.  Hand hygiene was performed before donning protective equipment and after removal when leaving the room.  The complete contents of the Assessment and Plan and Data/Labs Results as documented above have been reviewed and addressed by myself with the patient today as part of an ongoing evaluation / treatment plan.  If some of the documentation has been copied from a previous note and is unchanged it  indicates that this problem / plan has been assessed today but is stable from a previous visit and no changes have been recommended.

## 2024-03-04 NOTE — PROGRESS NOTES
Subjective   John Wilburn is a 68 y.o. male.     Chief Complaint   Patient presents with    Medicare Wellness-subsequent    Hypertension    Hyperlipidemia    Diabetes    Abdominal Pain       The patient is here: for coordination of medical care to discuss health maintenance and disease prevention to follow up on multiple medical problems.  Last comprehensive physical was on 3/6/23.  Previous physical was performed by  Kian Weston MD  Overall has: moderate activity with work/home activities, exercises 1 time per week, good appetite, feels well with minor complaints, good energy level, and is sleeping well. Nutrition: eating a variety of foods. Last tetanus shot was unknown. Patient's last carotid doppler was:  23  Patient's last stress test was:  21  Patient's last PSA was:  3/1/24 0.5    Last Completed Colonoscopy            COLORECTAL CANCER SCREENING (COLONOSCOPY - Every 10 Years) Next due on 2019   Colonoscopy component of  COLONOSCOPY    2019  SCANNED - COLONOSCOPY                    History of Present Illness     Recent Hospitalizations:  No hospitalization(s) within the last year..  ccc    I personally reviewed and updated the patient's allergies, medications, problem list, and past medical, surgical, social, and family history. I have reviewed and confirmed the accuracy of the HPI and ROS as documented by the MA/LPN/RN Suzie Shannon MA      Family History   Problem Relation Age of Onset    Hypertension Mother     Diabetes Mother             Heart disease Mother     Heart attack Father     Rheumatic fever Father     No Known Problems Sister     No Known Problems Brother     No Known Problems Maternal Aunt     No Known Problems Maternal Uncle     No Known Problems Paternal Aunt     No Known Problems Paternal Uncle     No Known Problems Maternal Grandmother     No Known Problems Maternal Grandfather     No Known Problems Paternal Grandmother     No  Known Problems Paternal Grandfather     No Known Problems Other     Anemia Neg Hx     Arrhythmia Neg Hx     Asthma Neg Hx     Clotting disorder Neg Hx     Fainting Neg Hx     Heart failure Neg Hx     Hyperlipidemia Neg Hx        Social History     Tobacco Use    Smoking status: Former     Current packs/day: 0.00     Average packs/day: 0.5 packs/day for 15.0 years (7.5 ttl pk-yrs)     Types: Cigarettes     Start date: 1975     Quit date: 1990     Years since quittin.1     Passive exposure: Past    Smokeless tobacco: Never   Vaping Use    Vaping status: Never Used   Substance Use Topics    Alcohol use: Yes     Alcohol/week: 2.0 standard drinks of alcohol     Types: 2 Cans of beer per week    Drug use: No       Past Surgical History:   Procedure Laterality Date    CATARACT EXTRACTION, BILATERAL Bilateral     CORONARY STENT PLACEMENT      EYE SURGERY  10/16/2019    repair detached retina    NASAL SEPTUM SURGERY         Patient Active Problem List   Diagnosis    Acute myocardial infarction, subsequent episode of care    Acute right-sided low back pain with right-sided sciatica    Allergic rhinitis    Coronary angioplasty status    Edema    Welcome to Medicare preventive visit    Colon cancer screening    Screening PSA (prostate specific antigen)    GERD (gastroesophageal reflux disease)    Hepatitis B    History of tobacco use    HTN (hypertension), benign    Hyperlipidemia    Lumbar disc disease    Vertiginous syndrome and labyrinthine disorder    Nevus    Obstructive sleep apnea    Class 3 severe obesity due to excess calories with serious comorbidity and body mass index (BMI) of 40.0 to 44.9 in adult    Right hip pain    Vitamin D deficiency    Tendinitis of right rotator cuff    Chest pain in adult    Elevated fasting glucose    Coronary artery disease involving native coronary artery of native heart    Bradycardia    Screening for malignant neoplasm of colon    History of retinal detachment     "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    Type 2 diabetes mellitus with circulatory disorder, without long-term current use of insulin         Current Outpatient Medications:     aspirin 81 MG EC tablet, Take 1 tablet by mouth Daily., Disp: 90 tablet, Rfl: 3    carvedilol (COREG) 25 MG tablet, Take 1 tablet by mouth 2 (Two) Times a Day., Disp: 180 tablet, Rfl: 3    cetirizine (zyrTEC) 10 MG tablet, Take 1 tablet by mouth Daily., Disp: 30 tablet, Rfl: 1    Cholecalciferol (VITAMIN D) 50 MCG (2000 UT) tablet, Take 1 tablet by mouth 2 (two) times a day., Disp: , Rfl:     cyclobenzaprine (FLEXERIL) 10 MG tablet, Take 1 tablet by mouth At Night As Needed for Muscle Spasms., Disp: 90 tablet, Rfl: 2    fluticasone (FLONASE) 50 MCG/ACT nasal spray, 1-2 sprays in each nostril once daily for allergies, Disp: 18.2 g, Rfl: 2    losartan (COZAAR) 25 MG tablet, Take 1 tablet by mouth Daily., Disp: 90 tablet, Rfl: 3    metFORMIN (GLUCOPHAGE) 500 MG tablet, Take 1 tablet by mouth Daily With Breakfast., Disp: 90 tablet, Rfl: 3    nitroglycerin (NITROLINGUAL) 0.4 MG/SPRAY spray, Place 1 spray under the tongue Every 5 (Five) Minutes As Needed for Chest Pain., Disp: 1 each, Rfl: 0    rosuvastatin (CRESTOR) 20 MG tablet, Take 1 tablet by mouth Daily., Disp: 90 tablet, Rfl: 3      Objective   Pulse 85   Temp 98.7 °F (37.1 °C)   Resp 22   Ht 165.1 cm (65\")   Wt 115 kg (253 lb)   SpO2 96%   BMI 42.10 kg/m²   BP Readings from Last 3 Encounters:   01/24/24 139/65   01/15/24 118/76   12/19/23 146/66     Wt Readings from Last 3 Encounters:   03/05/24 115 kg (253 lb)   01/24/24 116 kg (255 lb)   01/15/24 119 kg (262 lb 3.2 oz)         Age-appropriate Screening Schedule:  Refer to the list below for future screening recommendations based on patient's age, sex and/or medical conditions. Orders for these [facility] tests are listed in the plan section. The patient has been provided with a written plan.    Health Maintenance   Topic Date Due "    URINE MICROALBUMIN  Never done    TDAP/TD VACCINES (1 - Tdap) Never done    ZOSTER VACCINE (1 of 2) Never done    RSV Vaccine - Adults (1 - 1-dose 60+ series) Never done    DIABETIC FOOT EXAM  Never done    INFLUENZA VACCINE  08/01/2023    COVID-19 Vaccine (3 - 2023-24 season) 09/01/2023    DIABETIC EYE EXAM  10/21/2023    ANNUAL WELLNESS VISIT  03/06/2024    HEMOGLOBIN A1C  07/15/2024    LIPID PANEL  03/01/2025    BMI FOLLOWUP  03/05/2025    COLORECTAL CANCER SCREENING  05/16/2029    HEPATITIS C SCREENING  Completed    Pneumococcal Vaccine 65+  Completed    AAA SCREEN (ONE-TIME)  Completed       Depression Screen:       3/5/2024     9:57 AM   PHQ-2/PHQ-9 Depression Screening   Little Interest or Pleasure in Doing Things 0-->not at all   Feeling Down, Depressed or Hopeless 0-->not at all   PHQ-9: Brief Depression Severity Measure Score 0     I spent more than 16 minutes asking patient questions, counseling and documenting in the chart.    Health Habits and Functional and Cognitive Screening:      3/6/2023     8:00 AM   Functional & Cognitive Status   Do you have difficulty preparing food and eating? No   Do you have difficulty bathing yourself, getting dressed or grooming yourself? No   Do you have difficulty using the toilet? No   Do you have difficulty moving around from place to place? No   Do you have trouble with steps or getting out of a bed or a chair? No   Current Diet Well Balanced Diet   Dental Exam Up to date   Eye Exam Up to date   Exercise (times per week) 3 times per week   Current Exercises Include Walking;Yard Work   Do you need help using the phone?  No   Are you deaf or do you have serious difficulty hearing?  No   Do you need help to go to places out of walking distance? No   Do you need help shopping? No   Do you need help preparing meals?  No   Do you need help with housework?  No   Do you need help with laundry? No   Do you need help taking your medications? No   Do you need help managing  money? No   Do you ever drive or ride in a car without wearing a seat belt? No   Have you felt unusual stress, anger or loneliness in the last month? No   Who do you live with? Spouse   If you need help, do you have trouble finding someone available to you? No   Do you have difficulty concentrating, remembering or making decisions? No       Does the patient have evidence of cognitive impairment? No    Advance Care Planning:  ACP discussion was held with the patient during this visit. Patient does not have an advance directive, declines further assistance.     A face-to-face visit was completed today with patient.  Counseling explanation, and discussion of advanced directives was performed.   The last advanced care visit was performed in 2023.  In a near life ending situation, from which he is not expected to recover functionally, and he is not able to speak for him, he does not want life sustaining measures. We discussed feeding tubes, ventilators and cardiac support as well as dialysis.    I spent more than 16 minutes discussing Advanced Care Planning information and documenting in the chart.    Identification of Risk Factors:  Risk factors include: Advance Directive Discussion  Cardiovascular risk  Colon Cancer Screening  Diabetic Lab Screening   Fall Risk  Glaucoma Risk  Immunizations Discussed/Encouraged (specific immunizations; Tdap, Influenza, Shingrix, COVID19, and RSV (Respiratory Syncytial Virus) )  Obesity/Overweight   Prostate Cancer Screening .    Compared to one year ago, the patient feels his physical health is the same.  Compared to one year ago, the patient feels his mental health is the same.    Patient Self-Management and Personalized Health Advice  The patient has been provided with information about: diet, exercise, weight management, prevention of cardiac or vascular disease, fall prevention, and designing advance directives and preventive services including:   Alcohol Misuse Screening and  Counseling  (15 minutes counseling time, Code )  Annual Wellness Visit (AWV)  Bone Density Measurements  Cardiovascular Disease Screening Tests (may do this order every 5 years in beneficiaries without signs or symptoms of cardiovascular disease)  Colorectal Cancer Screening, Colonoscopy  Colorectal Cancer Screening, Cologuard Test   Counseling to Prevent Tobacco Use (use of smartset and [email or web address] smartphrase for documentation)  Depression Screening (15 minutes face to face, Code ).      Assessment & Plan      Medications        Problem List         LOS      Diagnoses and all orders for this visit:    1. Initial Medicare annual wellness visit (Primary)    2. HTN (hypertension), benign    3. Mixed hyperlipidemia    4. Screening PSA (prostate specific antigen)    5. Type 2 diabetes mellitus with other circulatory complication, without long-term current use of insulin  -     POCT microalbumin  -     POCT urinalysis dipstick, manual    6. Class 3 severe obesity due to excess calories with serious comorbidity and body mass index (BMI) of 40.0 to 44.9 in adult    7. History of tobacco use    8. Dysuria  -     POCT urinalysis dipstick, manual        Medicare wellness.  Discussed health maintenance, routine immunizations, screening tests, lifestyle modification.

## 2024-03-05 ENCOUNTER — OFFICE VISIT (OUTPATIENT)
Dept: FAMILY MEDICINE CLINIC | Facility: CLINIC | Age: 69
End: 2024-03-05
Payer: MEDICARE

## 2024-03-05 ENCOUNTER — HOSPITAL ENCOUNTER (OUTPATIENT)
Dept: CT IMAGING | Facility: HOSPITAL | Age: 69
Discharge: HOME | End: 2024-03-05
Admitting: FAMILY MEDICINE
Payer: MEDICARE

## 2024-03-05 VITALS
HEIGHT: 65 IN | HEART RATE: 85 BPM | OXYGEN SATURATION: 96 % | SYSTOLIC BLOOD PRESSURE: 130 MMHG | BODY MASS INDEX: 42.15 KG/M2 | DIASTOLIC BLOOD PRESSURE: 78 MMHG | WEIGHT: 253 LBS | TEMPERATURE: 98.7 F | RESPIRATION RATE: 22 BRPM

## 2024-03-05 DIAGNOSIS — E11.59 TYPE 2 DIABETES MELLITUS WITH OTHER CIRCULATORY COMPLICATION, WITHOUT LONG-TERM CURRENT USE OF INSULIN: ICD-10-CM

## 2024-03-05 DIAGNOSIS — E78.2 MIXED HYPERLIPIDEMIA: ICD-10-CM

## 2024-03-05 DIAGNOSIS — R30.0 DYSURIA: ICD-10-CM

## 2024-03-05 DIAGNOSIS — Z00.00 INITIAL MEDICARE ANNUAL WELLNESS VISIT: Primary | ICD-10-CM

## 2024-03-05 DIAGNOSIS — Z23 NEED FOR IMMUNIZATION AGAINST INFLUENZA: ICD-10-CM

## 2024-03-05 DIAGNOSIS — I10 HTN (HYPERTENSION), BENIGN: ICD-10-CM

## 2024-03-05 DIAGNOSIS — Z87.891 HISTORY OF TOBACCO USE: ICD-10-CM

## 2024-03-05 DIAGNOSIS — Z12.5 SCREENING PSA (PROSTATE SPECIFIC ANTIGEN): ICD-10-CM

## 2024-03-05 DIAGNOSIS — R10.32 LEFT LOWER QUADRANT ABDOMINAL PAIN: ICD-10-CM

## 2024-03-05 DIAGNOSIS — E66.01 CLASS 3 SEVERE OBESITY DUE TO EXCESS CALORIES WITH SERIOUS COMORBIDITY AND BODY MASS INDEX (BMI) OF 40.0 TO 44.9 IN ADULT: ICD-10-CM

## 2024-03-05 LAB
BILIRUB BLD-MCNC: NEGATIVE MG/DL
CLARITY, POC: CLEAR
COLOR UR: YELLOW
EXPIRATION DATE: NORMAL
GLUCOSE UR STRIP-MCNC: ABNORMAL MG/DL
KETONES UR QL: NEGATIVE
LEUKOCYTE EST, POC: NEGATIVE
Lab: NORMAL
NITRITE UR-MCNC: NEGATIVE MG/ML
PH UR: 8 [PH] (ref 5–8)
POC CREATININE URINE: 0
POC MICROALBUMIN URINE: 50
PROT UR STRIP-MCNC: ABNORMAL MG/DL
RBC # UR STRIP: NEGATIVE /UL
SP GR UR: 1 (ref 1–1.03)
UROBILINOGEN UR QL: NORMAL

## 2024-03-05 PROCEDURE — 25510000001 IOPAMIDOL PER 1 ML: Performed by: FAMILY MEDICINE

## 2024-03-05 PROCEDURE — 74177 CT ABD & PELVIS W/CONTRAST: CPT

## 2024-03-05 RX ORDER — CIPROFLOXACIN 500 MG/1
500 TABLET, FILM COATED ORAL 2 TIMES DAILY
Qty: 30 TABLET | Refills: 0 | Status: SHIPPED | OUTPATIENT
Start: 2024-03-05 | End: 2024-03-19 | Stop reason: SDUPTHER

## 2024-03-05 RX ORDER — CEFTRIAXONE 1 G/1
1 INJECTION, POWDER, FOR SOLUTION INTRAMUSCULAR; INTRAVENOUS ONCE
Status: COMPLETED | OUTPATIENT
Start: 2024-03-05 | End: 2024-03-05

## 2024-03-05 RX ORDER — METRONIDAZOLE 500 MG/1
500 TABLET ORAL 3 TIMES DAILY
Qty: 45 TABLET | Refills: 0 | Status: SHIPPED | OUTPATIENT
Start: 2024-03-05 | End: 2024-03-19 | Stop reason: SDUPTHER

## 2024-03-05 RX ADMIN — IOPAMIDOL 100 ML: 755 INJECTION, SOLUTION INTRAVENOUS at 12:55

## 2024-03-05 RX ADMIN — CEFTRIAXONE 1 G: 1 INJECTION, POWDER, FOR SOLUTION INTRAMUSCULAR; INTRAVENOUS at 11:37

## 2024-03-06 LAB
ALBUMIN SERPL-MCNC: 4.7 G/DL (ref 3.9–4.9)
ALBUMIN/GLOB SERPL: 2 {RATIO} (ref 1.2–2.2)
ALP SERPL-CCNC: 59 IU/L (ref 44–121)
ALT SERPL-CCNC: 37 IU/L (ref 0–44)
AST SERPL-CCNC: 25 IU/L (ref 0–40)
BASOPHILS # BLD AUTO: 0.1 X10E3/UL (ref 0–0.2)
BASOPHILS NFR BLD AUTO: 1 %
BILIRUB SERPL-MCNC: 0.7 MG/DL (ref 0–1.2)
BUN SERPL-MCNC: 15 MG/DL (ref 8–27)
BUN/CREAT SERPL: 15 (ref 10–24)
CALCIUM SERPL-MCNC: 9.5 MG/DL (ref 8.6–10.2)
CHLORIDE SERPL-SCNC: 100 MMOL/L (ref 96–106)
CO2 SERPL-SCNC: 26 MMOL/L (ref 20–29)
CREAT SERPL-MCNC: 0.99 MG/DL (ref 0.76–1.27)
CRP SERPL-MCNC: 39 MG/L (ref 0–10)
EGFRCR SERPLBLD CKD-EPI 2021: 84 ML/MIN/1.73
EOSINOPHIL # BLD AUTO: 0.1 X10E3/UL (ref 0–0.4)
EOSINOPHIL NFR BLD AUTO: 2 %
ERYTHROCYTE [DISTWIDTH] IN BLOOD BY AUTOMATED COUNT: 13 % (ref 11.6–15.4)
GLOBULIN SER CALC-MCNC: 2.4 G/DL (ref 1.5–4.5)
GLUCOSE SERPL-MCNC: 106 MG/DL (ref 70–99)
HCT VFR BLD AUTO: 41.4 % (ref 37.5–51)
HGB BLD-MCNC: 14.2 G/DL (ref 13–17.7)
IMM GRANULOCYTES # BLD AUTO: 0 X10E3/UL (ref 0–0.1)
IMM GRANULOCYTES NFR BLD AUTO: 0 %
LIPASE SERPL-CCNC: 53 U/L (ref 13–78)
LYMPHOCYTES # BLD AUTO: 2.1 X10E3/UL (ref 0.7–3.1)
LYMPHOCYTES NFR BLD AUTO: 23 %
MCH RBC QN AUTO: 30.6 PG (ref 26.6–33)
MCHC RBC AUTO-ENTMCNC: 34.3 G/DL (ref 31.5–35.7)
MCV RBC AUTO: 89 FL (ref 79–97)
MONOCYTES # BLD AUTO: 1 X10E3/UL (ref 0.1–0.9)
MONOCYTES NFR BLD AUTO: 11 %
NEUTROPHILS # BLD AUTO: 5.6 X10E3/UL (ref 1.4–7)
NEUTROPHILS NFR BLD AUTO: 63 %
PLATELET # BLD AUTO: 206 X10E3/UL (ref 150–450)
POTASSIUM SERPL-SCNC: 4.7 MMOL/L (ref 3.5–5.2)
PROT SERPL-MCNC: 7.1 G/DL (ref 6–8.5)
RBC # BLD AUTO: 4.64 X10E6/UL (ref 4.14–5.8)
SODIUM SERPL-SCNC: 141 MMOL/L (ref 134–144)
WBC # BLD AUTO: 8.9 X10E3/UL (ref 3.4–10.8)

## 2024-03-07 LAB
BACTERIA UR CULT: NORMAL
BACTERIA UR CULT: NORMAL

## 2024-03-13 NOTE — PROGRESS NOTES
Subjective   John Wilburn is a 68 y.o. male.     Chief Complaint   Patient presents with    Abdominal Pain    Diverticulitis       Abdominal Pain  This is a new problem. The current episode started 1 to 4 weeks ago. The pain is located in the LLQ and RLQ. The pain is moderate. The quality of the pain is sharp. The abdominal pain does not radiate. Pertinent negatives include no belching, constipation, diarrhea, fever, flatus, hematuria, nausea or vomiting. Nothing aggravates the pain. He has tried antibiotics (Ciprofloxacin and Flagyl) for the symptoms. The treatment provided moderate relief. Prior diagnostic workup includes CT scan. His past medical history is significant for GERD.            I personally reviewed and updated the patient's allergies, medications, problem list, and past medical, surgical, social, and family history. I have reviewed and confirmed the accuracy of the History of Present Illness and Review of Symptoms as documented by the MA/DEJONN/RN. Kian Weston MD    Family History   Problem Relation Age of Onset    Hypertension Mother     Diabetes Mother             Heart disease Mother     Heart attack Father     Rheumatic fever Father     No Known Problems Sister     No Known Problems Brother     No Known Problems Maternal Aunt     No Known Problems Maternal Uncle     No Known Problems Paternal Aunt     No Known Problems Paternal Uncle     No Known Problems Maternal Grandmother     No Known Problems Maternal Grandfather     No Known Problems Paternal Grandmother     No Known Problems Paternal Grandfather     No Known Problems Other     Anemia Neg Hx     Arrhythmia Neg Hx     Asthma Neg Hx     Clotting disorder Neg Hx     Fainting Neg Hx     Heart failure Neg Hx     Hyperlipidemia Neg Hx        Social History     Tobacco Use    Smoking status: Former     Current packs/day: 0.00     Average packs/day: 0.5 packs/day for 15.0 years (7.5 ttl pk-yrs)     Types: Cigarettes     Start date:  1975     Quit date: 1990     Years since quittin.2     Passive exposure: Past    Smokeless tobacco: Never   Vaping Use    Vaping status: Never Used   Substance Use Topics    Alcohol use: Not Currently     Alcohol/week: 2.0 standard drinks of alcohol     Types: 2 Cans of beer per week    Drug use: No       Past Surgical History:   Procedure Laterality Date    CATARACT EXTRACTION, BILATERAL Bilateral     CORONARY STENT PLACEMENT      EYE SURGERY  10/16/2019    repair detached retina    NASAL SEPTUM SURGERY         Patient Active Problem List   Diagnosis    Acute myocardial infarction, subsequent episode of care    Acute right-sided low back pain with right-sided sciatica    Allergic rhinitis    Coronary angioplasty status    Edema    Welcome to Medicare preventive visit    Colon cancer screening    Screening PSA (prostate specific antigen)    GERD (gastroesophageal reflux disease)    Hepatitis B    History of tobacco use    HTN (hypertension), benign    Hyperlipidemia    Lumbar disc disease    Vertiginous syndrome and labyrinthine disorder    Nevus    Obstructive sleep apnea    Class 3 severe obesity due to excess calories with serious comorbidity and body mass index (BMI) of 40.0 to 44.9 in adult    Right hip pain    Vitamin D deficiency    Tendinitis of right rotator cuff    Chest pain in adult    Elevated fasting glucose    Coronary artery disease involving native coronary artery of native heart    Bradycardia    Screening for malignant neoplasm of colon    History of retinal detachment    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    Type 2 diabetes mellitus with circulatory disorder, without long-term current use of insulin         Current Outpatient Medications:     aspirin 81 MG EC tablet, Take 1 tablet by mouth Daily., Disp: 90 tablet, Rfl: 3    carvedilol (COREG) 25 MG tablet, Take 1 tablet by mouth 2 (Two) Times a Day., Disp: 180 tablet, Rfl: 3    cetirizine (zyrTEC) 10 MG tablet, Take 1 tablet  by mouth Daily., Disp: 30 tablet, Rfl: 1    Cholecalciferol (VITAMIN D) 50 MCG (2000 UT) tablet, Take 1 tablet by mouth 2 (two) times a day., Disp: , Rfl:     cyclobenzaprine (FLEXERIL) 10 MG tablet, Take 1 tablet by mouth At Night As Needed for Muscle Spasms., Disp: 90 tablet, Rfl: 2    fluticasone (FLONASE) 50 MCG/ACT nasal spray, 1-2 sprays in each nostril once daily for allergies, Disp: 18.2 g, Rfl: 2    losartan (COZAAR) 25 MG tablet, Take 1 tablet by mouth Daily., Disp: 90 tablet, Rfl: 3    metFORMIN (GLUCOPHAGE) 500 MG tablet, Take 1 tablet by mouth Daily With Breakfast., Disp: 90 tablet, Rfl: 3    nitroglycerin (NITROLINGUAL) 0.4 MG/SPRAY spray, Place 1 spray under the tongue Every 5 (Five) Minutes As Needed for Chest Pain., Disp: 1 each, Rfl: 0    rosuvastatin (CRESTOR) 20 MG tablet, Take 1 tablet by mouth Daily., Disp: 90 tablet, Rfl: 3    ciprofloxacin (CIPRO) 500 MG tablet, Take 1 tablet by mouth 2 (Two) Times a Day., Disp: 30 tablet, Rfl: 0    metroNIDAZOLE (FLAGYL) 500 MG tablet, Take 1 tablet by mouth 3 (Three) Times a Day for 15 days. Do not use alcohol for at least 24 hours after the last dose., Disp: 45 tablet, Rfl: 0         Review of Systems   Constitutional:  Negative for chills, diaphoresis and fever.   HENT:  Negative for trouble swallowing and voice change.    Eyes:  Negative for visual disturbance.   Respiratory:  Negative for shortness of breath.    Cardiovascular:  Negative for chest pain and palpitations.   Gastrointestinal:  Positive for abdominal pain. Negative for constipation, diarrhea, flatus, nausea and vomiting.   Endocrine: Negative for polydipsia and polyphagia.   Genitourinary:  Negative for hematuria.   Musculoskeletal:  Negative for neck stiffness.   Skin:  Negative for color change and pallor.   Allergic/Immunologic: Negative for immunocompromised state.   Neurological:  Negative for seizures and syncope.   Hematological:  Negative for adenopathy.   Psychiatric/Behavioral:   "Negative for hallucinations, sleep disturbance and suicidal ideas.        I have reviewed and confirmed the accuracy of the ROS as documented by the MA/LPN/RN Kian Weston MD      Objective   /72 (BP Location: Right arm, Patient Position: Sitting, Cuff Size: Large Adult)   Pulse 94   Temp 98 °F (36.7 °C) (Temporal)   Resp 18   Ht 165.1 cm (65\")   Wt 113 kg (250 lb 3.2 oz)   SpO2 97%   BMI 41.64 kg/m²   BP Readings from Last 3 Encounters:   03/15/24 122/72   03/05/24 130/78   01/24/24 139/65     Wt Readings from Last 3 Encounters:   03/15/24 113 kg (250 lb 3.2 oz)   03/05/24 115 kg (253 lb)   01/24/24 116 kg (255 lb)     Physical Exam  Constitutional:       Appearance: He is well-developed. He is not diaphoretic.   HENT:      Head: Normocephalic.      Right Ear: Hearing, tympanic membrane, ear canal and external ear normal.      Left Ear: Hearing, tympanic membrane, ear canal and external ear normal.      Nose: Nose normal. No mucosal edema or congestion.      Right Sinus: No maxillary sinus tenderness or frontal sinus tenderness.      Left Sinus: No maxillary sinus tenderness or frontal sinus tenderness.      Mouth/Throat:      Mouth: No oral lesions.      Pharynx: Uvula midline. No oropharyngeal exudate or posterior oropharyngeal erythema.      Tonsils: No tonsillar exudate.   Eyes:      General: Lids are normal.      Extraocular Movements:      Right eye: No nystagmus.      Left eye: No nystagmus.      Conjunctiva/sclera: Conjunctivae normal.      Right eye: Right conjunctiva is not injected. No hemorrhage.     Left eye: Left conjunctiva is not injected. No hemorrhage.     Pupils: Pupils are equal, round, and reactive to light.      Funduscopic exam:     Right eye: No hemorrhage, exudate, AV nicking, arteriolar narrowing or papilledema.         Left eye: No hemorrhage, exudate, AV nicking, arteriolar narrowing or papilledema.   Neck:      Thyroid: No thyromegaly.      Vascular: No carotid bruit or " JVD.      Trachea: No tracheal deviation.   Cardiovascular:      Rate and Rhythm: Normal rate and regular rhythm.      Heart sounds: Normal heart sounds. No murmur heard.     No friction rub. No gallop.   Pulmonary:      Effort: Pulmonary effort is normal.      Breath sounds: Normal breath sounds. No stridor. No decreased breath sounds, wheezing or rales.   Abdominal:      General: Bowel sounds are normal. There is no distension.      Palpations: Abdomen is soft. There is no mass.      Tenderness: There is no abdominal tenderness. There is no guarding or rebound.      Hernia: No hernia is present.   Lymphadenopathy:      Head:      Right side of head: No submental, submandibular, tonsillar, preauricular, posterior auricular or occipital adenopathy.      Left side of head: No submental, submandibular, tonsillar, preauricular, posterior auricular or occipital adenopathy.      Cervical: No cervical adenopathy.   Skin:     General: Skin is warm and dry.      Coloration: Skin is not pale.   Neurological:      Mental Status: He is alert and oriented to person, place, and time.      Cranial Nerves: No cranial nerve deficit.      Sensory: No sensory deficit.      Coordination: Coordination normal.      Gait: Gait normal.      Deep Tendon Reflexes: Reflexes are normal and symmetric.         Data / Lab Results:    Hemoglobin A1C   Date Value Ref Range Status   01/15/2024 6.9 (A) 4.5 - 5.7 % Final   08/15/2023 6.4 % Final   03/06/2023 6.7 % Final        Lab Results   Component Value Date    LDL 35 03/01/2024    LDL 64 02/23/2023    LDL 69 01/28/2022     Lab Results   Component Value Date    CHOL 114 08/08/2020    CHOL 132 04/05/2019    CHOL 130 10/19/2018     Lab Results   Component Value Date    TRIG 103 03/01/2024    TRIG 100 02/23/2023    TRIG 178 (H) 01/28/2022     Lab Results   Component Value Date    HDL 28 (L) 03/01/2024    HDL 32 (L) 02/23/2023    HDL 35 (L) 01/28/2022     Lab Results   Component Value Date    PSA 0.5  "03/01/2024    PSA 0.3 02/23/2023    PSA 0.3 01/28/2022     Lab Results   Component Value Date    WBC 8.9 03/05/2024    HGB 14.2 03/05/2024    HCT 41.4 03/05/2024    MCV 89 03/05/2024     03/05/2024     Lab Results   Component Value Date    TSH 1.880 03/01/2024      Lab Results   Component Value Date    GLUCOSE 106 (H) 03/05/2024    BUN 15 03/05/2024    CREATININE 0.99 03/05/2024    EGFRIFNONA 68 01/28/2022    EGFRIFAFRI 79 01/28/2022    BCR 15 03/05/2024    K 4.7 03/05/2024    CO2 26 03/05/2024    CALCIUM 9.5 03/05/2024    PROTENTOTREF 7.1 03/05/2024    ALBUMIN 4.7 03/05/2024    LABIL2 2.0 03/05/2024    AST 25 03/05/2024    ALT 37 03/05/2024     No results found for: \"MAT\", \"RF\", \"SEDRATE\"   No results found for: \"CRP\"   No results found for: \"IRON\", \"TIBC\", \"FERRITIN\"   Lab Results   Component Value Date    [id number] 481 04/03/2018          Assessment & Plan      Medications        Problem List         LOS    Health maintenance.   doing well, vaccines current.  Recommend update tetanus vaccine at the pharmacy.  Baby aspirin daily.  Discussed health maintenance, screening test, lifestyle modification.  AAA ultrasound scheduled.  Coronary artery disease.  Stable, followed by cardiology Dr. escobedo.  History of repeat stenting 2015.  Cardiac stress testing benign 2021.  Continue risk factor reduction.  Remote history of carotid Dopplers, recommend repeat, he declined secondary to cost but states he will consider in future.  Hypertension.  Good control.  Tolerating carvedilol.  Discussed low-sodium diet.  Type 2 diabetes.  New onset, A1c to 6.9.  Tolerating metformin.  Discussed diet, exercise and lifestyle modification, follow-up recheck A1c.  He is working on diet, has lost weight.  Consider add Ozempic.  Hyperlipidemia.  Improved on Crestor.  Plans to start weight watchers, follow-up recheck fasting labs.  Retinal detachment.  December/2019.  Improved status post surgery.  Followed by ophthalmology.  Lumbar " disc disease.  Flare currently/lumbar muscle sprain, no radiculopathy.  Continue muscle relaxants, rehabilitation exercises discussed, consider PT.  Start prednisone.  Follow-up recheck.  Consider imaging if persistent symptoms.  Followed by pain management/neurosurgery, has had repeat imagery..  s/p course epidural, consider radiofrequency ablation.  Prostate screening.  Follow-up check PSA.  GERD.  Stable on PPI.  EGD benign/dilation only 2019.  Colon cancer screening.  Colonoscopy benign 2019.  Repeat eval 10 years.  IRENE.  Stable on CPAP.  Rotator cuff tendinitis.    Right.  Much improved today status post injection.  Rehabilitation exercises discussed.  Consider imaging, Ortho referral if persistent symptoms.  Elevated fasting blood sugar.  Discussed diet, exercise lifestyle modification.  Improved today, A1c to 6.4.  Discussed diet, exercise Cel[place] modification.  Follow-up 3 to 4 months.  Plan start Rx if persistent elevation.  Vocal tic.  Mild, infrequent symptoms currently.  Consider guanfacine.  Tremor.  Intermittent, benign exam today.  TSH normal.  Possibly secondary to benign physiologic tremor.  Follow-up recheck.  Consider further eval if worsening symptoms.  OA left shoulder.  Ice, Tylenol, rehabilitation exercise discussed.  Continue as needed nightly muscle relaxant.  Consider imaging if persistent symptoms.  Joint injection declined.  Carpal tunnel.  Improved with nightly bracing.  COVID-19 vaccine status: Vaccinated.  Recommend booster.   Lateral epicondylitis.  Improved today status post injection. Ice, rehabilitation exercise discussed.  Counterforce bracing.  Call return if persistent symptoms.  COVID-19.  With cough, fever, no respiratory difficulty.  Has been vaccinated.  Significant bacterial sinusitis on exam, start antibiotics/prednisone/antivirals.  Continue coated baby aspirin daily.  Quarantine.  Signs symptom progression severe disease discussed, call or return if worsening  symptoms.  Diverticulitis.  Clinically improved today, advancing activity, tolerating antibiotics, confirmed per CT, CBC normal.  Continue antibiotics, follow-up recheck.  Consider repeat course antibiotics if persistent symptoms.  Follow-up colonoscopy planned.  Possible necessity admission for IV antibiotics discussed.  Call return if fever, unable keep fluids down, worsening symptoms.      Diagnoses and all orders for this visit:    1. Diverticulitis (Primary)    2. Left lower quadrant abdominal pain    3. Class 3 severe obesity due to excess calories with serious comorbidity and body mass index (BMI) of 40.0 to 44.9 in adult    4. History of tobacco use    5. Type 2 diabetes mellitus with other circulatory complication, without long-term current use of insulin    6. HTN (hypertension), benign      Class 3 Severe Obesity (BMI >=40). Obesity-related health conditions include the following: hypertension, diabetes mellitus, and dyslipidemias. Obesity is unchanged. BMI is is above average; BMI management plan is completed. We discussed portion control and increasing exercise.          Expected course, medications, and adverse effects discussed.  Call or return if worsening or persistent symptoms.  I wore protective equipment throughout this patient encounter including a mask, gloves, and eye protection.  Hand hygiene was performed before donning protective equipment and after removal when leaving the room. The complete contents of the Assessment and Plan and Data/Lab Results as documented above have been reviewed and addressed by myself with the patient today as part of an ongoing evaluation / treatment plan.  If some of the documentation has been copied from a previous note and is unchanged it indicates that this problem / plan has been assessed today but is stable from a previous visit and no changes have been recommended.

## 2024-03-15 ENCOUNTER — OFFICE VISIT (OUTPATIENT)
Dept: FAMILY MEDICINE CLINIC | Facility: CLINIC | Age: 69
End: 2024-03-15
Payer: MEDICARE

## 2024-03-15 VITALS
DIASTOLIC BLOOD PRESSURE: 72 MMHG | TEMPERATURE: 98 F | SYSTOLIC BLOOD PRESSURE: 122 MMHG | HEART RATE: 94 BPM | WEIGHT: 250.2 LBS | BODY MASS INDEX: 41.69 KG/M2 | RESPIRATION RATE: 18 BRPM | OXYGEN SATURATION: 97 % | HEIGHT: 65 IN

## 2024-03-15 DIAGNOSIS — E66.01 CLASS 3 SEVERE OBESITY DUE TO EXCESS CALORIES WITH SERIOUS COMORBIDITY AND BODY MASS INDEX (BMI) OF 40.0 TO 44.9 IN ADULT: ICD-10-CM

## 2024-03-15 DIAGNOSIS — R10.32 LEFT LOWER QUADRANT ABDOMINAL PAIN: ICD-10-CM

## 2024-03-15 DIAGNOSIS — K57.92 DIVERTICULITIS: Primary | ICD-10-CM

## 2024-03-15 DIAGNOSIS — Z87.891 HISTORY OF TOBACCO USE: ICD-10-CM

## 2024-03-15 DIAGNOSIS — E11.59 TYPE 2 DIABETES MELLITUS WITH OTHER CIRCULATORY COMPLICATION, WITHOUT LONG-TERM CURRENT USE OF INSULIN: ICD-10-CM

## 2024-03-15 DIAGNOSIS — I10 HTN (HYPERTENSION), BENIGN: Chronic | ICD-10-CM

## 2024-03-19 ENCOUNTER — PATIENT MESSAGE (OUTPATIENT)
Dept: FAMILY MEDICINE CLINIC | Facility: CLINIC | Age: 69
End: 2024-03-19
Payer: MEDICARE

## 2024-03-19 ENCOUNTER — TELEPHONE (OUTPATIENT)
Dept: FAMILY MEDICINE CLINIC | Facility: CLINIC | Age: 69
End: 2024-03-19
Payer: MEDICARE

## 2024-03-19 DIAGNOSIS — R10.32 LEFT LOWER QUADRANT ABDOMINAL PAIN: ICD-10-CM

## 2024-03-19 DIAGNOSIS — R30.0 DYSURIA: ICD-10-CM

## 2024-03-19 RX ORDER — METRONIDAZOLE 500 MG/1
500 TABLET ORAL 3 TIMES DAILY
Qty: 45 TABLET | Refills: 0 | Status: SHIPPED | OUTPATIENT
Start: 2024-03-19 | End: 2024-04-03

## 2024-03-19 RX ORDER — CIPROFLOXACIN 500 MG/1
500 TABLET, FILM COATED ORAL 2 TIMES DAILY
Qty: 30 TABLET | Refills: 0 | Status: SHIPPED | OUTPATIENT
Start: 2024-03-19

## 2024-03-19 NOTE — TELEPHONE ENCOUNTER
Okay to send refill on ciprofloxacin 500 mg twice daily for 15 days and on metronidazole 500 mg 3 times daily for 15 days, he should let me know if he does not improve, thanks  
Patient is asking for Refill on Antibiotic for Diverticulitis and Abdominal Pain. He is in [place] right now and has provided pharmacy information  
Sent in medication and notified patient  
English

## 2024-04-29 NOTE — PROGRESS NOTES
Subjective   John Wilburn is a 68 y.o. male.     Chief Complaint   Patient presents with    Diabetes    Hyperlipidemia    Hypertension    Diverticulitis       History of Present Illness  Diabetic Foot Exam Performed   Diabetes  He presents for his follow-up diabetic visit. He has type 2 diabetes mellitus. Pertinent negatives for hypoglycemia include no headaches, pallor, seizures or sweats. Pertinent negatives for diabetes include no blurred vision, no chest pain, no polydipsia and no polyphagia. Current diabetic treatment includes oral agent (monotherapy). Meal planning includes avoidance of concentrated sweets. He participates in exercise intermittently. (John checks his blood sugar occasionally if he is having fatigue or concerns of possible blood sugar issues. Reports recently runs around 110s) An ACE inhibitor/angiotensin II receptor blocker is not being taken.   Hyperlipidemia  This is a chronic problem. The current episode started more than 1 year ago. Recent lipid tests were reviewed and are low. Pertinent negatives include no chest pain, focal weakness, leg pain or shortness of breath. Current antihyperlipidemic treatment includes statins. The current treatment provides mild improvement of lipids. There are no compliance problems.  Risk factors for coronary artery disease include dyslipidemia, hypertension, male sex and obesity.   Hypertension  This is a chronic problem. The current episode started more than 1 year ago. The problem is unchanged. Pertinent negatives include no anxiety, blurred vision, chest pain, headaches, malaise/fatigue, palpitations, shortness of breath or sweats. There are no associated agents to hypertension. Risk factors for coronary artery disease include male gender, dyslipidemia and obesity. Past treatments include lifestyle changes. Current antihypertension treatment includes beta blockers and ACE inhibitors. The current treatment provides moderate improvement. There are no  compliance problems.    Diverticulitis  This is a recurrent problem. The current episode started more than 1 month ago. The problem occurs intermittently. The problem has been waxing and waning. Associated symptoms include abdominal pain. Pertinent negatives include no change in bowel habit, chest pain, chills, diaphoresis, fever, headaches, nausea or vomiting.            I personally reviewed and updated the patient's allergies, medications, problem list, and past medical, surgical, social, and family history. I have reviewed and confirmed the accuracy of the History of Present Illness and Review of Symptoms as documented by the MA/LPN/RN. Kian Weston MD    Family History   Problem Relation Age of Onset    Hypertension Mother     Diabetes Mother             Heart disease Mother     Heart attack Father     Rheumatic fever Father     No Known Problems Sister     No Known Problems Brother     No Known Problems Maternal Aunt     No Known Problems Maternal Uncle     No Known Problems Paternal Aunt     No Known Problems Paternal Uncle     No Known Problems Maternal Grandmother     No Known Problems Maternal Grandfather     No Known Problems Paternal Grandmother     No Known Problems Paternal Grandfather     No Known Problems Other     Anemia Neg Hx     Arrhythmia Neg Hx     Asthma Neg Hx     Clotting disorder Neg Hx     Fainting Neg Hx     Heart failure Neg Hx     Hyperlipidemia Neg Hx        Social History     Tobacco Use    Smoking status: Former     Current packs/day: 0.00     Average packs/day: 0.5 packs/day for 15.0 years (7.5 ttl pk-yrs)     Types: Cigarettes     Start date: 1975     Quit date: 1990     Years since quittin.3     Passive exposure: Past    Smokeless tobacco: Never   Vaping Use    Vaping status: Never Used   Substance Use Topics    Alcohol use: Not Currently     Alcohol/week: 2.0 standard drinks of alcohol     Types: 2 Cans of beer per week    Drug use: No       Past Surgical  History:   Procedure Laterality Date    CATARACT EXTRACTION, BILATERAL Bilateral     CORONARY STENT PLACEMENT  2003    EYE SURGERY  10/16/2019    repair detached retina    NASAL SEPTUM SURGERY         Patient Active Problem List   Diagnosis    Acute myocardial infarction, subsequent episode of care    Acute right-sided low back pain with right-sided sciatica    Allergic rhinitis    Coronary angioplasty status    Edema    Welcome to Medicare preventive visit    Colon cancer screening    Screening PSA (prostate specific antigen)    GERD (gastroesophageal reflux disease)    Hepatitis B    History of tobacco use    HTN (hypertension), benign    Hyperlipidemia    Lumbar disc disease    Vertiginous syndrome and labyrinthine disorder    Nevus    Obstructive sleep apnea    Class 3 severe obesity due to excess calories with serious comorbidity and body mass index (BMI) of 40.0 to 44.9 in adult    Right hip pain    Vitamin D deficiency    Tendinitis of right rotator cuff    Chest pain in adult    Elevated fasting glucose    Coronary artery disease involving native coronary artery of native heart    Bradycardia    Screening for malignant neoplasm of colon    History of retinal detachment    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    Type 2 diabetes mellitus with circulatory disorder, without long-term current use of insulin         Current Outpatient Medications:     aspirin 81 MG EC tablet, Take 1 tablet by mouth Daily., Disp: 90 tablet, Rfl: 3    carvedilol (COREG) 25 MG tablet, Take 1 tablet by mouth 2 (Two) Times a Day., Disp: 180 tablet, Rfl: 3    cetirizine (zyrTEC) 10 MG tablet, Take 1 tablet by mouth Daily., Disp: 30 tablet, Rfl: 1    Cholecalciferol (VITAMIN D) 50 MCG (2000 UT) tablet, Take 1 tablet by mouth 2 (two) times a day., Disp: , Rfl:     ciprofloxacin (CIPRO) 500 MG tablet, Take 1 tablet by mouth 2 (Two) Times a Day., Disp: 20 tablet, Rfl: 0    cyclobenzaprine (FLEXERIL) 10 MG tablet, Take 1 tablet by mouth At  "Night As Needed for Muscle Spasms., Disp: 90 tablet, Rfl: 2    fluticasone (FLONASE) 50 MCG/ACT nasal spray, 1-2 sprays in each nostril once daily for allergies, Disp: 18.2 g, Rfl: 2    losartan (COZAAR) 25 MG tablet, Take 1 tablet by mouth Daily., Disp: 90 tablet, Rfl: 3    metFORMIN (GLUCOPHAGE) 500 MG tablet, Take 1 tablet by mouth Daily With Breakfast., Disp: 90 tablet, Rfl: 3    metroNIDAZOLE (FLAGYL) 500 MG tablet, Take 1 tablet by mouth 3 (Three) Times a Day. Do not use alcohol for at least 24 hours after the last dose., Disp: 30 tablet, Rfl: 0    nitroglycerin (NITROLINGUAL) 0.4 MG/SPRAY spray, Place 1 spray under the tongue Every 5 (Five) Minutes As Needed for Chest Pain., Disp: 1 each, Rfl: 0    rosuvastatin (CRESTOR) 20 MG tablet, Take 1 tablet by mouth Daily., Disp: 90 tablet, Rfl: 3          Review of Systems   Constitutional:  Negative for chills, diaphoresis, fever and malaise/fatigue.   Eyes:  Negative for blurred vision and visual disturbance.   Respiratory:  Negative for shortness of breath.    Cardiovascular:  Negative for chest pain and palpitations.   Gastrointestinal:  Positive for abdominal pain. Negative for change in bowel habit, nausea and vomiting.   Endocrine: Negative for polydipsia and polyphagia.   Musculoskeletal:  Negative for neck stiffness.   Skin:  Negative for color change and pallor.   Neurological:  Negative for focal weakness, seizures and syncope.   Hematological:  Negative for adenopathy.   Psychiatric/Behavioral:  Negative for hallucinations and suicidal ideas.        I have reviewed and confirmed the accuracy of the ROS as documented by the MA/LPN/RN Kian Weston MD      Objective   /72 (BP Location: Right arm, Patient Position: Sitting, Cuff Size: Large Adult)   Pulse 83   Temp 97.7 °F (36.5 °C)   Resp 18   Ht 165.1 cm (65\")   Wt 111 kg (245 lb)   SpO2 96%   BMI 40.77 kg/m²   BP Readings from Last 3 Encounters:   04/30/24 124/72   03/15/24 122/72   03/05/24 " "130/78     Wt Readings from Last 3 Encounters:   04/30/24 111 kg (245 lb)   03/15/24 113 kg (250 lb 3.2 oz)   03/05/24 115 kg (253 lb)           Data / Lab Results:    Hemoglobin A1C   Date Value Ref Range Status   04/30/2024 5.8 (A) 4.5 - 5.7 % Final   01/15/2024 6.9 (A) 4.5 - 5.7 % Final   08/15/2023 6.4 % Final        Lab Results   Component Value Date    LDL 35 03/01/2024    LDL 64 02/23/2023    LDL 69 01/28/2022     Lab Results   Component Value Date    CHOL 114 08/08/2020    CHOL 132 04/05/2019    CHOL 130 10/19/2018     Lab Results   Component Value Date    TRIG 103 03/01/2024    TRIG 100 02/23/2023    TRIG 178 (H) 01/28/2022     Lab Results   Component Value Date    HDL 28 (L) 03/01/2024    HDL 32 (L) 02/23/2023    HDL 35 (L) 01/28/2022     Lab Results   Component Value Date    PSA 0.5 03/01/2024    PSA 0.3 02/23/2023    PSA 0.3 01/28/2022     Lab Results   Component Value Date    WBC 8.9 03/05/2024    HGB 14.2 03/05/2024    HCT 41.4 03/05/2024    MCV 89 03/05/2024     03/05/2024     Lab Results   Component Value Date    TSH 1.880 03/01/2024      Lab Results   Component Value Date    GLUCOSE 106 (H) 03/05/2024    BUN 15 03/05/2024    CREATININE 0.99 03/05/2024    EGFRIFNONA 68 01/28/2022    EGFRIFAFRI 79 01/28/2022    BCR 15 03/05/2024    K 4.7 03/05/2024    CO2 26 03/05/2024    CALCIUM 9.5 03/05/2024    PROTENTOTREF 7.1 03/05/2024    ALBUMIN 4.7 03/05/2024    LABIL2 2.0 03/05/2024    AST 25 03/05/2024    ALT 37 03/05/2024     No results found for: \"MAT\", \"RF\", \"SEDRATE\"   No results found for: \"CRP\"   No results found for: \"IRON\", \"TIBC\", \"FERRITIN\"   Lab Results   Component Value Date    [id number] 481 04/03/2018            Physical Exam  Constitutional:       Appearance: Normal appearance. He is well-developed. He is not diaphoretic.   Cardiovascular:      Rate and Rhythm: Normal rate and regular rhythm.      Pulses: Normal pulses.      Heart sounds: Normal heart sounds, S1 normal and S2 normal. No " murmur heard.     No friction rub. No gallop.   Pulmonary:      Effort: Pulmonary effort is normal. No accessory muscle usage.      Breath sounds: Normal breath sounds. No stridor. No decreased breath sounds, wheezing, rhonchi or rales.   Abdominal:      General: Bowel sounds are normal. There is no distension.      Palpations: Abdomen is soft. Abdomen is not rigid. There is no mass or pulsatile mass.      Tenderness: There is no abdominal tenderness. There is no guarding or rebound. Negative signs include Khanna's sign.      Hernia: No hernia is present.   Feet:      Right foot:      Protective Sensation: 10 sites tested.  10 sites sensed.      Skin integrity: Skin integrity normal.      Toenail Condition: Right toenails are normal.      Left foot:      Protective Sensation: 10 sites tested.  10 sites sensed.      Skin integrity: Skin integrity normal.      Toenail Condition: Left toenails are normal.   Skin:     General: Skin is warm and dry.      Coloration: Skin is not pale.   Neurological:      Mental Status: He is alert and oriented to person, place, and time.      Coordination: Coordination normal.      Gait: Gait normal.         Assessment & Plan      Medications        Problem List         LOS  Health maintenance.   doing well, vaccines current.  Recommend update tetanus vaccine at the pharmacy.  Baby aspirin daily.  Discussed health maintenance, screening test, lifestyle modification.  AAA ultrasound scheduled.  Coronary artery disease.  Stable, followed by cardiology Dr. escobedo.  History of repeat stenting 2015.  Cardiac stress testing benign 2021.  Continue risk factor reduction.  Remote history of carotid Dopplers, recommend repeat, he declined secondary to cost but states he will consider in future.  Hypertension.  Good control.  Tolerating carvedilol.  Discussed low-sodium diet.  Type 2 diabetes.  New onset, improved today, has been working on diet, A1c to 5.8.  Tolerating metformin.  Discussed  diet, exercise and lifestyle modification, follow-up recheck A1c.  He is working on diet, has lost weight.  Consider add Ozempic.  Hyperlipidemia.  Improved on Crestor.  Plans to start weight watchers, follow-up recheck fasting labs.  Retinal detachment.  December/2019.  Improved status post surgery.  Followed by ophthalmology.  Lumbar disc disease.  Flare currently/lumbar muscle sprain, no radiculopathy.  Continue muscle relaxants, rehabilitation exercises discussed, consider PT.  Start prednisone.  Follow-up recheck.  Consider imaging if persistent symptoms.  Followed by pain management/neurosurgery, has had repeat imagery..  s/p course epidural, consider radiofrequency ablation.  Prostate screening.  Follow-up check PSA.  GERD.  Stable on PPI.  EGD benign/dilation only 2019.  Colon cancer screening.  Colonoscopy benign 2019.  Repeat eval 10 years.  IRENE.  Stable on CPAP.  Rotator cuff tendinitis.    Right.  Much improved today status post injection.  Rehabilitation exercises discussed.  Consider imaging, Ortho referral if persistent symptoms.  Elevated fasting blood sugar.  Discussed diet, exercise lifestyle modification.  Improved today, A1c to 6.4.  Discussed diet, exercise Cel[place] modification.  Follow-up 3 to 4 months.  Plan start Rx if persistent elevation.  Vocal tic.  Mild, infrequent symptoms currently.  Consider guanfacine.  Tremor.  Intermittent, benign exam today.  TSH normal.  Possibly secondary to benign physiologic tremor.  Follow-up recheck.  Consider further eval if worsening symptoms.  OA left shoulder.  Ice, Tylenol, rehabilitation exercise discussed.  Continue as needed nightly muscle relaxant.  Consider imaging if persistent symptoms.  Joint injection declined.  Carpal tunnel.  Improved with nightly bracing.  COVID-19 vaccine status: Vaccinated.  Recommend booster.   Lateral epicondylitis.  Improved today status post injection. Ice, rehabilitation exercise discussed.  Counterforce bracing.   Call return if persistent symptoms.  COVID-19.  With cough, fever, no respiratory difficulty.  Has been vaccinated.  Significant bacterial sinusitis on exam, start antibiotics/prednisone/antivirals.  Continue coated baby aspirin daily.  Quarantine.  Signs symptom progression severe disease discussed, call or return if worsening symptoms.  Diverticulitis.  Clinically improved today, advancing activity, tolerating antibiotics, completing course confirmed per CT, CBC normal.  Consider repeat course antibiotics if persistent symptoms.  Recommend follow-up colonoscopy [facility] referral scheduled.  Possible necessity admission for IV antibiotics discussed.  Call return if fever, unable keep fluids down, worsening symptoms.        Diagnoses and all orders for this visit:    1. Type 2 diabetes mellitus with other circulatory complication, without long-term current use of insulin (Primary)  -     POC Glycosylated Hemoglobin (Hb A1C)    2. HTN (hypertension), benign    3. Mixed hyperlipidemia    4. Class 3 severe obesity due to excess calories with serious comorbidity and body mass index (BMI) of 40.0 to 44.9 in adult    5. History of tobacco use    6. Dysuria  -     metroNIDAZOLE (FLAGYL) 500 MG tablet; Take 1 tablet by mouth 3 (Three) Times a Day. Do not use alcohol for at least 24 hours after the last dose.  Dispense: 30 tablet; Refill: 0  -     ciprofloxacin (CIPRO) 500 MG tablet; Take 1 tablet by mouth 2 (Two) Times a Day.  Dispense: 20 tablet; Refill: 0    7. Left lower quadrant abdominal pain  -     metroNIDAZOLE (FLAGYL) 500 MG tablet; Take 1 tablet by mouth 3 (Three) Times a Day. Do not use alcohol for at least 24 hours after the last dose.  Dispense: 30 tablet; Refill: 0  -     ciprofloxacin (CIPRO) 500 MG tablet; Take 1 tablet by mouth 2 (Two) Times a Day.  Dispense: 20 tablet; Refill: 0    8. Diverticulitis  -     metroNIDAZOLE (FLAGYL) 500 MG tablet; Take 1 tablet by mouth 3 (Three) Times a Day. Do not use alcohol for  at least 24 hours after the last dose.  Dispense: 30 tablet; Refill: 0  -     ciprofloxacin (CIPRO) 500 MG tablet; Take 1 tablet by mouth 2 (Two) Times a Day.  Dispense: 20 tablet; Refill: 0        Class 3 Severe Obesity (BMI >=40). Obesity-related health conditions include the following: hypertension, diabetes mellitus, and dyslipidemias. Obesity is unchanged. BMI is is above average; BMI management plan is completed. We discussed portion control and increasing exercise.      Expected course, medications, and adverse effects discussed.  Call or return if worsening or persistent symptoms.  I wore protective equipment throughout this patient encounter including a mask, gloves, and eye protection.  Hand hygiene was performed before donning protective equipment and after removal when leaving the room.  The complete contents of the Assessment and Plan and Data/Labs Results as documented above have been reviewed and addressed by myself with the patient today as part of an ongoing evaluation / treatment plan.  If some of the documentation has been copied from a previous note and is unchanged it indicates that this problem / plan has been assessed today but is stable from a previous visit and no changes have been recommended.

## 2024-04-30 ENCOUNTER — OFFICE VISIT (OUTPATIENT)
Dept: FAMILY MEDICINE CLINIC | Facility: CLINIC | Age: 69
End: 2024-04-30
Payer: MEDICARE

## 2024-04-30 VITALS
TEMPERATURE: 97.7 F | HEART RATE: 83 BPM | BODY MASS INDEX: 40.82 KG/M2 | DIASTOLIC BLOOD PRESSURE: 72 MMHG | RESPIRATION RATE: 18 BRPM | SYSTOLIC BLOOD PRESSURE: 124 MMHG | HEIGHT: 65 IN | WEIGHT: 245 LBS | OXYGEN SATURATION: 96 %

## 2024-04-30 DIAGNOSIS — E66.01 CLASS 3 SEVERE OBESITY DUE TO EXCESS CALORIES WITH SERIOUS COMORBIDITY AND BODY MASS INDEX (BMI) OF 40.0 TO 44.9 IN ADULT: ICD-10-CM

## 2024-04-30 DIAGNOSIS — I10 HTN (HYPERTENSION), BENIGN: ICD-10-CM

## 2024-04-30 DIAGNOSIS — R30.0 DYSURIA: ICD-10-CM

## 2024-04-30 DIAGNOSIS — E78.2 MIXED HYPERLIPIDEMIA: ICD-10-CM

## 2024-04-30 DIAGNOSIS — R10.32 LEFT LOWER QUADRANT ABDOMINAL PAIN: ICD-10-CM

## 2024-04-30 DIAGNOSIS — E11.59 TYPE 2 DIABETES MELLITUS WITH OTHER CIRCULATORY COMPLICATION, WITHOUT LONG-TERM CURRENT USE OF INSULIN: Primary | ICD-10-CM

## 2024-04-30 DIAGNOSIS — K57.92 DIVERTICULITIS: ICD-10-CM

## 2024-04-30 DIAGNOSIS — Z87.891 HISTORY OF TOBACCO USE: ICD-10-CM

## 2024-04-30 LAB
EXPIRATION DATE: ABNORMAL
HBA1C MFR BLD: 5.8 % (ref 4.5–5.7)
Lab: ABNORMAL

## 2024-04-30 PROCEDURE — 83036 HEMOGLOBIN GLYCOSYLATED A1C: CPT | Performed by: FAMILY MEDICINE

## 2024-04-30 PROCEDURE — 3078F DIAST BP <80 MM HG: CPT | Performed by: FAMILY MEDICINE

## 2024-04-30 PROCEDURE — 3074F SYST BP LT 130 MM HG: CPT | Performed by: FAMILY MEDICINE

## 2024-04-30 PROCEDURE — G2211 COMPLEX E/M VISIT ADD ON: HCPCS | Performed by: FAMILY MEDICINE

## 2024-04-30 PROCEDURE — 3044F HG A1C LEVEL LT 7.0%: CPT | Performed by: FAMILY MEDICINE

## 2024-04-30 PROCEDURE — 99214 OFFICE O/P EST MOD 30 MIN: CPT | Performed by: FAMILY MEDICINE

## 2024-04-30 RX ORDER — CIPROFLOXACIN 500 MG/1
500 TABLET, FILM COATED ORAL 2 TIMES DAILY
Qty: 20 TABLET | Refills: 0 | Status: SHIPPED | OUTPATIENT
Start: 2024-04-30

## 2024-04-30 RX ORDER — METRONIDAZOLE 500 MG/1
500 TABLET ORAL 3 TIMES DAILY
Qty: 30 TABLET | Refills: 0 | Status: SHIPPED | OUTPATIENT
Start: 2024-04-30

## 2024-08-02 DIAGNOSIS — E78.5 DYSLIPIDEMIA: ICD-10-CM

## 2024-08-02 RX ORDER — LOSARTAN POTASSIUM 25 MG/1
25 TABLET ORAL DAILY
Qty: 90 TABLET | Refills: 3 | Status: SHIPPED | OUTPATIENT
Start: 2024-08-02

## 2024-08-07 ENCOUNTER — OFFICE VISIT (OUTPATIENT)
Dept: FAMILY MEDICINE CLINIC | Facility: CLINIC | Age: 69
End: 2024-08-07
Payer: MEDICARE

## 2024-08-07 VITALS
SYSTOLIC BLOOD PRESSURE: 129 MMHG | RESPIRATION RATE: 16 BRPM | HEART RATE: 60 BPM | WEIGHT: 243 LBS | OXYGEN SATURATION: 96 % | HEIGHT: 65 IN | DIASTOLIC BLOOD PRESSURE: 83 MMHG | BODY MASS INDEX: 40.48 KG/M2 | TEMPERATURE: 97.4 F

## 2024-08-07 DIAGNOSIS — R05.1 ACUTE COUGH: ICD-10-CM

## 2024-08-07 DIAGNOSIS — J02.9 SORE THROAT: ICD-10-CM

## 2024-08-07 DIAGNOSIS — E66.01 CLASS 3 SEVERE OBESITY DUE TO EXCESS CALORIES WITH SERIOUS COMORBIDITY AND BODY MASS INDEX (BMI) OF 40.0 TO 44.9 IN ADULT: ICD-10-CM

## 2024-08-07 DIAGNOSIS — J01.40 ACUTE NON-RECURRENT PANSINUSITIS: Primary | ICD-10-CM

## 2024-08-07 LAB
EXPIRATION DATE: NORMAL
EXPIRATION DATE: NORMAL
FLUAV AG UPPER RESP QL IA.RAPID: NOT DETECTED
FLUBV AG UPPER RESP QL IA.RAPID: NOT DETECTED
INTERNAL CONTROL: NORMAL
INTERNAL CONTROL: NORMAL
Lab: NORMAL
Lab: NORMAL
S PYO AG THROAT QL: NEGATIVE
SARS-COV-2 AG UPPER RESP QL IA.RAPID: NOT DETECTED

## 2024-08-07 PROCEDURE — 3079F DIAST BP 80-89 MM HG: CPT | Performed by: NURSE PRACTITIONER

## 2024-08-07 PROCEDURE — 3074F SYST BP LT 130 MM HG: CPT | Performed by: NURSE PRACTITIONER

## 2024-08-07 PROCEDURE — 87428 SARSCOV & INF VIR A&B AG IA: CPT | Performed by: NURSE PRACTITIONER

## 2024-08-07 PROCEDURE — 3044F HG A1C LEVEL LT 7.0%: CPT | Performed by: NURSE PRACTITIONER

## 2024-08-07 PROCEDURE — 1160F RVW MEDS BY RX/DR IN RCRD: CPT | Performed by: NURSE PRACTITIONER

## 2024-08-07 PROCEDURE — 87880 STREP A ASSAY W/OPTIC: CPT | Performed by: NURSE PRACTITIONER

## 2024-08-07 PROCEDURE — 1159F MED LIST DOCD IN RCRD: CPT | Performed by: NURSE PRACTITIONER

## 2024-08-07 PROCEDURE — 99213 OFFICE O/P EST LOW 20 MIN: CPT | Performed by: NURSE PRACTITIONER

## 2024-08-07 PROCEDURE — 1126F AMNT PAIN NOTED NONE PRSNT: CPT | Performed by: NURSE PRACTITIONER

## 2024-08-07 RX ORDER — AMOXICILLIN 500 MG/1
500 TABLET, FILM COATED ORAL 3 TIMES DAILY
Qty: 30 TABLET | Refills: 0 | Status: SHIPPED | OUTPATIENT
Start: 2024-08-07 | End: 2024-08-17

## 2024-08-07 NOTE — PROGRESS NOTES
Chief Complaint  Sore Throat and Earache    Subjective          John Wilburn presents to [facility] for sore throat earache and now sinus pressure    History of Present Illness    Patient is here complaining of a sore throat and bilateral earache.  He also has sinus pressure frontally that started this morning.  He has no fever.  He has however had some chills at night.  His daughter recently had a virus.  He has a mild cough.  No GI symptoms.  He is not taking anything over-the-counter for symptoms  John Wilburn  has a past medical history of Acute bronchitis, Acute myocardial infarction, subsequent episode of care, Acute non-recurrent frontal sinusitis, Acute right-sided low back pain with right-sided sciatica, Allergic rhinitis, Allergy to poison ivy, Annual visit for general adult medical examination with abnormal findings, Bronchitis, Cellulitis of buttock, Chest pain, Colon cancer screening, Conjunctivitis, Coronary atherosclerosis, COVID-19, Detached retina, GERD (gastroesophageal reflux disease), Hematoma, Hepatitis B, Herpes zoster, History of tobacco use, HL (hearing loss) (12/28/1979), HTN (hypertension), benign, Hyperlipidemia, Lumbar disc disease, Malaise and fatigue, Myalgia, Nevus, Obesity, Obesity, morbid, BMI 40.0-49.9, Overweight (BMI 25.0-29.9), Pruritus, Right hip pain, Screening for depression, Screening PSA (prostate specific antigen), Sinusitis, bacterial, Skin lesion, Sleep apnea, obstructive, URI (upper respiratory infection), Vertiginous syndrome and labyrinthine disorder, Vertigo, and Vitamin D deficiency.      Review of Systems   Constitutional:  Negative for chills, fatigue and fever.   HENT:  Positive for ear pain, postnasal drip and sinus pain. Negative for congestion, ear discharge, facial swelling, hearing loss, rhinorrhea, sore throat and trouble swallowing.    Eyes:  Negative for visual disturbance.   Respiratory:  Positive for cough. Negative  for shortness of breath.    Cardiovascular:  Negative for chest pain.   Gastrointestinal:  Negative for abdominal pain, constipation, diarrhea, nausea and vomiting.        Objective       Current Outpatient Medications:     aspirin 81 MG EC tablet, Take 1 tablet by mouth Daily., Disp: 90 tablet, Rfl: 3    carvedilol (COREG) 25 MG tablet, Take 1 tablet by mouth 2 (Two) Times a Day., Disp: 180 tablet, Rfl: 3    cetirizine (zyrTEC) 10 MG tablet, Take 1 tablet by mouth Daily., Disp: 30 tablet, Rfl: 1    Cholecalciferol (VITAMIN D) 50 MCG (2000 UT) tablet, Take 1 tablet by mouth 2 (two) times a day., Disp: , Rfl:     cyclobenzaprine (FLEXERIL) 10 MG tablet, Take 1 tablet by mouth At Night As Needed for Muscle Spasms., Disp: 90 tablet, Rfl: 2    fluticasone (FLONASE) 50 MCG/ACT nasal spray, 1-2 sprays in each nostril once daily for allergies, Disp: 18.2 g, Rfl: 2    losartan (COZAAR) 25 MG tablet, TAKE 1 TABLET BY MOUTH DAILY, Disp: 90 tablet, Rfl: 3    metFORMIN (GLUCOPHAGE) 500 MG tablet, Take 1 tablet by mouth Daily With Breakfast., Disp: 90 tablet, Rfl: 3    nitroglycerin (NITROLINGUAL) 0.4 MG/SPRAY spray, Place 1 spray under the tongue Every 5 (Five) Minutes As Needed for Chest Pain., Disp: 1 each, Rfl: 0    rosuvastatin (CRESTOR) 20 MG tablet, Take 1 tablet by mouth Daily., Disp: 90 tablet, Rfl: 3    amoxicillin (AMOXIL) 500 MG tablet, Take 1 tablet by mouth 3 (Three) Times a Day for 10 days., Disp: 30 tablet, Rfl: 0    ciprofloxacin (CIPRO) 500 MG tablet, Take 1 tablet by mouth 2 (Two) Times a Day. (Patient not taking: Reported on 8/7/2024), Disp: 20 tablet, Rfl: 0    metroNIDAZOLE (FLAGYL) 500 MG tablet, Take 1 tablet by mouth 3 (Three) Times a Day. Do not use alcohol for at least 24 hours after the last dose. (Patient not taking: Reported on 8/7/2024), Disp: 30 tablet, Rfl: 0    Vital Signs:      /83 (BP Location: Right arm, Patient Position: Sitting, Cuff Size: Large Adult)   Pulse 60   Temp 97.4 °F  "(36.3 °C) (Infrared)   Resp 16   Ht 165.1 cm (65\")   Wt 110 kg (243 lb)   SpO2 96%   BMI 40.44 kg/m²     Vitals:    08/07/24 1012   BP: 129/83   BP Location: Right arm   Patient Position: Sitting   Cuff Size: Large Adult   Pulse: 60   Resp: 16   Temp: 97.4 °F (36.3 °C)   TempSrc: Infrared   SpO2: 96%   Weight: 110 kg (243 lb)   Height: 165.1 cm (65\")      Physical Exam  Vitals and nursing note reviewed.   Constitutional:       General: He is not in acute distress.     Appearance: Normal appearance. He is well-developed. He is obese.   HENT:      Head: Normocephalic and atraumatic.      Right Ear: Tympanic membrane, ear canal and external ear normal.      Left Ear: Tympanic membrane, ear canal and external ear normal.      Ears:      Comments: Bilateral air-fluid levels     Nose: Congestion present.      Right Sinus: Frontal sinus tenderness present.      Left Sinus: Frontal sinus tenderness present.      Mouth/Throat:      Mouth: Mucous membranes are moist.      Dentition: Normal dentition.      Tongue: No lesions.      Pharynx: Uvula midline.      Comments: Postnasal discharge  Eyes:      Conjunctiva/sclera: Conjunctivae normal.      Pupils: Pupils are equal, round, and reactive to light.   Cardiovascular:      Rate and Rhythm: Regular rhythm.      Heart sounds: Normal heart sounds. No murmur heard.     No friction rub. No gallop.   Pulmonary:      Effort: Pulmonary effort is normal.      Breath sounds: Normal breath sounds. No wheezing or rhonchi.   Abdominal:      General: Bowel sounds are normal. There is no distension.      Palpations: Abdomen is soft.      Tenderness: There is no abdominal tenderness.   Musculoskeletal:         General: Normal range of motion.      Cervical back: Normal range of motion and neck supple.   Skin:     General: Skin is warm and dry.   Neurological:      General: No focal deficit present.      Mental Status: He is alert.   Psychiatric:         Mood and Affect: Mood normal.    "      Behavior: Behavior normal.        Result Review :                  PHQ-9 Total Score:             Assessment and Plan    Diagnoses and all orders for this visit:    1. Acute non-recurrent pansinusitis (Primary)  Comments:  Rx for antibiotic as well as recommendation Mucinex    2. Class 3 severe obesity due to excess calories with serious comorbidity and body mass index (BMI) of 40.0 to 44.9 in adult    3. Sore throat  Comments:  Strep negative.  Posterior discharge  Orders:  -     POC Rapid Strep A  -     POCT SARS-CoV-2 + Flu Antigen LATONIA    4. Acute cough  Comments:  COVID-negative    Other orders  -     amoxicillin (AMOXIL) 500 MG tablet; Take 1 tablet by mouth 3 (Three) Times a Day for 10 days.  Dispense: 30 tablet; Refill: 0         Problem List Items Addressed This Visit          Active Problems    Class 3 severe obesity due to excess calories with serious comorbidity and body mass index (BMI) of 40.0 to 44.9 in adult     Other Visit Diagnoses       Acute non-recurrent pansinusitis    -  Primary    Rx for antibiotic as well as recommendation Mucinex    Sore throat        Strep negative.  Posterior discharge    Relevant Orders    POC Rapid Strep A (Completed)    POCT SARS-CoV-2 + Flu Antigen LATONIA (Completed)    Acute cough        COVID-negative            Follow Up   Return if symptoms worsen or fail to improve.  Patient was given instructions and counseling regarding his condition or for health maintenance advice. Please see specific information pulled into the AVS if appropriate.

## 2024-08-12 ENCOUNTER — TELEPHONE (OUTPATIENT)
Dept: FAMILY MEDICINE CLINIC | Facility: CLINIC | Age: 69
End: 2024-08-12
Payer: MEDICARE

## 2024-08-12 DIAGNOSIS — R05.1 ACUTE COUGH: Primary | ICD-10-CM

## 2024-08-12 RX ORDER — HYDROCODONE BITARTRATE AND HOMATROPINE METHYLBROMIDE ORAL SOLUTION 5; 1.5 MG/5ML; MG/5ML
5 LIQUID ORAL NIGHTLY PRN
Qty: 180 ML | Refills: 0 | Status: CANCELLED | OUTPATIENT
Start: 2024-08-12

## 2024-08-12 NOTE — TELEPHONE ENCOUNTER
John was seen by Magalis Thomas on 8/7/2024. He was seen for Sore Throat and Earache. Negative for Flu, Covid, and Strep. Patient was prescribed Amoxicillin 500mg TID for 10 days.

## 2024-08-12 NOTE — TELEPHONE ENCOUNTER
----- Message from [facility] [employer] sent at 8/12/2024  8:59 AM EDT -----  Regarding: Requesting Cough Medicine  Contact: [phone]  Was given Antibiotic for sinus infection last week.  Now I have bronchitis coughing a lot and having trouble sleeping. Would you please call in a prescription for cough medicine?    Thanks,  John Wilburn

## 2024-08-12 NOTE — TELEPHONE ENCOUNTER
----- Message from [email or web address] sent at 8/12/2024  8:59 AM EDT -----  Regarding: Requesting Cough Medicine  Contact: [phone]  Was given Antibiotic for sinus infection last week.  Now I have bronchitis coughing a lot and having trouble sleeping. Would you please call in a prescription for cough medicine?    Thanks,  John Wilburn

## 2024-08-14 ENCOUNTER — PATIENT MESSAGE (OUTPATIENT)
Dept: FAMILY MEDICINE CLINIC | Facility: CLINIC | Age: 69
End: 2024-08-14
Payer: MEDICARE

## 2024-08-16 ENCOUNTER — TELEPHONE (OUTPATIENT)
Dept: FAMILY MEDICINE CLINIC | Facility: CLINIC | Age: 69
End: 2024-08-16
Payer: MEDICARE

## 2024-08-16 DIAGNOSIS — R05.9 COUGH, UNSPECIFIED TYPE: ICD-10-CM

## 2024-08-16 DIAGNOSIS — R10.32 LEFT LOWER QUADRANT ABDOMINAL PAIN: ICD-10-CM

## 2024-08-16 DIAGNOSIS — R30.0 DYSURIA: ICD-10-CM

## 2024-08-16 DIAGNOSIS — K57.92 DIVERTICULITIS: ICD-10-CM

## 2024-08-16 RX ORDER — CEPHALEXIN 500 MG/1
500 CAPSULE ORAL 3 TIMES DAILY
Qty: 30 CAPSULE | Refills: 0 | Status: SHIPPED | OUTPATIENT
Start: 2024-08-16

## 2024-08-16 RX ORDER — METRONIDAZOLE 500 MG/1
500 TABLET ORAL 3 TIMES DAILY
Qty: 30 TABLET | Refills: 0 | Status: SHIPPED | OUTPATIENT
Start: 2024-08-16

## 2024-08-16 NOTE — TELEPHONE ENCOUNTER
Okay to send in another round of cephalexin 500 twice daily for 15 days and metronidazole 500 mg 3 times a day for 15 days, he should let me know if he does not improve, thanks

## 2024-08-28 ENCOUNTER — TELEPHONE (OUTPATIENT)
Dept: NEUROLOGY | Facility: CLINIC | Age: 69
End: 2024-08-28

## 2024-09-22 DIAGNOSIS — I10 ESSENTIAL HYPERTENSION: ICD-10-CM

## 2024-09-23 RX ORDER — CARVEDILOL 25 MG/1
25 TABLET ORAL 2 TIMES DAILY
Qty: 180 TABLET | Refills: 3 | Status: SHIPPED | OUTPATIENT
Start: 2024-09-23

## 2024-10-28 NOTE — PROGRESS NOTES
Subjective   John Wilburn is a 69 y.o. male.     Chief Complaint   Patient presents with    Diabetes    Hyperlipidemia    Hypertension       Diabetes  He presents for his follow-up diabetic visit. He has type 2 diabetes mellitus. Pertinent negatives for hypoglycemia include no headaches, pallor, seizures or sweats. Pertinent negatives for diabetes include no blurred vision, no chest pain, no foot ulcerations, no polydipsia and no polyphagia. Current diabetic treatment includes oral agent (monotherapy). Meal planning includes avoidance of concentrated sweets. He participates in exercise intermittently. (John checks his blood sugar occasionally if he is having fatigue or concerns of possible blood sugar issues. Reports recently runs around 110s) An ACE inhibitor/angiotensin II receptor blocker is not being taken.   Hyperlipidemia  This is a chronic problem. The current episode started more than 1 year ago. Recent lipid tests were reviewed and are low. Pertinent negatives include no chest pain, focal weakness, leg pain or shortness of breath. Current antihyperlipidemic treatment includes statins. The current treatment provides mild improvement of lipids. There are no compliance problems.  Risk factors for coronary artery disease include dyslipidemia, hypertension, male sex and obesity.   Hypertension  This is a chronic problem. The current episode started more than 1 year ago. The problem is unchanged. Pertinent negatives include no anxiety, blurred vision, chest pain, headaches, malaise/fatigue, palpitations, shortness of breath or sweats. There are no associated agents to hypertension. Risk factors for coronary artery disease include male gender, dyslipidemia and obesity. Past treatments include lifestyle changes. Current antihypertension treatment includes beta blockers and ACE inhibitors. The current treatment provides moderate improvement. There are no compliance problems.             I personally reviewed and  updated the patient's allergies, medications, problem list, and past medical, surgical, social, and family history. I have reviewed and confirmed the accuracy of the History of Present Illness and Review of Symptoms as documented by the MA/DEJONN/RN. Kian Weston MD    Family History   Problem Relation Age of Onset    Hypertension Mother     Diabetes Mother             Heart disease Mother     Heart attack Father     Rheumatic fever Father     No Known Problems Sister     No Known Problems Brother     No Known Problems Maternal Aunt     No Known Problems Maternal Uncle     No Known Problems Paternal Aunt     No Known Problems Paternal Uncle     No Known Problems Maternal Grandmother     No Known Problems Maternal Grandfather     No Known Problems Paternal Grandmother     No Known Problems Paternal Grandfather     No Known Problems Other     Anemia Neg Hx     Arrhythmia Neg Hx     Asthma Neg Hx     Clotting disorder Neg Hx     Fainting Neg Hx     Heart failure Neg Hx     Hyperlipidemia Neg Hx        Social History     Tobacco Use    Smoking status: Former     Current packs/day: 0.00     Average packs/day: 0.5 packs/day for 15.0 years (7.5 ttl pk-yrs)     Types: Cigarettes     Start date: 1975     Quit date: 1990     Years since quittin.8     Passive exposure: Past    Smokeless tobacco: Never   Vaping Use    Vaping status: Never Used   Substance Use Topics    Alcohol use: Not Currently     Alcohol/week: 2.0 standard drinks of alcohol     Types: 2 Cans of beer per week    Drug use: No       Past Surgical History:   Procedure Laterality Date    CATARACT EXTRACTION, BILATERAL Bilateral     CORONARY STENT PLACEMENT      EYE SURGERY  10/16/2019    repair detached retina    NASAL SEPTUM SURGERY         Patient Active Problem List   Diagnosis    Acute myocardial infarction, subsequent episode of care    Acute right-sided low back pain with right-sided sciatica    Allergic rhinitis    Coronary  angioplasty status    Edema    Welcome to Medicare preventive visit    Colon cancer screening    Screening PSA (prostate specific antigen)    GERD (gastroesophageal reflux disease)    Hepatitis B    History of tobacco use    HTN (hypertension), benign    Hyperlipidemia    Lumbar disc disease    Vertiginous syndrome and labyrinthine disorder    Nevus    Obstructive sleep apnea    Class 3 severe obesity due to excess calories with serious comorbidity and body mass index (BMI) of 40.0 to 44.9 in adult    Right hip pain    Vitamin D deficiency    Tendinitis of right rotator cuff    Chest pain in adult    Elevated fasting glucose    Coronary artery disease involving native coronary artery of native heart    Bradycardia    Screening for malignant neoplasm of colon    History of retinal detachment    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    Type 2 diabetes mellitus with circulatory disorder, without long-term current use of insulin         Current Outpatient Medications:     aspirin 81 MG EC tablet, Take 1 tablet by mouth Daily., Disp: 90 tablet, Rfl: 3    carvedilol (COREG) 25 MG tablet, TAKE 1 TABLET BY MOUTH TWICE DAILY, Disp: 180 tablet, Rfl: 3    cetirizine (zyrTEC) 10 MG tablet, Take 1 tablet by mouth Daily., Disp: 30 tablet, Rfl: 1    Cholecalciferol (VITAMIN D) 50 MCG (2000 UT) tablet, Take 1 tablet by mouth 2 (two) times a day., Disp: , Rfl:     cyclobenzaprine (FLEXERIL) 10 MG tablet, Take 1 tablet by mouth At Night As Needed for Muscle Spasms., Disp: 90 tablet, Rfl: 2    fluticasone (FLONASE) 50 MCG/ACT nasal spray, 1-2 sprays in each nostril once daily for allergies, Disp: 18.2 g, Rfl: 2    losartan (COZAAR) 25 MG tablet, TAKE 1 TABLET BY MOUTH DAILY, Disp: 90 tablet, Rfl: 3    metFORMIN (GLUCOPHAGE) 500 MG tablet, Take 1 tablet by mouth Daily With Breakfast., Disp: 90 tablet, Rfl: 3    nitroglycerin (NITROLINGUAL) 0.4 MG/SPRAY spray, Place 1 spray under the tongue Every 5 (Five) Minutes As Needed for Chest  "Pain., Disp: 1 each, Rfl: 0    rosuvastatin (CRESTOR) 20 MG tablet, Take 1 tablet by mouth Daily., Disp: 90 tablet, Rfl: 3          Review of Systems   Constitutional:  Negative for chills, diaphoresis and malaise/fatigue.   Eyes:  Negative for blurred vision and visual disturbance.   Respiratory:  Negative for shortness of breath.    Cardiovascular:  Negative for chest pain and palpitations.   Gastrointestinal:  Negative for abdominal pain and nausea.   Endocrine: Negative for polydipsia and polyphagia.   Musculoskeletal:  Negative for neck stiffness.   Skin:  Negative for color change and pallor.   Neurological:  Negative for focal weakness, seizures and syncope.   Hematological:  Negative for adenopathy.   Psychiatric/Behavioral:  Negative for hallucinations and suicidal ideas.        I have reviewed and confirmed the accuracy of the ROS as documented by the MA/LPN/RN Kian Weston MD      Objective   /80 (BP Location: Right arm, Patient Position: Sitting, Cuff Size: Large Adult)   Pulse 67   Temp 98.1 °F (36.7 °C) (Temporal)   Resp 18   Ht 165.1 cm (65\")   Wt 114 kg (252 lb)   SpO2 100%   BMI 41.93 kg/m²   BP Readings from Last 3 Encounters:   10/29/24 128/80   08/07/24 129/83   04/30/24 124/72     Wt Readings from Last 3 Encounters:   10/29/24 114 kg (252 lb)   08/07/24 110 kg (243 lb)   04/30/24 111 kg (245 lb)           Data / Lab Results:    Hemoglobin A1C   Date Value Ref Range Status   10/29/2024 5.7 4.5 - 5.7 % Final   04/30/2024 5.8 (A) 4.5 - 5.7 % Final   01/15/2024 6.9 (A) 4.5 - 5.7 % Final        Lab Results   Component Value Date    LDL 35 03/01/2024    LDL 64 02/23/2023    LDL 69 01/28/2022     Lab Results   Component Value Date    CHOL 114 08/08/2020    CHOL 132 04/05/2019    CHOL 130 10/19/2018     Lab Results   Component Value Date    TRIG 103 03/01/2024    TRIG 100 02/23/2023    TRIG 178 (H) 01/28/2022     Lab Results   Component Value Date    HDL 28 (L) 03/01/2024    HDL 32 (L) " "02/23/2023    HDL 35 (L) 01/28/2022     Lab Results   Component Value Date    PSA 0.5 03/01/2024    PSA 0.3 02/23/2023    PSA 0.3 01/28/2022     Lab Results   Component Value Date    WBC 8.9 03/05/2024    HGB 14.2 03/05/2024    HCT 41.4 03/05/2024    MCV 89 03/05/2024     03/05/2024     Lab Results   Component Value Date    TSH 1.880 03/01/2024      Lab Results   Component Value Date    GLUCOSE 106 (H) 03/05/2024    BUN 15 03/05/2024    CREATININE 0.99 03/05/2024    EGFRIFNONA 68 01/28/2022    EGFRIFAFRI 79 01/28/2022    BCR 15 03/05/2024    K 4.7 03/05/2024    CO2 26 03/05/2024    CALCIUM 9.5 03/05/2024    PROTENTOTREF 7.1 03/05/2024    ALBUMIN 4.7 03/05/2024    LABIL2 2.0 03/05/2024    AST 25 03/05/2024    ALT 37 03/05/2024     No results found for: \"MAT\", \"RF\", \"SEDRATE\"   No results found for: \"CRP\"   No results found for: \"IRON\", \"TIBC\", \"FERRITIN\"   Lab Results   Component Value Date    UPRIHMDX25 481 04/03/2018            Physical Exam  Constitutional:       Appearance: Normal appearance. He is well-developed. He is not diaphoretic.   HENT:      Head: Normocephalic and atraumatic.      Right Ear: Tympanic membrane, ear canal and external ear normal.      Left Ear: Tympanic membrane, ear canal and external ear normal.      Nose: Nose normal.      Mouth/Throat:      Mouth: Mucous membranes are moist.      Pharynx: No oropharyngeal exudate or posterior oropharyngeal erythema.   Eyes:      General: Lids are normal.      Extraocular Movements: Extraocular movements intact.      Right eye: No nystagmus.      Left eye: No nystagmus.      Conjunctiva/sclera: Conjunctivae normal.      Right eye: Right conjunctiva is not injected. No hemorrhage.     Left eye: Left conjunctiva is not injected. No hemorrhage.     Pupils: Pupils are equal, round, and reactive to light.      Funduscopic exam:     Right eye: No hemorrhage, exudate, AV nicking, arteriolar narrowing or papilledema.         Left eye: No hemorrhage, " exudate, AV nicking, arteriolar narrowing or papilledema.   Neck:      Thyroid: No thyromegaly.      Vascular: No carotid bruit or JVD.      Trachea: No tracheal deviation.   Cardiovascular:      Rate and Rhythm: Normal rate and regular rhythm.      Pulses: Normal pulses.      Heart sounds: Normal heart sounds, S1 normal and S2 normal. No murmur heard.     No friction rub. No gallop.   Pulmonary:      Effort: Pulmonary effort is normal. No accessory muscle usage.      Breath sounds: Normal breath sounds. No stridor. No decreased breath sounds, wheezing, rhonchi or rales.   Abdominal:      General: Bowel sounds are normal. There is no distension.      Palpations: Abdomen is soft. Abdomen is not rigid. There is no mass or pulsatile mass.      Tenderness: There is no abdominal tenderness. There is no guarding or rebound. Negative signs include Khanna's sign.      Hernia: No hernia is present.   Lymphadenopathy:      Head:      Right side of head: No submental, submandibular, tonsillar, preauricular, posterior auricular or occipital adenopathy.      Left side of head: No submental, submandibular, tonsillar, preauricular, posterior auricular or occipital adenopathy.      Cervical: No cervical adenopathy.   Skin:     General: Skin is warm and dry.      Coloration: Skin is not pale.   Neurological:      Mental Status: He is alert and oriented to person, place, and time.      Cranial Nerves: No cranial nerve deficit.      Sensory: No sensory deficit.      Motor: No tremor, abnormal muscle tone or seizure activity.      Coordination: Coordination normal.      Gait: Gait normal.      Deep Tendon Reflexes: Reflexes are normal and symmetric.           Assessment & Plan      Medications        Problem List         LOS  Health maintenance.   doing well, vaccines current.  Recommend update tetanus vaccine at the pharmacy.  Baby aspirin daily.  Discussed health maintenance, screening test, lifestyle modification.  AAA ultrasound  scheduled.  Coronary artery disease.  Stable, followed by cardiology Dr. escobedo.  History of repeat stenting 2015.  Cardiac stress testing benign 2021.  Continue risk factor reduction.  Remote history of carotid Dopplers, recommend repeat, he declined secondary to cost but states he will consider in future.  Hypertension.  Good control.  Tolerating carvedilol.  Discussed low-sodium diet.  Type 2 diabetes.  New onset, improved today, has been working on diet, A1c to 5.7.  Tolerating metformin.  Discussed diet, exercise and lifestyle modification, follow-up recheck A1c.  He is working on diet, has lost weight.  Recommend add Ozempic he declines.  Hyperlipidemia.  Improved on Crestor.  Plans to start weight watchers, follow-up recheck fasting labs.  Retinal detachment.  December/2019.  Improved status post surgery.  Followed by ophthalmology.  Lumbar disc disease.  Flare currently/lumbar muscle sprain, no radiculopathy.  Continue muscle relaxants, rehabilitation exercises discussed, consider PT.  Start prednisone.  Follow-up recheck.  Consider imaging if persistent symptoms.  Followed by pain management/neurosurgery, has had repeat imagery..  s/p course epidural, consider radiofrequency ablation.  Prostate screening.  Follow-up check PSA.  GERD.  Stable on PPI.  EGD benign/dilation only 2019.  Colon cancer screening.  Colonoscopy benign 2019.  Repeat eval 10 years.  IRENE.  Stable on CPAP.  Rotator cuff tendinitis.    Right.  Much improved today status post injection.  Rehabilitation exercises discussed.  Consider imaging, Ortho referral if persistent symptoms.  Elevated fasting blood sugar.  Discussed diet, exercise lifestyle modification.  Improved today, A1c to 6.4.  Discussed diet, exercise Cel[place] modification.  Follow-up 3 to 4 months.  Plan start Rx if persistent elevation.  Vocal tic.  Mild, infrequent symptoms currently.  Consider guanfacine.  Tremor.  Intermittent, benign exam today.  TSH normal.  Possibly  secondary to benign physiologic tremor.  Follow-up recheck.  Consider further eval if worsening symptoms.  OA left shoulder.  Ice, Tylenol, rehabilitation exercise discussed.  Continue as needed nightly muscle relaxant.  Consider imaging if persistent symptoms.  Joint injection declined.  Carpal tunnel.  Improved with nightly bracing.  COVID-19 vaccine status: Vaccinated.  Recommend booster.   Lateral epicondylitis.  Improved today status post injection. Ice, rehabilitation exercise discussed.  Counterforce bracing.  Call return if persistent symptoms.  COVID-19.  With cough, fever, no respiratory difficulty.  Has been vaccinated.  Significant bacterial sinusitis on exam, start antibiotics/prednisone/antivirals.  Continue coated baby aspirin daily.  Quarantine.  Signs symptom progression severe disease discussed, call or return if worsening symptoms.  Diverticulitis.  Clinically improved today, advancing activity, tolerating antibiotics, completing course confirmed per CT, CBC normal.  Consider repeat course antibiotics if persistent symptoms.  Recommend follow-up colonoscopy [facility] referral scheduled.  Possible necessity admission for IV antibiotics discussed.  Call return if fever, unable keep fluids down, worsening symptoms.        Diagnoses and all orders for this visit:    1. Type 2 diabetes mellitus with other circulatory complication, without long-term current use of insulin (Primary)  -     POC Glycosylated Hemoglobin (Hb A1C)    2. Essential hypertension    3. Dyslipidemia    4. Class 3 severe obesity due to excess calories with serious comorbidity and body mass index (BMI) of 40.0 to 44.9 in adult    5. History of tobacco use    6. Need for influenza vaccination  -     Fluzone High-Dose 65+yrs    7. Coronary artery disease involving native coronary artery of native heart without angina pectoris        Class 3 Severe Obesity (BMI >=40). Obesity-related health conditions include the following: hypertension,  diabetes mellitus, and dyslipidemias. Obesity is unchanged. BMI is is above average; BMI management plan is completed. We discussed portion control and increasing exercise.      Expected course, medications, and adverse effects discussed.  Call or return if worsening or persistent symptoms.  I wore protective equipment throughout this patient encounter including a mask, gloves, and eye protection.  Hand hygiene was performed before donning protective equipment and after removal when leaving the room.  The complete contents of the Assessment and Plan and Data/Labs Results as documented above have been reviewed and addressed by myself with the patient today as part of an ongoing evaluation / treatment plan.  If some of the documentation has been copied from a previous note and is unchanged it indicates that this problem / plan has been assessed today but is stable from a previous visit and no changes have been recommended.

## 2024-10-29 ENCOUNTER — OFFICE VISIT (OUTPATIENT)
Dept: FAMILY MEDICINE CLINIC | Facility: CLINIC | Age: 69
End: 2024-10-29
Payer: MEDICARE

## 2024-10-29 VITALS
SYSTOLIC BLOOD PRESSURE: 128 MMHG | WEIGHT: 252 LBS | DIASTOLIC BLOOD PRESSURE: 80 MMHG | BODY MASS INDEX: 41.99 KG/M2 | HEIGHT: 65 IN | HEART RATE: 67 BPM | TEMPERATURE: 98.1 F | OXYGEN SATURATION: 100 % | RESPIRATION RATE: 18 BRPM

## 2024-10-29 DIAGNOSIS — E11.59 TYPE 2 DIABETES MELLITUS WITH OTHER CIRCULATORY COMPLICATION, WITHOUT LONG-TERM CURRENT USE OF INSULIN: Primary | ICD-10-CM

## 2024-10-29 DIAGNOSIS — E78.5 DYSLIPIDEMIA: ICD-10-CM

## 2024-10-29 DIAGNOSIS — Z87.891 HISTORY OF TOBACCO USE: ICD-10-CM

## 2024-10-29 DIAGNOSIS — E66.813 CLASS 3 SEVERE OBESITY DUE TO EXCESS CALORIES WITH SERIOUS COMORBIDITY AND BODY MASS INDEX (BMI) OF 40.0 TO 44.9 IN ADULT: ICD-10-CM

## 2024-10-29 DIAGNOSIS — E66.01 CLASS 3 SEVERE OBESITY DUE TO EXCESS CALORIES WITH SERIOUS COMORBIDITY AND BODY MASS INDEX (BMI) OF 40.0 TO 44.9 IN ADULT: ICD-10-CM

## 2024-10-29 DIAGNOSIS — Z12.5 SCREENING PSA (PROSTATE SPECIFIC ANTIGEN): ICD-10-CM

## 2024-10-29 DIAGNOSIS — Z23 NEED FOR INFLUENZA VACCINATION: ICD-10-CM

## 2024-10-29 DIAGNOSIS — I25.10 CORONARY ARTERY DISEASE INVOLVING NATIVE CORONARY ARTERY OF NATIVE HEART WITHOUT ANGINA PECTORIS: ICD-10-CM

## 2024-10-29 DIAGNOSIS — I10 ESSENTIAL HYPERTENSION: ICD-10-CM

## 2024-10-29 DIAGNOSIS — Z00.00 MEDICARE ANNUAL WELLNESS VISIT, SUBSEQUENT: ICD-10-CM

## 2024-10-29 LAB
EXPIRATION DATE: NORMAL
HBA1C MFR BLD: 5.7 % (ref 4.5–5.7)
Lab: NORMAL

## 2024-10-29 PROCEDURE — G0008 ADMIN INFLUENZA VIRUS VAC: HCPCS | Performed by: FAMILY MEDICINE

## 2024-10-29 PROCEDURE — 3044F HG A1C LEVEL LT 7.0%: CPT | Performed by: FAMILY MEDICINE

## 2024-10-29 PROCEDURE — 3079F DIAST BP 80-89 MM HG: CPT | Performed by: FAMILY MEDICINE

## 2024-10-29 PROCEDURE — 3074F SYST BP LT 130 MM HG: CPT | Performed by: FAMILY MEDICINE

## 2024-10-29 PROCEDURE — 1126F AMNT PAIN NOTED NONE PRSNT: CPT | Performed by: FAMILY MEDICINE

## 2024-10-29 PROCEDURE — 90662 IIV NO PRSV INCREASED AG IM: CPT | Performed by: FAMILY MEDICINE

## 2024-10-29 PROCEDURE — 83036 HEMOGLOBIN GLYCOSYLATED A1C: CPT | Performed by: FAMILY MEDICINE

## 2024-10-29 PROCEDURE — 99214 OFFICE O/P EST MOD 30 MIN: CPT | Performed by: FAMILY MEDICINE

## 2024-11-02 DIAGNOSIS — E78.5 DYSLIPIDEMIA: ICD-10-CM

## 2024-11-04 RX ORDER — ROSUVASTATIN CALCIUM 20 MG/1
20 TABLET, COATED ORAL DAILY
Qty: 90 TABLET | Refills: 3 | Status: SHIPPED | OUTPATIENT
Start: 2024-11-04 | End: 2024-11-09 | Stop reason: SDUPTHER

## 2024-11-09 DIAGNOSIS — E78.5 DYSLIPIDEMIA: ICD-10-CM

## 2024-11-11 RX ORDER — ROSUVASTATIN CALCIUM 20 MG/1
20 TABLET, COATED ORAL DAILY
Qty: 90 TABLET | Refills: 3 | Status: SHIPPED | OUTPATIENT
Start: 2024-11-11

## 2024-12-16 NOTE — PROGRESS NOTES
2024  Sleep medicine follow-up visit    John Wilburn   1955  69 y.o. male   DATE OF SERVICE: 2024     Patient is here for follow up on IRENE, he wears Full face mask and gets his supplies from [place].   He is sleeping well    SLEEP TESTING HISTORY:    On NPSG at [facility], 21 patient had Moderate obstructive sleep apnea syndrome with apnea-hypopnea index of 29.7 per sleep hour, minimum SpO2 of 82%       The compliance data reviewed and the patient is on CPAP therapy at 7-20 cm/H2O and compliance data indicates excellent compliance with 100% usage for more than 4 hours with an average usage of 7 hours 53 minutes. AHI down to 1.1 with CPAP therapy and median CPAP pressure 12.3 cm of water.  The patient's hypersomnia also resolved with Sarona Sleepiness Scale score of 0 with CPAP therapy.  The patient feels great and is benefiting from it and is compliant.       EPWORTH SLEEPINESS SCALE  Sitting and reading JS Sarona Sleepiness: 0 WatchingTV JS Sarona Sleepiness: 0  Sitting, inactive, in a public place JS Sarona Sleepiness: 0  As a passenger in a car for 1 hour w/o a break  JS Sarona Sleepiness: 0  Lying down to rest in the afternoon  JS Sarona Sleepiness: 0  Sitting and talking to someone  JS Sarona Sleepiness: 0  Sitting quietly after a lunch  JS Sarona Sleepiness: 0  In a car, while stopped for traffic or a light  JS Sarona Sleepiness: 0  Total 0      Review of Systems   All other systems reviewed and are negative.    I reviewed and addressed ROS entered by MA.        The following portions of the patient's history were reviewed and updated as appropriate: allergies, current medications, past family history, past medical history, past social history, past surgical history and problem list.      Family History   Problem Relation Age of Onset    Hypertension Mother     Diabetes Mother             Heart disease Mother     Heart attack Father     Rheumatic fever Father     No Known  Problems Sister     No Known Problems Brother     No Known Problems Maternal Aunt     No Known Problems Maternal Uncle     No Known Problems Paternal Aunt     No Known Problems Paternal Uncle     No Known Problems Maternal Grandmother     No Known Problems Maternal Grandfather     No Known Problems Paternal Grandmother     No Known Problems Paternal Grandfather     No Known Problems Other     Anemia Neg Hx     Arrhythmia Neg Hx     Asthma Neg Hx     Clotting disorder Neg Hx     Fainting Neg Hx     Heart failure Neg Hx     Hyperlipidemia Neg Hx        Past Medical History:   Diagnosis Date    Acute bronchitis     Acute myocardial infarction, subsequent episode of care     Acute non-recurrent frontal sinusitis     Acute right-sided low back pain with right-sided sciatica     Impression: persistent, long standing back pain, worse, with rle radiculopathy, h/o vertebral fx, refractory to pt xray with severe degenerative changes l5/ s1 check mri, referrall to dr amarilys arias 20 minutes bid nsaids Rehabilitation exercises discussed. continue pt    Allergic rhinitis     Allergy to poison ivy     Impression: Due to the wide spread rash he was started on Prednisone per pt request. He was advised to RTC if his symptoms did not improve.    Annual visit for general adult medical examination with abnormal findings     Impression: doing well, vaccines current Age specific anticipatory guidance and warning signs discussed. Diet, exercise, and lifestyle modification discussed. Safety, seatbelts, and routine screening examinations discussed. Discussed self-examinations.    Bronchitis     Cellulitis of buttock     Impression: possibly 2nd insect bite Topical care discussed. Discussed possible necessity of I and D. Call / return if fever, worsening symptoms.    Chest pain     Impression: atypical, improved / resolved currently possibly 2nd nausea /gi upset /viral uri serial ekg's, troponin's normal followed by cardiology in [place], he  reports negative treadmill cardiolyte 12/11 d/c to home, Follow up 2 to 3 days. Follow up with cardiology planned consider repeat stress test if chest pain on exertion, worsening symptoms.    Colon cancer screening     Impression: has a colonoscopy, will get records    Conjunctivitis     Impression: viral Topical care discussed. wash hands frequently    Coronary atherosclerosis     Impression: h/o stent 2003, 2016 followed by meng, had recent neg stress test, will get records h/o normal carptid doppler per her report, will get records continue coreg, statin, effient continue risk factor reduction recommend cardiology Follow up he states h3 will consider    COVID-19     Detached retina     GERD (gastroesophageal reflux disease)     Hematoma     Impression: hand, much improved xray neg for fracture per ed Signs and symptoms of infecton discussed. Call / return if worsening symptoms.    Hepatitis B     Impression: h/o, recheck levels    Herpes zoster     Impression: topical care discussed cover with antibiotics Follow up for zostavax    History of tobacco use     HL (hearing loss) 12/28/1979    [employer]    HTN (hypertension), benign     Impression: good control Discussed low sodium diet, lifestyle modification. Follow up recheck    Hyperlipidemia     Impression: followed by Roxana improved tolerating crestor rx, ldl to 71, + aggressive ldl target considering cad recheck    Lumbar disc disease     Impression: followed by Nick Ashton undergoing epidual injxn    Malaise and fatigue     Impression: check vitamin levels h/o low t do not recommend replacement consider age enedelia under good cobtrol consider wellbutrin Follow up recheck Call / return if worsening symptoms.    Myalgia     Impression: possibly 2nd crestor, hold x 1 to 2 mos risk/benefit RX discussed, considering restart possibly at lower dose    Nevus     Impression: path shows benign lentigo Discussed skin care, use of sun block and protective clothing.     Obesity     Obesity, morbid, BMI 40.0-49.9     DOCUMENTATION OF FOLLOW-UP PLAN FOR PATIENT WITH BMI ABOVE NORMAL ()    Overweight (BMI 25.0-29.9)     Impression: Discussed diet, exercise, lifestyle modification. Follow up [id number]    Pruritus     Right hip pain     Impression: check xray    Screening for depression     Negative Depression Screening (4 or less) ()    Screening PSA (prostate specific antigen)     Impression: psa normal / damián normal    Sinusitis, bacterial     Impression: He was prescribed Cipro and Tessalon perles. Increase fluids. Tylenol/motrin for pain or fever. Medication and medication adverse effects discussed. Follow-up 5-7 days for reevaluation if not improved or sooner if needed.    Skin lesion     Impression: rec resection / derm ref sched    Sleep apnea, obstructive     Story: on CPAP    URI (upper respiratory infection)     Impression: Increase fluid intake. Mucinex Call / return if fever, respiratory difficulty, worsening symptoms.    Vertiginous syndrome and labyrinthine disorder     Impression: Differential diagnosis discussed. Follow-up in 2 weeks if not better. Follow-up sooner for worsening symptoms or for any concerns. Zyrtec as directed.    Vertigo     Impression: likely secondary to sinusitis with eustachian tube dysfunction, rx in progress ddx includes vestibular neuritis, cover with prednisone Follow up recheck Consider imaging if persistent symptoms.    Vitamin D deficiency        Social History     Socioeconomic History    Marital status:    Tobacco Use    Smoking status: Former     Current packs/day: 0.00     Average packs/day: 0.5 packs/day for 15.0 years (7.5 ttl pk-yrs)     Types: Cigarettes     Start date: 1975     Quit date: 1990     Years since quittin.9     Passive exposure: Past    Smokeless tobacco: Never   Vaping Use    Vaping status: Never Used   Substance and Sexual Activity    Alcohol use: Not Currently     Alcohol/week: 2.0 standard  drinks of alcohol     Types: 2 Cans of beer per week    Drug use: No    Sexual activity: Yes     Partners: Female     Birth control/protection: None         Current Outpatient Medications:     aspirin 81 MG EC tablet, Take 1 tablet by mouth Daily., Disp: 90 tablet, Rfl: 3    carvedilol (COREG) 25 MG tablet, TAKE 1 TABLET BY MOUTH TWICE DAILY, Disp: 180 tablet, Rfl: 3    cetirizine (zyrTEC) 10 MG tablet, Take 1 tablet by mouth Daily., Disp: 30 tablet, Rfl: 1    Cholecalciferol (VITAMIN D) 50 MCG (2000 UT) tablet, Take 1 tablet by mouth 2 (two) times a day., Disp: , Rfl:     cyclobenzaprine (FLEXERIL) 10 MG tablet, Take 1 tablet by mouth At Night As Needed for Muscle Spasms., Disp: 90 tablet, Rfl: 2    fluticasone (FLONASE) 50 MCG/ACT nasal spray, 1-2 sprays in each nostril once daily for allergies, Disp: 18.2 g, Rfl: 2    losartan (COZAAR) 25 MG tablet, TAKE 1 TABLET BY MOUTH DAILY, Disp: 90 tablet, Rfl: 3    metFORMIN (GLUCOPHAGE) 500 MG tablet, Take 1 tablet by mouth Daily With Breakfast., Disp: 90 tablet, Rfl: 3    nitroglycerin (NITROLINGUAL) 0.4 MG/SPRAY spray, Place 1 spray under the tongue Every 5 (Five) Minutes As Needed for Chest Pain., Disp: 1 each, Rfl: 0    rosuvastatin (CRESTOR) 20 MG tablet, Take 1 tablet by mouth Daily., Disp: 90 tablet, Rfl: 3    Allergies   Allergen Reactions    Vicodin [Hydrocodone-Acetaminophen] Hives    Atorvastatin Hives    Propoxyphene Hives        PHYSICAL EXAMINATION:  Vitals:    12/17/24 1308   BP: 155/77   Pulse: 67      Body mass index is 42.6 kg/m².       HEENT: Normal.    CARDIAC: Normal.   LUNGS: Clear to auscultation.   EXTREMITIES: No edema.     Diagnoses and all orders for this visit:    1. Obstructive sleep apnea (Primary)       The patient was cautioned that obstructive sleep disordered breathing might be aggravated by certain conditions such as post anesthetic states or respiratory allergies.  Medications such as sedatives, hypnotics, muscle relaxants, opiate  analgesics and or alcohol can aggravate the underlying obstructive sleep disordered breathing.If the patient is significantly drowsy or inattentive during the daytime, should be advised to avoid situations such as driving in which safety could be compromised by impairment of alertness.  Do not drive if drowsy. Mask, Tubing, and Filters per DME. Use 4-6 hrs every night minimum. Call with any questions or concerns.      No follow-ups on file.    I spent 11 minutes caring for John on this date of service. This time includes time spent by me in the following activities: preparing for the visit, reviewing tests, obtaining and/or reviewing a separately obtained history, performing a medically appropriate examination and/or evaluation, counseling and educating the patient/family/caregiver, ordering medications, tests, or procedures, documenting information in the medical record, and independently interpreting results and communicating that information with the patient/family/caregiver.      This document has been electronically signed by Katharina TOMAS on December 17, 2024 13:16 EST

## 2024-12-17 ENCOUNTER — OFFICE VISIT (OUTPATIENT)
Dept: NEUROLOGY | Facility: CLINIC | Age: 69
End: 2024-12-17
Payer: MEDICARE

## 2024-12-17 VITALS
HEART RATE: 67 BPM | WEIGHT: 256 LBS | SYSTOLIC BLOOD PRESSURE: 155 MMHG | DIASTOLIC BLOOD PRESSURE: 77 MMHG | BODY MASS INDEX: 42.65 KG/M2 | HEIGHT: 65 IN

## 2024-12-17 DIAGNOSIS — G47.33 OBSTRUCTIVE SLEEP APNEA: Primary | ICD-10-CM

## 2024-12-17 PROCEDURE — 99212 OFFICE O/P EST SF 10 MIN: CPT | Performed by: NURSE PRACTITIONER

## 2024-12-17 PROCEDURE — 3077F SYST BP >= 140 MM HG: CPT | Performed by: NURSE PRACTITIONER

## 2024-12-17 PROCEDURE — 3078F DIAST BP <80 MM HG: CPT | Performed by: NURSE PRACTITIONER

## 2024-12-17 PROCEDURE — 1159F MED LIST DOCD IN RCRD: CPT | Performed by: NURSE PRACTITIONER

## 2024-12-17 PROCEDURE — 1160F RVW MEDS BY RX/DR IN RCRD: CPT | Performed by: NURSE PRACTITIONER

## 2025-01-20 DIAGNOSIS — E11.59 TYPE 2 DIABETES MELLITUS WITH OTHER CIRCULATORY COMPLICATION, WITHOUT LONG-TERM CURRENT USE OF INSULIN: ICD-10-CM

## 2025-01-27 NOTE — PROGRESS NOTES
Subjective:     Encounter Date:01/29/2025      Patient ID: John Wilburn is a 69 y.o. male.    Chief Complaint and history of present illness:       Follow-up for CAD, PCI, hypertension, dyslipidemia, obesity with BMI of 40     History of Present Illness       Mr. John Wilburn has PMH of     #  CAD,h/o MI, multiple PCIs in [place] in the past, NSTEMI and MIGUEL to RCA 6/18/15 and LCX 06/24/2015, cath 7/8/2015 Patent stents in RCA and LCX,Borderline disease in ramus intermedius and moderate disease in proximal LAD     #.  Hypertension,  #  dyslipidemia,  Lipitor intolerant, tolerating rosuvastatin  #.  PLAVIX  RESISTANT  #.  obesity, hyperglycemia with normal hemoglobin A1c, 5.6 on  7/2015  #.   Former smoker quit in 1990  #    positive family history of heart disease in father 53, and mother  #    allergy/intolerance  to Lipitor Darvocet and Vicodin     Here for  follow-up.  Patient is complaining of WISE,night sweats.  Patient denies any chest pain but has dyspnea on exertion.  He is not exercising monitor     Patient's arterial blood pressure is 133/77, heart rate 70, O2 sat of 98% on room air.  MI is over 40.        Patient  had labs done 10/19/2018 which showed cholesterol 130 triglycerides 77 HDL 40 LDL 71 CMP was unremarkable. Labs from 4/5/2019 revealed normal CMP TSH 3.74 cholesterol 132 HDL 45 LDL 67 triglycerides 114.  Labs from 8/8/2020 reveal CMP with a glucose of 111, calcium of 8.3, cholesterol 149 triglycerides 144 HDL low at 35 LDL 50.  Labs from 1/25/2021 reveal cholesterol 103 triglycerides 90 HDL low 34, LDL 46.  A1c from 8/9/2021 was 5.8.  Labs from 2/23/2023 revealed lipid profile with cholesterol 115, triglycerides 100, HDL 32, LDL 64, normal CMP except potassium 5.4 and glucose 132.  AST 48, ALT 69.  Labs from 8/15/2023 reveal A1c of 6.4.  Labs from 3/1/2024 reveal lipid profile with cholesterol 83, triglycerides 103, HDL 28, LDL 35, CMP with glucose of 110, potassium 5.4..  Labs from  3/5/2024 reveal CMP with a glucose of 106.  Labs from 4/30/2024 reveal hemoglobin A1c of 5.8.  Labs from 10/29/2024 reveal hemoglobin A1c of 5.7.     7/8/2021      ASSESSMENT:     # WISE  # dyslipidemia  #  CAD history of MI and PCI  #  obesity with BMI over 40, glucose intolerance,   # hypertension,       PLAN:     Reviewed EKG results with patient.  Patient's dyspnea on exertion could be anginal equivalent.  Will do a stress test.  Patient is not sure if he can walk due to hip and back pain and deconditioning and dyspnea on exertion.  Will do Lexiscan Cardiolite.  Reviewed BMI over 40, counseled on weight loss diet and exercise.  To help with obesity as well as low HDL.  Patient previously was intolerant to Lipitor, but is tolerating Crestor okay.  Will continue Crestor for dyslipidemia.  Reviewed lab results with patient.  Continue aspirin, carvedilol, losartan, Crestor to help with CAD, MI, PCI, hypertension, dyslipidemia.  Patient's LFTs have normalized.  Patient had mild plaque in carotids.  Will continue to monitor.  Patient reportedly had CT abdomen and pelvis in March 2024 which showed diverticulitis.  Reviewed results with patient.  Will follow and consider further evaluation treatment.        Procedures:    Lexiscan Cardiolite 7/29/2021 normal perfusion EF of 70%  Carotid Dopplers 4/6/2023 less than 50%.    AAA screening no evidence of aortic aneurysm          ECG 12 Lead    Date/Time: 1/29/2025 10:13 AM  Performed by: Sigifredo Schmidt MD    Authorized by: Sigifredo Schmidt MD  Comparison: compared with previous ECG from 1/24/2024  Comparison to previous ECG: EKG done today reviewed/interpreted by me reveals sinus rhythm with rate of 61 bpm with no new change compared to EKG from 1/24/2024          Copied text in this portion of the note has been reviewed and is accurate as of 1/29/2025  The following portions of the patient's history were reviewed and updated as appropriate: allergies,  current medications, past family history, past medical history, past social history, past surgical history and problem list.    Assessment:         MDM       Diagnosis Plan   1. Dyspnea on exertion  Stress Test With Myocardial Perfusion One Day      2. Glucose intolerance  nitroglycerin (NITROLINGUAL) 0.4 MG/SPRAY spray      3. Coronary artery disease of native artery of native heart with stable angina pectoris  Stress Test With Myocardial Perfusion One Day      4. Dyslipidemia        5. Essential hypertension        6. Morbid obesity with BMI of 40.0-44.9, adult               Plan:               Past Medical History:  Past Medical History:   Diagnosis Date    Acute bronchitis     Acute myocardial infarction, subsequent episode of care     Acute non-recurrent frontal sinusitis     Acute right-sided low back pain with right-sided sciatica     Impression: persistent, long standing back pain, worse, with rle radiculopathy, h/o vertebral fx, refractory to pt xray with severe degenerative changes l5/ s1 check mri, referrall to dr amarilys arias 20 minutes bid nsaids Rehabilitation exercises discussed. continue pt    Allergic rhinitis     Allergy to poison ivy     Impression: Due to the wide spread rash he was started on Prednisone per pt request. He was advised to RTC if his symptoms did not improve.    Annual visit for general adult medical examination with abnormal findings     Impression: doing well, vaccines current Age specific anticipatory guidance and warning signs discussed. Diet, exercise, and lifestyle modification discussed. Safety, seatbelts, and routine screening examinations discussed. Discussed self-examinations.    Bronchitis     Cellulitis of buttock     Impression: possibly 2nd insect bite Topical care discussed. Discussed possible necessity of I and D. Call / return if fever, worsening symptoms.    Chest pain     Impression: atypical, improved / resolved currently possibly 2nd nausea /gi upset /viral uri  serial ekg's, troponin's normal followed by cardiology in [place], he reports negative treadmill cardiolyte 12/11 d/c to home, Follow up 2 to 3 days. Follow up with cardiology planned consider repeat stress test if chest pain on exertion, worsening symptoms.    Colon cancer screening     Impression: has a colonoscopy, will get records    Conjunctivitis     Impression: viral Topical care discussed. wash hands frequently    Coronary atherosclerosis     Impression: h/o stent 2003, 2016 followed by [facility]eng, had recent neg stress test, will get records h/o normal carptid doppler per her report, will get records continue coreg, statin, effient continue risk factor reduction recommend cardiology Follow up he states h3 will consider    COVID-19     Detached retina     GERD (gastroesophageal reflux disease)     Hematoma     Impression: hand, much improved xray neg for fracture per ed Signs and symptoms of infecton discussed. Call / return if worsening symptoms.    Hepatitis B     Impression: h/o, recheck levels    Herpes zoster     Impression: topical care discussed cover with antibiotics Follow up for zostavax    History of tobacco use     HL (hearing loss) 12/28/1979    [employer]    HTN (hypertension), benign     Impression: good control Discussed low sodium diet, lifestyle modification. Follow up recheck    Hyperlipidemia     Impression: followed by Roxana improved tolerating crestor rx, ldl to 71, + aggressive ldl target considering cad recheck    Lumbar disc disease     Impression: followed by Nick Ashton undergoing epidual injxn    Malaise and fatigue     Impression: check vitamin levels h/o low t do not recommend replacement consider age enedelia under good cobtrol consider wellbutrin Follow up recheck Call / return if worsening symptoms.    Myalgia     Impression: possibly 2nd crestor, hold x 1 to 2 mos risk/benefit RX discussed, considering restart possibly at lower dose    Nevus     Impression: path shows benign lentigo  Discussed skin care, use of sun block and protective clothing.    Obesity     Obesity, morbid, BMI 40.0-49.9     DOCUMENTATION OF FOLLOW-UP PLAN FOR PATIENT WITH BMI ABOVE NORMAL ()    Overweight (BMI 25.0-29.9)     Impression: Discussed diet, exercise, lifestyle modification. Follow up [id number]    Pruritus     Right hip pain     Impression: check xray    Screening for depression     Negative Depression Screening (4 or less) ()    Screening PSA (prostate specific antigen)     Impression: psa normal / damián normal    Sinusitis, bacterial     Impression: He was prescribed Cipro and Tessalon perles. Increase fluids. Tylenol/motrin for pain or fever. Medication and medication adverse effects discussed. Follow-up 5-7 days for reevaluation if not improved or sooner if needed.    Skin lesion     Impression: rec resection / derm ref sched    Sleep apnea, obstructive     Story: on CPAP    URI (upper respiratory infection)     Impression: Increase fluid intake. Mucinex Call / return if fever, respiratory difficulty, worsening symptoms.    Vertiginous syndrome and labyrinthine disorder     Impression: Differential diagnosis discussed. Follow-up in 2 weeks if not better. Follow-up sooner for worsening symptoms or for any concerns. Zyrtec as directed.    Vertigo     Impression: likely secondary to sinusitis with eustachian tube dysfunction, rx in progress ddx includes vestibular neuritis, cover with prednisone Follow up recheck Consider imaging if persistent symptoms.    Vitamin D deficiency      Past Surgical History:  Past Surgical History:   Procedure Laterality Date    CATARACT EXTRACTION, BILATERAL Bilateral     CORONARY STENT PLACEMENT  2003    EYE SURGERY  10/16/2019    repair detached retina    NASAL SEPTUM SURGERY        Allergies:  Allergies   Allergen Reactions    Vicodin [Hydrocodone-Acetaminophen] Hives    Atorvastatin Hives    Propoxyphene Hives     Home Meds:  Current Meds:     Current Outpatient  Medications:     aspirin 81 MG EC tablet, Take 1 tablet by mouth Daily., Disp: 90 tablet, Rfl: 3    carvedilol (COREG) 25 MG tablet, TAKE 1 TABLET BY MOUTH TWICE DAILY, Disp: 180 tablet, Rfl: 3    cetirizine (zyrTEC) 10 MG tablet, Take 1 tablet by mouth Daily. (Patient taking differently: Take 1 tablet by mouth As Needed.), Disp: 30 tablet, Rfl: 1    Cholecalciferol (VITAMIN D) 50 MCG (2000 UT) tablet, Take 1 tablet by mouth 2 (two) times a day., Disp: , Rfl:     cyclobenzaprine (FLEXERIL) 10 MG tablet, Take 1 tablet by mouth At Night As Needed for Muscle Spasms., Disp: 90 tablet, Rfl: 2    fluticasone (FLONASE) 50 MCG/ACT nasal spray, 1-2 sprays in each nostril once daily for allergies (Patient taking differently: As Needed. 1-2 sprays in each nostril once daily for allergies), Disp: 18.2 g, Rfl: 2    losartan (COZAAR) 25 MG tablet, TAKE 1 TABLET BY MOUTH DAILY, Disp: 90 tablet, Rfl: 3    metFORMIN (GLUCOPHAGE) 500 MG tablet, TAKE 1 TABLET BY MOUTH DAILY WITH BREAKFAST, Disp: 90 tablet, Rfl: 3    nitroglycerin (NITROLINGUAL) 0.4 MG/SPRAY spray, Place 1 spray under the tongue Every 5 (Five) Minutes As Needed for Chest Pain., Disp: 1 each, Rfl: 0    rosuvastatin (CRESTOR) 20 MG tablet, Take 1 tablet by mouth Daily., Disp: 90 tablet, Rfl: 3  Social History:   Social History     Tobacco Use    Smoking status: Former     Current packs/day: 0.00     Average packs/day: 0.5 packs/day for 15.0 years (7.5 ttl pk-yrs)     Types: Cigarettes     Start date: 1975     Quit date: 1990     Years since quittin.1     Passive exposure: Past    Smokeless tobacco: Never   Substance Use Topics    Alcohol use: Not Currently     Alcohol/week: 2.0 standard drinks of alcohol     Types: 2 Cans of beer per week      Family History:  Family History   Problem Relation Age of Onset    Hypertension Mother     Diabetes Mother             Heart disease Mother     Heart attack Father     Rheumatic fever Father     No Known  "Problems Sister     No Known Problems Brother     No Known Problems Maternal Aunt     No Known Problems Maternal Uncle     No Known Problems Paternal Aunt     No Known Problems Paternal Uncle     No Known Problems Maternal Grandmother     No Known Problems Maternal Grandfather     No Known Problems Paternal Grandmother     No Known Problems Paternal Grandfather     No Known Problems Other     Anemia Neg Hx     Arrhythmia Neg Hx     Asthma Neg Hx     Clotting disorder Neg Hx     Fainting Neg Hx     Heart failure Neg Hx     Hyperlipidemia Neg Hx               Review of Systems   Cardiovascular:  Positive for palpitations. Negative for chest pain and leg swelling.   Respiratory:  Negative for shortness of breath.    Neurological:  Negative for dizziness and numbness.     All other systems are negative         Objective:     Physical Exam  /77 (BP Location: Left arm, Patient Position: Sitting, Cuff Size: Large Adult)   Pulse 70   Ht 165.1 cm (65\")   Wt 117 kg (258 lb)   SpO2 98%   BMI 42.93 kg/m²   General:  Appears in no acute distress  Eyes: Sclera is anicteric,  conjunctiva is clear   HEENT:  No JVD.  No carotid bruits  Respiratory: Respirations regular and unlabored at rest.  Clear to auscultation  Cardiovascular: S1,S2 Regular rate and rhythm. .   Extremities: No digital clubbing or cyanosis, no edema  Skin: Color pink. Skin warm and dry to touch. No rashes  No xanthoma  Neuro: Alert and awake.    Lab Reviewed:         Sigifredo Schmidt MD  1/29/2025 10:24 EST      EMR Dragon/Transcription:   \"Dictated utilizing Dragon dictation\".        "

## 2025-01-29 ENCOUNTER — OFFICE VISIT (OUTPATIENT)
Dept: CARDIOLOGY | Facility: CLINIC | Age: 70
End: 2025-01-29
Payer: MEDICARE

## 2025-01-29 VITALS
DIASTOLIC BLOOD PRESSURE: 77 MMHG | WEIGHT: 258 LBS | HEART RATE: 70 BPM | BODY MASS INDEX: 42.99 KG/M2 | SYSTOLIC BLOOD PRESSURE: 133 MMHG | HEIGHT: 65 IN | OXYGEN SATURATION: 98 %

## 2025-01-29 DIAGNOSIS — I10 ESSENTIAL HYPERTENSION: ICD-10-CM

## 2025-01-29 DIAGNOSIS — E66.01 MORBID OBESITY WITH BMI OF 40.0-44.9, ADULT: ICD-10-CM

## 2025-01-29 DIAGNOSIS — E74.39 GLUCOSE INTOLERANCE: ICD-10-CM

## 2025-01-29 DIAGNOSIS — R06.09 DYSPNEA ON EXERTION: Primary | ICD-10-CM

## 2025-01-29 DIAGNOSIS — I25.118 CORONARY ARTERY DISEASE OF NATIVE ARTERY OF NATIVE HEART WITH STABLE ANGINA PECTORIS: ICD-10-CM

## 2025-01-29 DIAGNOSIS — E78.5 DYSLIPIDEMIA: ICD-10-CM

## 2025-01-29 RX ORDER — NITROGLYCERIN 400 UG/1
1 SPRAY ORAL
Qty: 1 EACH | Refills: 0 | Status: SHIPPED | OUTPATIENT
Start: 2025-01-29 | End: 2025-01-29

## 2025-01-29 RX ORDER — NITROGLYCERIN 0.4 MG/1
TABLET SUBLINGUAL
Qty: 25 TABLET | Refills: 1 | Status: SHIPPED | OUTPATIENT
Start: 2025-01-29

## 2025-02-11 ENCOUNTER — HOSPITAL ENCOUNTER (OUTPATIENT)
Dept: CARDIOLOGY | Facility: HOSPITAL | Age: 70
Discharge: HOME | End: 2025-02-11
Payer: MEDICARE

## 2025-02-11 DIAGNOSIS — R06.09 DYSPNEA ON EXERTION: ICD-10-CM

## 2025-02-11 DIAGNOSIS — I25.118 CORONARY ARTERY DISEASE OF NATIVE ARTERY OF NATIVE HEART WITH STABLE ANGINA PECTORIS: ICD-10-CM

## 2025-02-11 LAB
BH CV REST NUCLEAR ISOTOPE DOSE: 10.1 MCI
BH CV STRESS BP STAGE 1: NORMAL
BH CV STRESS COMMENTS STAGE 1: NORMAL
BH CV STRESS DOSE REGADENOSON STAGE 1: 0.4
BH CV STRESS DURATION MIN STAGE 1: 0
BH CV STRESS DURATION SEC STAGE 1: 10
BH CV STRESS HR STAGE 1: 72
BH CV STRESS NUCLEAR ISOTOPE DOSE: 32.3 MCI
BH CV STRESS PROTOCOL 1: NORMAL
BH CV STRESS RECOVERY BP: NORMAL MMHG
BH CV STRESS RECOVERY HR: 80 BPM
BH CV STRESS STAGE 1: 1
MAXIMAL PREDICTED HEART RATE: 153 BPM
SPECT HRT GATED+EF W RNC IV: 66 %
STRESS BASELINE BP: NORMAL MMHG
STRESS BASELINE HR: 72 BPM
STRESS TARGET HR: 130 BPM

## 2025-02-11 PROCEDURE — 93017 CV STRESS TEST TRACING ONLY: CPT

## 2025-02-11 PROCEDURE — 78452 HT MUSCLE IMAGE SPECT MULT: CPT

## 2025-02-11 PROCEDURE — 34310000005 TECHNETIUM SESTAMIBI: Performed by: INTERNAL MEDICINE

## 2025-02-11 PROCEDURE — A9500 TC99M SESTAMIBI: HCPCS | Performed by: INTERNAL MEDICINE

## 2025-02-11 RX ORDER — REGADENOSON 0.08 MG/ML
0.4 INJECTION, SOLUTION INTRAVENOUS
Status: DISCONTINUED | OUTPATIENT
Start: 2025-02-11 | End: 2025-02-12 | Stop reason: HOSPADM

## 2025-02-11 RX ADMIN — TECHNETIUM TC 99M SESTAMIBI 1 DOSE: 1 INJECTION INTRAVENOUS at 08:36

## 2025-02-14 ENCOUNTER — TELEPHONE (OUTPATIENT)
Dept: CARDIOLOGY | Facility: CLINIC | Age: 70
End: 2025-02-14
Payer: MEDICARE

## 2025-02-14 DIAGNOSIS — I25.118 CORONARY ARTERY DISEASE OF NATIVE ARTERY OF NATIVE HEART WITH STABLE ANGINA PECTORIS: ICD-10-CM

## 2025-02-14 DIAGNOSIS — R94.39 ABNORMAL STRESS TEST: Primary | ICD-10-CM

## 2025-02-14 DIAGNOSIS — E78.2 MIXED HYPERLIPIDEMIA: Chronic | ICD-10-CM

## 2025-02-14 DIAGNOSIS — E11.59 TYPE 2 DIABETES MELLITUS WITH OTHER CIRCULATORY COMPLICATION, WITHOUT LONG-TERM CURRENT USE OF INSULIN: ICD-10-CM

## 2025-02-14 DIAGNOSIS — E66.813 CLASS 3 SEVERE OBESITY DUE TO EXCESS CALORIES WITH SERIOUS COMORBIDITY AND BODY MASS INDEX (BMI) OF 40.0 TO 44.9 IN ADULT: ICD-10-CM

## 2025-02-14 DIAGNOSIS — I10 HTN (HYPERTENSION), BENIGN: Chronic | ICD-10-CM

## 2025-02-14 DIAGNOSIS — Z98.61 CORONARY ANGIOPLASTY STATUS: ICD-10-CM

## 2025-02-14 DIAGNOSIS — Z87.891 HISTORY OF TOBACCO USE: ICD-10-CM

## 2025-02-14 DIAGNOSIS — E66.01 CLASS 3 SEVERE OBESITY DUE TO EXCESS CALORIES WITH SERIOUS COMORBIDITY AND BODY MASS INDEX (BMI) OF 40.0 TO 44.9 IN ADULT: ICD-10-CM

## 2025-02-14 RX ORDER — ISOSORBIDE MONONITRATE 30 MG/1
30 TABLET, EXTENDED RELEASE ORAL EVERY MORNING
Qty: 90 TABLET | Refills: 3 | Status: SHIPPED | OUTPATIENT
Start: 2025-02-14

## 2025-02-14 NOTE — TELEPHONE ENCOUNTER
After discussing with Dr. Schmidt, called patient with stress test results.     Patient to continue aspirin, statin, beta blocker    Added isosorbide.     Please schedule cardiac catheterization when Dr. Schmidt returns.     Orders placed.

## 2025-02-18 NOTE — TELEPHONE ENCOUNTER
Called and scheduled patient for cath on 3/5. Went over date time and instructions over the phone.

## 2025-03-05 ENCOUNTER — HOSPITAL ENCOUNTER (OUTPATIENT)
Facility: HOSPITAL | Age: 70
Setting detail: HOSPITAL OUTPATIENT SURGERY
Discharge: HOME | End: 2025-03-05
Attending: INTERNAL MEDICINE | Admitting: INTERNAL MEDICINE
Payer: MEDICARE

## 2025-03-05 VITALS
WEIGHT: 256.62 LBS | OXYGEN SATURATION: 95 % | TEMPERATURE: 98.3 F | DIASTOLIC BLOOD PRESSURE: 73 MMHG | HEIGHT: 66 IN | BODY MASS INDEX: 41.24 KG/M2 | HEART RATE: 64 BPM | RESPIRATION RATE: 16 BRPM | SYSTOLIC BLOOD PRESSURE: 151 MMHG

## 2025-03-05 DIAGNOSIS — E78.2 MIXED HYPERLIPIDEMIA: ICD-10-CM

## 2025-03-05 DIAGNOSIS — E66.01 CLASS 3 SEVERE OBESITY DUE TO EXCESS CALORIES WITH SERIOUS COMORBIDITY AND BODY MASS INDEX (BMI) OF 40.0 TO 44.9 IN ADULT: ICD-10-CM

## 2025-03-05 DIAGNOSIS — I10 HTN (HYPERTENSION), BENIGN: ICD-10-CM

## 2025-03-05 DIAGNOSIS — Z87.891 HISTORY OF TOBACCO USE: ICD-10-CM

## 2025-03-05 DIAGNOSIS — E66.813 CLASS 3 SEVERE OBESITY DUE TO EXCESS CALORIES WITH SERIOUS COMORBIDITY AND BODY MASS INDEX (BMI) OF 40.0 TO 44.9 IN ADULT: ICD-10-CM

## 2025-03-05 DIAGNOSIS — R94.39 ABNORMAL STRESS TEST: ICD-10-CM

## 2025-03-05 DIAGNOSIS — I25.118 CORONARY ARTERY DISEASE OF NATIVE ARTERY OF NATIVE HEART WITH STABLE ANGINA PECTORIS: ICD-10-CM

## 2025-03-05 DIAGNOSIS — Z98.61 CORONARY ANGIOPLASTY STATUS: ICD-10-CM

## 2025-03-05 DIAGNOSIS — E11.59 TYPE 2 DIABETES MELLITUS WITH OTHER CIRCULATORY COMPLICATION, WITHOUT LONG-TERM CURRENT USE OF INSULIN: ICD-10-CM

## 2025-03-05 LAB
ANION GAP SERPL CALCULATED.3IONS-SCNC: 10 MMOL/L (ref 5–15)
BUN SERPL-MCNC: 12 MG/DL (ref 8–23)
BUN/CREAT SERPL: 11 (ref 7–25)
CALCIUM SPEC-SCNC: 9.6 MG/DL (ref 8.6–10.5)
CHLORIDE SERPL-SCNC: 103 MMOL/L (ref 98–107)
CO2 SERPL-SCNC: 28 MMOL/L (ref 22–29)
CREAT SERPL-MCNC: 1.09 MG/DL (ref 0.76–1.27)
DEPRECATED RDW RBC AUTO: 41.8 FL (ref 37–54)
EGFRCR SERPLBLD CKD-EPI 2021: 74.4 ML/MIN/1.73
ERYTHROCYTE [DISTWIDTH] IN BLOOD BY AUTOMATED COUNT: 12.9 % (ref 12.3–15.4)
GLUCOSE SERPL-MCNC: 106 MG/DL (ref 65–99)
HCT VFR BLD AUTO: 42.1 % (ref 37.5–51)
HGB BLD-MCNC: 13.7 G/DL (ref 13–17.7)
INR PPP: 1.05 (ref 0.9–1.1)
MCH RBC QN AUTO: 29.1 PG (ref 26.6–33)
MCHC RBC AUTO-ENTMCNC: 32.5 G/DL (ref 31.5–35.7)
MCV RBC AUTO: 89.4 FL (ref 79–97)
PLATELET # BLD AUTO: 191 10*3/MM3 (ref 140–450)
PMV BLD AUTO: 10.3 FL (ref 6–12)
POTASSIUM SERPL-SCNC: 4.9 MMOL/L (ref 3.5–5.2)
PROTHROMBIN TIME: 13.6 SECONDS (ref 11.7–14.2)
RBC # BLD AUTO: 4.71 10*6/MM3 (ref 4.14–5.8)
SODIUM SERPL-SCNC: 141 MMOL/L (ref 136–145)
WBC NRBC COR # BLD AUTO: 7.77 10*3/MM3 (ref 3.4–10.8)

## 2025-03-05 PROCEDURE — C1894 INTRO/SHEATH, NON-LASER: HCPCS | Performed by: INTERNAL MEDICINE

## 2025-03-05 PROCEDURE — 85610 PROTHROMBIN TIME: CPT | Performed by: INTERNAL MEDICINE

## 2025-03-05 PROCEDURE — 93458 L HRT ARTERY/VENTRICLE ANGIO: CPT | Performed by: INTERNAL MEDICINE

## 2025-03-05 PROCEDURE — 80048 BASIC METABOLIC PNL TOTAL CA: CPT | Performed by: INTERNAL MEDICINE

## 2025-03-05 PROCEDURE — 99152 MOD SED SAME PHYS/QHP 5/>YRS: CPT | Performed by: INTERNAL MEDICINE

## 2025-03-05 PROCEDURE — 25010000002 MIDAZOLAM PER 1 MG: Performed by: INTERNAL MEDICINE

## 2025-03-05 PROCEDURE — 25510000001 IOPAMIDOL PER 1 ML: Performed by: INTERNAL MEDICINE

## 2025-03-05 PROCEDURE — C1760 CLOSURE DEV, VASC: HCPCS | Performed by: INTERNAL MEDICINE

## 2025-03-05 PROCEDURE — C1769 GUIDE WIRE: HCPCS | Performed by: INTERNAL MEDICINE

## 2025-03-05 PROCEDURE — 25010000002 FENTANYL CITRATE (PF) 100 MCG/2ML SOLUTION: Performed by: INTERNAL MEDICINE

## 2025-03-05 PROCEDURE — 85027 COMPLETE CBC AUTOMATED: CPT | Performed by: INTERNAL MEDICINE

## 2025-03-05 PROCEDURE — 25010000002 LIDOCAINE 1 % SOLUTION: Performed by: INTERNAL MEDICINE

## 2025-03-05 RX ORDER — IOPAMIDOL 755 MG/ML
INJECTION, SOLUTION INTRAVASCULAR
Status: DISCONTINUED | OUTPATIENT
Start: 2025-03-05 | End: 2025-03-05 | Stop reason: HOSPADM

## 2025-03-05 RX ORDER — FLUTICASONE PROPIONATE 50 MCG
2 SPRAY, SUSPENSION (ML) NASAL DAILY PRN
COMMUNITY

## 2025-03-05 RX ORDER — NITROGLYCERIN 0.4 MG/1
0.4 TABLET SUBLINGUAL
Status: DISCONTINUED | OUTPATIENT
Start: 2025-03-05 | End: 2025-03-05 | Stop reason: HOSPADM

## 2025-03-05 RX ORDER — MIDAZOLAM HYDROCHLORIDE 1 MG/ML
INJECTION, SOLUTION INTRAMUSCULAR; INTRAVENOUS
Status: DISCONTINUED | OUTPATIENT
Start: 2025-03-05 | End: 2025-03-05 | Stop reason: HOSPADM

## 2025-03-05 RX ORDER — CETIRIZINE HYDROCHLORIDE 10 MG/1
10 TABLET ORAL AS NEEDED
COMMUNITY

## 2025-03-05 RX ORDER — FENTANYL CITRATE 50 UG/ML
INJECTION, SOLUTION INTRAMUSCULAR; INTRAVENOUS
Status: DISCONTINUED | OUTPATIENT
Start: 2025-03-05 | End: 2025-03-05 | Stop reason: HOSPADM

## 2025-03-05 RX ORDER — LIDOCAINE HYDROCHLORIDE 10 MG/ML
INJECTION, SOLUTION INFILTRATION; PERINEURAL
Status: DISCONTINUED | OUTPATIENT
Start: 2025-03-05 | End: 2025-03-05 | Stop reason: HOSPADM

## 2025-03-05 RX ORDER — SODIUM CHLORIDE 9 MG/ML
3 INJECTION, SOLUTION INTRAVENOUS CONTINUOUS
Status: DISCONTINUED | OUTPATIENT
Start: 2025-03-05 | End: 2025-03-05 | Stop reason: HOSPADM

## 2025-03-05 NOTE — H&P
Patient Care Team:  Kian Weston MD as PCP - General  Kian Weston MD as PCP - Family Medicine  Sigifredo Schmidt MD as Consulting Physician (Cardiology)    Chief complaint here for heart cath    Subjective   Patient with known CAD has been having dyspnea on exertion underwent stress test which is abnormal here for cardiac cath.    History of Present Illness    Mr. John Wilburn has PMH of     #  CAD,h/o MI, multiple PCIs in [place] in the past, NSTEMI and MIGUEL to RCA 6/18/15 and LCX 06/24/2015, cath 7/8/2015 Patent stents in RCA and LCX,Borderline disease in ramus intermedius and moderate disease in proximal LAD     #.  Hypertension,  #  dyslipidemia,  Lipitor intolerant, tolerating rosuvastatin  #.  PLAVIX  RESISTANT  #.  obesity, hyperglycemia with normal hemoglobin A1c, 5.6 on  7/2015  #.   Former smoker quit in 1990  #    positive family history of heart disease in father 53, and mother  #    allergy/intolerance  to Lipitor Darvocet and Vicodin     Patient with known CAD has been having dyspnea on exertion underwent stress test which is abnormal here for cardiac cath..  Patient is complaining of WISE,night sweats.  Patient denies any chest pain but has dyspnea on exertion.  He is not exercising monitor      Patient  had labs done 10/19/2018 which showed cholesterol 130 triglycerides 77 HDL 40 LDL 71 CMP was unremarkable. Labs from 4/5/2019 revealed normal CMP TSH 3.74 cholesterol 132 HDL 45 LDL 67 triglycerides 114.  Labs from 8/8/2020 reveal CMP with a glucose of 111, calcium of 8.3, cholesterol 149 triglycerides 144 HDL low at 35 LDL 50.  Labs from 1/25/2021 reveal cholesterol 103 triglycerides 90 HDL low 34, LDL 46.  A1c from 8/9/2021 was 5.8.  Labs from 2/23/2023 revealed lipid profile with cholesterol 115, triglycerides 100, HDL 32, LDL 64, normal CMP except potassium 5.4 and glucose 132.  AST 48, ALT 69.  Labs from 8/15/2023 reveal A1c of 6.4.  Labs from 3/1/2024 reveal lipid profile with  cholesterol 83, triglycerides 103, HDL 28, LDL 35, CMP with glucose of 110, potassium 5.4..  Labs from 3/5/2024 reveal CMP with a glucose of 106.  Labs from 4/30/2024 reveal hemoglobin A1c of 5.8.  Labs from 10/29/2024 reveal hemoglobin A1c of 5.7.     Patient underwent Lexiscan Cardiolite 2/11/2025 which revealed infrolateral ischemia EF of 66%      ASSESSMENT:     # WISE, possible anginal equivalent, abnormal stress test  # dyslipidemia  #  CAD history of MI and PCI  #  obesity with BMI over 40, glucose intolerance,   # hypertension,       PLAN:     Reviewed stress test results with patient.  Patient's dyspnea on exertion could be anginal equivalent.  Stress test is abnormal.  Will schedule for cardiac cath.  Risk benefits alternatives explained.  Reviewed BMI over 40, counseled on weight loss diet and exercise.  To help with obesity as well as low HDL.  Patient previously was intolerant to Lipitor, but is tolerating Crestor okay.  Will continue Crestor for dyslipidemia.  Reviewed lab results with patient.  Continue aspirin, carvedilol, losartan, Crestor to help with CAD, MI, PCI, hypertension, dyslipidemia.  Patient's LFTs have normalized.  Patient had mild plaque in carotids.  Will continue to monitor.  Patient reportedly had CT abdomen and pelvis in March 2024 which showed diverticulitis.  Reviewed results with patient.  Will follow and consider further evaluation treatment.        Procedures:     Lexiscan Cardiolite 7/29/2021 normal perfusion EF of 70%  Carotid Dopplers 4/6/2023 less than 50%.    AAA screening no evidence of aortic aneurysm              ECG 12 Lead     Date/Time: 1/29/2025 10:13 AM  Performed by: Sigifredo Schmidt MD     Authorized by: Sigifredo Schmidt MD  Comparison: compared with previous ECG from 1/24/2024  Comparison to previous ECG: EKG done today reviewed/interpreted by me reveals sinus rhythm with rate of 61 bpm with no new change compared to EKG from 1/24/2024         Review of Systems   See HPI    Past Medical History:   Diagnosis Date    Acute bronchitis     Acute myocardial infarction, subsequent episode of care     Acute non-recurrent frontal sinusitis     Acute right-sided low back pain with right-sided sciatica     Impression: persistent, long standing back pain, worse, with rle radiculopathy, h/o vertebral fx, refractory to pt xray with severe degenerative changes l5/ s1 check mri, referrall to dr amarilys arias 20 minutes bid nsaids Rehabilitation exercises discussed. continue pt    Allergic rhinitis     Allergy to poison ivy     Impression: Due to the wide spread rash he was started on Prednisone per pt request. He was advised to RTC if his symptoms did not improve.    Annual visit for general adult medical examination with abnormal findings     Impression: doing well, vaccines current Age specific anticipatory guidance and warning signs discussed. Diet, exercise, and lifestyle modification discussed. Safety, seatbelts, and routine screening examinations discussed. Discussed self-examinations.    Bronchitis     Cellulitis of buttock     Impression: possibly 2nd insect bite Topical care discussed. Discussed possible necessity of I and D. Call / return if fever, worsening symptoms.    Chest pain     Impression: atypical, improved / resolved currently possibly 2nd nausea /gi upset /viral uri serial ekg's, troponin's normal followed by cardiology in [place], he reports negative treadmill cardiolyte 12/11 d/c to home, Follow up 2 to 3 days. Follow up with cardiology planned consider repeat stress test if chest pain on exertion, worsening symptoms.    Colon cancer screening     Impression: has a colonoscopy, will get records    Conjunctivitis     Impression: viral Topical care discussed. wash hands frequently    Coronary atherosclerosis     Impression: h/o stent 2003, 2016 followed by [facility]eng, had recent neg stress test, will get records h/o normal carptid doppler per her  report, will get records continue coreg, statin, effient continue risk factor reduction recommend cardiology Follow up he states h3 will consider    COVID-19     Detached retina     GERD (gastroesophageal reflux disease)     Hematoma     Impression: hand, much improved xray neg for fracture per ed Signs and symptoms of infecton discussed. Call / return if worsening symptoms.    Hepatitis B     Impression: h/o, recheck levels    Herpes zoster     Impression: topical care discussed cover with antibiotics Follow up for zostavax    History of tobacco use     HL (hearing loss) 12/28/1979    [employer]    HTN (hypertension), benign     Impression: good control Discussed low sodium diet, lifestyle modification. Follow up recheck    Hyperlipidemia     Impression: followed by Roxana improved tolerating crestor rx, ldl to 71, + aggressive ldl target considering cad recheck    Lumbar disc disease     Impression: followed by Nick Ashton undergoing epidual injxn    Malaise and fatigue     Impression: check vitamin levels h/o low t do not recommend replacement consider age enedelia under good cobtrol consider wellbutrin Follow up recheck Call / return if worsening symptoms.    Myalgia     Impression: possibly 2nd crestor, hold x 1 to 2 mos risk/benefit RX discussed, considering restart possibly at lower dose    Nevus     Impression: path shows benign lentigo Discussed skin care, use of sun block and protective clothing.    Obesity     Obesity, morbid, BMI 40.0-49.9     DOCUMENTATION OF FOLLOW-UP PLAN FOR PATIENT WITH BMI ABOVE NORMAL ()    Overweight (BMI 25.0-29.9)     Impression: Discussed diet, exercise, lifestyle modification. Follow up [id number]    Pruritus     Right hip pain     Impression: check xray    Screening for depression     Negative Depression Screening (4 or less) ()    Screening PSA (prostate specific antigen)     Impression: psa normal / damián normal    Sinusitis, bacterial     Impression: He was prescribed Cipro  and Tessalon perles. Increase fluids. Tylenol/motrin for pain or fever. Medication and medication adverse effects discussed. Follow-up 5-7 days for reevaluation if not improved or sooner if needed.    Skin lesion     Impression: rec resection / derm ref sched    Sleep apnea, obstructive     Story: on CPAP    URI (upper respiratory infection)     Impression: Increase fluid intake. Mucinex Call / return if fever, respiratory difficulty, worsening symptoms.    Vertiginous syndrome and labyrinthine disorder     Impression: Differential diagnosis discussed. Follow-up in 2 weeks if not better. Follow-up sooner for worsening symptoms or for any concerns. Zyrtec as directed.    Vertigo     Impression: likely secondary to sinusitis with eustachian tube dysfunction, rx in progress ddx includes vestibular neuritis, cover with prednisone Follow up recheck Consider imaging if persistent symptoms.    Vitamin D deficiency      Past Surgical History:   Procedure Laterality Date    CATARACT EXTRACTION, BILATERAL Bilateral     CORONARY STENT PLACEMENT      EYE SURGERY  10/16/2019    repair detached retina    NASAL SEPTUM SURGERY       Family History   Problem Relation Age of Onset    Hypertension Mother     Diabetes Mother             Heart disease Mother     Heart attack Father     Rheumatic fever Father     No Known Problems Sister     No Known Problems Brother     No Known Problems Maternal Aunt     No Known Problems Maternal Uncle     No Known Problems Paternal Aunt     No Known Problems Paternal Uncle     No Known Problems Maternal Grandmother     No Known Problems Maternal Grandfather     No Known Problems Paternal Grandmother     No Known Problems Paternal Grandfather     No Known Problems Other     Anemia Neg Hx     Arrhythmia Neg Hx     Asthma Neg Hx     Clotting disorder Neg Hx     Fainting Neg Hx     Heart failure Neg Hx     Hyperlipidemia Neg Hx      Social History     Tobacco Use    Smoking status: Former      Current packs/day: 0.00     Average packs/day: 0.5 packs/day for 15.0 years (7.5 ttl pk-yrs)     Types: Cigarettes     Start date: 1975     Quit date: 1990     Years since quittin.1     Passive exposure: Past    Smokeless tobacco: Never   Vaping Use    Vaping status: Never Used   Substance Use Topics    Alcohol use: Not Currently     Alcohol/week: 2.0 standard drinks of alcohol     Types: 2 Cans of beer per week    Drug use: No     E-cigarette/Vaping    E-cigarette/Vaping Use Never User     Passive Exposure No     Counseling Given No      E-cigarette/Vaping Substances    Nicotine No     THC No     CBD No     Flavoring No      Medications Prior to Admission   Medication Sig Dispense Refill Last Dose/Taking    aspirin 81 MG EC tablet Take 1 tablet by mouth Daily. 90 tablet 3 3/5/2025 at  9:00 AM    carvedilol (COREG) 25 MG tablet TAKE 1 TABLET BY MOUTH TWICE DAILY 180 tablet 3 3/5/2025 at  9:00 AM    cetirizine (zyrTEC) 10 MG tablet Take 1 tablet by mouth As Needed for Allergies or Rhinitis.   Taking As Needed    Cholecalciferol (VITAMIN D) 50 MCG (2000 UT) tablet Take 1 tablet by mouth 2 (two) times a day.   3/5/2025 at  9:00 AM    cyclobenzaprine (FLEXERIL) 10 MG tablet Take 1 tablet by mouth At Night As Needed for Muscle Spasms. 90 tablet 2 Past Week    fluticasone (FLONASE) 50 MCG/ACT nasal spray Administer 2 sprays into the nostril(s) as directed by provider Daily As Needed for Rhinitis or Allergies.   Taking As Needed    isosorbide mononitrate (IMDUR) 30 MG 24 hr tablet Take 1 tablet by mouth Every Morning. 90 tablet 3 3/4/2025 Morning    losartan (COZAAR) 25 MG tablet TAKE 1 TABLET BY MOUTH DAILY 90 tablet 3 3/5/2025 at  9:00 AM    metFORMIN (GLUCOPHAGE) 500 MG tablet TAKE 1 TABLET BY MOUTH DAILY WITH BREAKFAST 90 tablet 3 3/5/2025 at  9:00 AM    nitroglycerin (NITROSTAT) 0.4 MG SL tablet 1 under the tongue as needed for angina, may repeat q5mins for up three doses 25 tablet 1 Taking     "rosuvastatin (CRESTOR) 20 MG tablet Take 1 tablet by mouth Daily. 90 tablet 3 3/5/2025 at  9:00 AM     Allergies:  Vicodin [hydrocodone-acetaminophen], Atorvastatin, and Propoxyphene    Objective      Vital Signs  Temp:  [98.3 °F (36.8 °C)] 98.3 °F (36.8 °C)  Heart Rate:  [61-64] 64  Resp:  [13-16] 16  BP: (153-188)/(78-93) 188/93    Physical Exam    Physical Exam   BP (!) 188/93   Pulse 64   Temp 98.3 °F (36.8 °C) (Oral)   Resp 16   Ht 166.4 cm (65.5\")   Wt 116 kg (256 lb 9.9 oz)   SpO2 98%   BMI 42.05 kg/m²   General:  Appears in no acute distress  Eyes: Sclera is anicteric,  conjunctiva is clear   HEENT:  No JVD. Thyroid not visibly enlarged. No mucosal pallor or cyanosis  Respiratory: Respirations regular and unlabored at rest.  Clear to auscultation  Cardiovascular: S1,S2 Regular rate and rhythm. No murmur, rub or gallop auscultated.  . No pretibial pitting edema  Gastrointestinal: Abdomen nondistended.  Musculoskeletal:  No abnormal movements  Extremities: No digital clubbing or cyanosis  Skin: Color pink.   Neuro: Alert and awake.   Results Review:    I reviewed the patient's new clinical results.      Assessment & Plan       Abnormal stress test    Coronary angioplasty status    History of tobacco use    HTN (hypertension), benign    Hyperlipidemia    Class 3 severe obesity due to excess calories with serious comorbidity and body mass index (BMI) of 40.0 to 44.9 in adult    Coronary artery disease involving native coronary artery of native heart    Type 2 diabetes mellitus with circulatory disorder, without long-term current use of insulin      Assessment & Plan    I discussed the patients findings and my recommendations with patient    Sigifredo Schmidt MD  03/05/25  13:33 EST        "

## 2025-04-14 NOTE — PROGRESS NOTES
Subjective   John Wilburn is a 69 y.o. male.     Chief Complaint   Patient presents with    Medicare Wellness-subsequent    Diabetes    Hyperlipidemia    Hypertension       Diabetes  He presents for his follow-up diabetic visit. He has type 2 diabetes mellitus. Pertinent negatives for hypoglycemia include no headaches, pallor, seizures or sweats. Pertinent negatives for diabetes include no blurred vision, no chest pain, no fatigue, no foot ulcerations, no polydipsia, no polyphagia, no polyuria, no visual change, no weakness and no weight loss. Current diabetic treatment includes oral agent (monotherapy). Meal planning includes avoidance of concentrated sweets. He participates in exercise intermittently. (John checks his blood sugar occasionally if he is having fatigue or concerns of possible blood sugar issues. Reports recently runs around 110s) An ACE inhibitor/angiotensin II receptor blocker is not being taken.   Hyperlipidemia  This is a chronic problem. The current episode started more than 1 year ago. Recent lipid tests were reviewed and are low. There are no known factors aggravating his hyperlipidemia. Associated symptoms include shortness of breath. Pertinent negatives include no chest pain, focal weakness or leg pain. Current antihyperlipidemic treatment includes statins. The current treatment provides mild improvement of lipids. There are no compliance problems.  Risk factors for coronary artery disease include dyslipidemia, hypertension, male sex and obesity.   Hypertension  This is a chronic problem. The current episode started more than 1 year ago. The problem is unchanged. Associated symptoms include palpitations and shortness of breath. Pertinent negatives include no anxiety, blurred vision, chest pain, headaches, malaise/fatigue or sweats. There are no associated agents to hypertension. Risk factors for coronary artery disease include male gender, dyslipidemia and obesity. Past treatments include  lifestyle changes. Current antihypertension treatment includes beta blockers and ACE inhibitors. The current treatment provides moderate improvement. There are no compliance problems.             I personally reviewed and updated the patient's allergies, medications, problem list, and past medical, surgical, social, and family history. I have reviewed and confirmed the accuracy of the History of Present Illness and Review of Symptoms as documented by the MA/LPN/RN. Kian Weston MD    Family History   Problem Relation Age of Onset    Hypertension Mother     Diabetes Mother             Heart disease Mother     Heart attack Father     Rheumatic fever Father     No Known Problems Sister     No Known Problems Brother     No Known Problems Maternal Aunt     No Known Problems Maternal Uncle     No Known Problems Paternal Aunt     No Known Problems Paternal Uncle     No Known Problems Maternal Grandmother     No Known Problems Maternal Grandfather     No Known Problems Paternal Grandmother     No Known Problems Paternal Grandfather     No Known Problems Other     Anemia Neg Hx     Arrhythmia Neg Hx     Asthma Neg Hx     Clotting disorder Neg Hx     Fainting Neg Hx     Heart failure Neg Hx     Hyperlipidemia Neg Hx        Social History     Tobacco Use    Smoking status: Former     Current packs/day: 0.00     Average packs/day: 0.5 packs/day for 15.0 years (7.5 ttl pk-yrs)     Types: Cigarettes     Start date: 1975     Quit date: 1990     Years since quittin.3     Passive exposure: Past    Smokeless tobacco: Never   Vaping Use    Vaping status: Never Used   Substance Use Topics    Alcohol use: Not Currently     Alcohol/week: 2.0 standard drinks of alcohol     Types: 2 Cans of beer per week    Drug use: No       Past Surgical History:   Procedure Laterality Date    CARDIAC CATHETERIZATION N/A 3/5/2025    Procedure: Left Heart Cath, possible pci;  Surgeon: Sigifredo Schmidt MD;  Location: [facility]  CATH INVASIVE LOCATION;  Service: Cardiovascular;  Laterality: N/A;    CATARACT EXTRACTION, BILATERAL Bilateral     CORONARY STENT PLACEMENT  2003    EYE SURGERY  10/16/2019    repair detached retina    NASAL SEPTUM SURGERY         Patient Active Problem List   Diagnosis    Acute myocardial infarction, subsequent episode of care    Acute right-sided low back pain with right-sided sciatica    Allergic rhinitis    Coronary angioplasty status    Edema    Welcome to Medicare preventive visit    Colon cancer screening    Screening PSA (prostate specific antigen)    GERD (gastroesophageal reflux disease)    Hepatitis B    History of tobacco use    HTN (hypertension), benign    Hyperlipidemia    Lumbar disc disease    Vertiginous syndrome and labyrinthine disorder    Nevus    Obstructive sleep apnea    Class 3 severe obesity due to excess calories with serious comorbidity and body mass index (BMI) of 40.0 to 44.9 in adult    Right hip pain    Vitamin D deficiency    Tendinitis of right rotator cuff    Chest pain in adult    Elevated fasting glucose    Coronary artery disease of native artery of native heart with stable angina pectoris    Bradycardia    Screening for malignant neoplasm of colon    History of retinal detachment    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    Type 2 diabetes mellitus with circulatory disorder, without long-term current use of insulin    Abnormal stress test    Other fatigue         Current Outpatient Medications:     aspirin 81 MG EC tablet, Take 1 tablet by mouth Daily., Disp: 90 tablet, Rfl: 3    carvedilol (COREG) 25 MG tablet, TAKE 1 TABLET BY MOUTH TWICE DAILY, Disp: 180 tablet, Rfl: 3    Cholecalciferol (VITAMIN D) 50 MCG (2000 UT) tablet, Take 1 tablet by mouth 2 (two) times a day., Disp: , Rfl:     cyclobenzaprine (FLEXERIL) 10 MG tablet, Take 1 tablet by mouth At Night As Needed for Muscle Spasms., Disp: 90 tablet, Rfl: 2    losartan (COZAAR) 25 MG tablet, TAKE 1 TABLET BY MOUTH DAILY,  Disp: 90 tablet, Rfl: 3    metFORMIN (GLUCOPHAGE) 500 MG tablet, TAKE 1 TABLET BY MOUTH DAILY WITH BREAKFAST, Disp: 90 tablet, Rfl: 3    nitroglycerin (NITROSTAT) 0.4 MG SL tablet, 1 under the tongue as needed for angina, may repeat q5mins for up three doses, Disp: 25 tablet, Rfl: 1    rosuvastatin (CRESTOR) 20 MG tablet, Take 1 tablet by mouth Daily., Disp: 90 tablet, Rfl: 3    amoxicillin-clavulanate (AUGMENTIN) 875-125 MG per tablet, Take 1 tablet by mouth 2 (Two) Times a Day for 15 days., Disp: 30 tablet, Rfl: 0    cetirizine (zyrTEC) 10 MG tablet, Take 1 tablet by mouth As Needed for Allergies or Rhinitis. (Patient not taking: Reported on 4/15/2025), Disp: , Rfl:     fluticasone (FLONASE) 50 MCG/ACT nasal spray, Administer 2 sprays into the nostril(s) as directed by provider Daily As Needed for Rhinitis or Allergies. (Patient not taking: Reported on 4/15/2025), Disp: , Rfl:     isosorbide mononitrate (IMDUR) 30 MG 24 hr tablet, Take 1 tablet by mouth Every Morning. (Patient not taking: Reported on 4/15/2025), Disp: 90 tablet, Rfl: 3          Review of Systems   Constitutional:  Negative for chills, diaphoresis, fatigue, malaise/fatigue and unexpected weight loss.   HENT:  Negative for trouble swallowing and voice change.    Eyes:  Negative for blurred vision and visual disturbance.   Respiratory:  Positive for shortness of breath.    Cardiovascular:  Positive for palpitations. Negative for chest pain.   Gastrointestinal:  Negative for abdominal pain and nausea.   Endocrine: Negative for polydipsia, polyphagia and polyuria.   Genitourinary:  Negative for hematuria.   Musculoskeletal:  Negative for neck stiffness.   Skin:  Negative for color change and pallor.   Allergic/Immunologic: Negative for immunocompromised state.   Neurological:  Negative for focal weakness, seizures, syncope and weakness.   Hematological:  Negative for adenopathy.   Psychiatric/Behavioral:  Negative for hallucinations, sleep disturbance  "and suicidal ideas.        I have reviewed and confirmed the accuracy of the ROS as documented by the MA/LPN/RN Kian Weston MD      Objective   /82 (BP Location: Right arm, Patient Position: Sitting, Cuff Size: Adult)   Pulse 76   Temp 98.4 °F (36.9 °C) (Temporal)   Resp 18   Ht 167.6 cm (66\")   Wt 114 kg (250 lb 6 oz)   SpO2 98% Comment: room air  BMI 40.41 kg/m²   BP Readings from Last 3 Encounters:   04/15/25 134/82   03/05/25 151/73   01/29/25 133/77     Wt Readings from Last 3 Encounters:   04/15/25 114 kg (250 lb 6 oz)   03/05/25 116 kg (256 lb 9.9 oz)   01/29/25 117 kg (258 lb)           Data / Lab Results:    Hemoglobin A1C   Date Value Ref Range Status   04/15/2025 5.8 (A) 4.5 - 5.7 % Final   10/29/2024 5.7 4.5 - 5.7 % Final   04/30/2024 5.8 (A) 4.5 - 5.7 % Final     Lab Results   Component Value Date    Glucose 118 (H) 04/11/2025    Glucose 106 (H) 03/05/2025    Glucose 127 02/14/2022    Glucose, UA Trace (A) 03/05/2024     Lab Results   Component Value Date    LDL 41 04/11/2025    LDL 35 03/01/2024    LDL 64 02/23/2023     Lab Results   Component Value Date    CHOL 114 08/08/2020    CHOL 132 04/05/2019    CHOL 130 10/19/2018     Lab Results   Component Value Date    TRIG 91 04/11/2025    TRIG 103 03/01/2024    TRIG 100 02/23/2023     Lab Results   Component Value Date    HDL 31 (L) 04/11/2025    HDL 28 (L) 03/01/2024    HDL 32 (L) 02/23/2023     Lab Results   Component Value Date    PSA 0.4 04/11/2025    PSA 0.5 03/01/2024    PSA 0.3 02/23/2023     Lab Results   Component Value Date    WBC 7.6 04/11/2025    HGB 14.6 04/11/2025    HCT 44.6 04/11/2025    MCV 90 04/11/2025     04/11/2025     Lab Results   Component Value Date    TSH 1.710 04/11/2025      Lab Results   Component Value Date    GLUCOSE 118 (H) 04/11/2025    BUN 14 04/11/2025    CREATININE 1.05 04/11/2025    EGFRIFNONA 68 01/28/2022    EGFRIFAFRI 79 01/28/2022    BCR 13 04/11/2025    K 4.9 04/11/2025    CO2 25 04/11/2025 " "   CALCIUM 9.5 04/11/2025    ALBUMIN 4.4 04/11/2025    AST 16 04/11/2025    ALT 23 04/11/2025     No results found for: \"MAT\", \"RF\", \"SEDRATE\"   No results found for: \"CRP\"   No results found for: \"IRON\", \"TIBC\", \"FERRITIN\"   Lab Results   Component Value Date    CPQOUJNY11 481 04/03/2018            Physical Exam  Constitutional:       Appearance: Normal appearance. He is well-developed. He is not diaphoretic.   HENT:      Head: Normocephalic and atraumatic.      Right Ear: Tympanic membrane, ear canal and external ear normal.      Left Ear: Tympanic membrane, ear canal and external ear normal.      Nose: Nose normal.      Mouth/Throat:      Mouth: Mucous membranes are moist.   Eyes:      General: Lids are normal.      Extraocular Movements: Extraocular movements intact.      Conjunctiva/sclera: Conjunctivae normal.      Pupils: Pupils are equal, round, and reactive to light.   Neck:      Thyroid: No thyromegaly.      Vascular: No carotid bruit or JVD.      Trachea: No tracheal deviation.   Cardiovascular:      Rate and Rhythm: Normal rate and regular rhythm.      Heart sounds: Normal heart sounds. No murmur heard.     No friction rub. No gallop.   Pulmonary:      Effort: Pulmonary effort is normal.      Breath sounds: Normal breath sounds. No stridor. No decreased breath sounds, wheezing or rales.   Abdominal:      General: Bowel sounds are normal. There is no distension.      Palpations: Abdomen is soft. There is no mass.      Tenderness: There is no abdominal tenderness in the left lower quadrant. There is no guarding or rebound.      Hernia: No hernia is present.      Comments: Mild left lower quadrant tenderness   Lymphadenopathy:      Head:      Right side of head: No submental, submandibular, tonsillar, preauricular, posterior auricular or occipital adenopathy.      Left side of head: No submental, submandibular, tonsillar, preauricular, posterior auricular or occipital adenopathy.      Cervical: No cervical " adenopathy.   Skin:     General: Skin is warm and dry.      Coloration: Skin is not pale.   Neurological:      Mental Status: He is alert and oriented to person, place, and time.      Cranial Nerves: No cranial nerve deficit.      Sensory: No sensory deficit.      Coordination: Coordination normal.      Gait: Gait normal.      Deep Tendon Reflexes: Reflexes are normal and symmetric.           Assessment & Plan      Medications        Problem List         LOS  Health maintenance.   doing well, vaccines current.  Recommend update tetanus vaccine at the pharmacy.  Baby aspirin daily.  Discussed health maintenance, screening test, lifestyle modification.  AAA ultrasound scheduled.  Coronary artery disease.  Stable, followed by cardiology Dr. escobedo.  With abnormal stress test 1/25 but subsequent heart cath 3/25 with stable, nonobstructive CAD, patent.  History of repeat stenting 2015.  Cardiac stress testing benign 2021.  Continue risk factor reduction.  Remote history of carotid Dopplers, recommend repeat, he declined secondary to cost but states he will consider in future.  Hypertension.  Good control.  Tolerating carvedilol.  Discussed low-sodium diet.  Type 2 diabetes.  New onset, improved today, has been working on diet, A1c to 5.8.  Tolerating metformin.  Discussed diet, exercise and lifestyle modification, follow-up recheck A1c.  He is working on diet, has lost weight.  Recommend add Ozempic he declines.  Hyperlipidemia.  Improved on Crestor.  Plans to start weight watchers, follow-up recheck fasting labs.  Retinal detachment.  December/2019.  Improved status post surgery.  Followed by ophthalmology.  Lumbar disc disease.  Flare currently/lumbar muscle sprain, no radiculopathy.  Continue muscle relaxants, rehabilitation exercises discussed, consider PT.  Start prednisone.  Follow-up recheck.  Consider imaging if persistent symptoms.  Followed by pain management/neurosurgery, has had repeat imagery..  s/p course  epidural, consider radiofrequency ablation.  Prostate screening.  Follow-up check PSA.  GERD.  Stable on PPI.  EGD benign/dilation only 2019.  Colon cancer screening.  Colonoscopy benign 2019.  Repeat eval 10 years.  IRENE.  Stable on CPAP.  Rotator cuff tendinitis.    Right.  Much improved today status post injection.  Rehabilitation exercises discussed.  Consider imaging, Ortho referral if persistent symptoms.  Elevated fasting blood sugar.  Discussed diet, exercise lifestyle modification.  Improved today, A1c to 6.4.  Discussed diet, exercise [place] modification.  Follow-up 3 to 4 months.  Plan start Rx if persistent elevation.  Vocal tic.  Mild, infrequent symptoms currently.  Consider guanfacine.  Tremor.  Intermittent, benign exam today.  TSH normal.  Possibly secondary to benign physiologic tremor.  Follow-up recheck.  Consider further eval if worsening symptoms.  OA left shoulder.  Ice, Tylenol, rehabilitation exercise discussed.  Continue as needed nightly muscle relaxant.  Consider imaging if persistent symptoms.  Joint injection declined.  Carpal tunnel.  Improved with nightly bracing.  COVID-19 vaccine status: Vaccinated.  Recommend booster.   Lateral epicondylitis.  Improved today status post injection. Ice, rehabilitation exercise discussed.  Counterforce bracing.  Call return if persistent symptoms.  COVID-19.  With cough, fever, no respiratory difficulty.  Has been vaccinated.  Significant bacterial sinusitis on exam, start antibiotics/prednisone/antivirals.  Continue coated baby aspirin daily.  Quarantine.  Signs symptom progression severe disease discussed, call or return if worsening symptoms.  Diverticulitis.  Overall clinically improved having some persistent, intermittent symptoms repeat course and antibiotic, increase fluid, fiber today, confirmed per CT, CBC normal.  Consider repeat course antibiotics if persistent symptoms.  Recommend follow-up colonoscopy [facility] referral rescheduled, history of  previous colonoscopy per Dr. Kinsey.  Possible necessity admission for IV antibiotics discussed.  Call return if fever, unable keep fluids down, worsening symptoms.  Fatigue.  Diverticulitis contributing, Rx in progress.  Check further blood work.  DDx includes secondary to IRENE, on CPAP/well-controlled, considering Wellbutrin.  Follow-up recheck.  Call or return if worsening or persistent symptoms.      Diagnoses and all orders for this visit:    1. Medicare annual wellness visit, subsequent (Primary)    2. Type 2 diabetes mellitus with other circulatory complication, without long-term current use of insulin  -     POC Glycosylated Hemoglobin (Hb A1C)  -     CBC & Differential    3. Essential hypertension  -     CBC & Differential    4. Dyslipidemia  -     T4  -     Vitamin B12  -     Folate  -     C-reactive Protein  -     CBC & Differential    5. Class 3 severe obesity due to excess calories with serious comorbidity and body mass index (BMI) of 40.0 to 44.9 in adult  Assessment & Plan:  Patient's (Body mass index is 40.41 kg/m².) indicates that they are morbidly/severely obese (BMI > 40 or > 35 with obesity - related health condition) with health conditions that include obstructive sleep apnea, hypertension, coronary heart disease, diabetes mellitus, dyslipidemias, and GERD . Weight is unchanged. BMI  is above average; BMI management plan is completed. We discussed portion control and increasing exercise.       6. History of tobacco use    7. Screening PSA (prostate specific antigen)    8. Screening for malignant neoplasm of colon  -     Ambulatory Referral to Gastroenterology    9. Diverticulitis  -     amoxicillin-clavulanate (AUGMENTIN) 875-125 MG per tablet; Take 1 tablet by mouth 2 (Two) Times a Day for 15 days.  Dispense: 30 tablet; Refill: 0  -     Ambulatory Referral to Gastroenterology    10. Vitamin D deficiency  -     Vitamin D,25-Hydroxy    11. Other fatigue    12. Coronary artery disease of native  artery of native heart with stable angina pectoris            Expected course, medications, and adverse effects discussed.  Call or return if worsening or persistent symptoms.  I wore protective equipment throughout this patient encounter including a mask, gloves, and eye protection.  Hand hygiene was performed before donning protective equipment and after removal when leaving the room.  The complete contents of the Assessment and Plan and Data/Labs Results as documented above have been reviewed and addressed by myself with the patient today as part of an ongoing evaluation / treatment plan.  If some of the documentation has been copied from a previous note and is unchanged it indicates that this problem / plan has been assessed today but is stable from a previous visit and no changes have been recommended.

## 2025-04-14 NOTE — PROGRESS NOTES
Subjective   John Wilburn is a 69 y.o. male.     No chief complaint on file.      The patient is here: to discuss health maintenance and disease prevention to follow up on multiple medical problems.  Last comprehensive physical was on 3/5/2024.  Previous physical was performed by  Kian Weston MD  Overall has: moderate activity with work/home activities, exercises 2 - 3 times per week, good appetite, feels well with minor complaints, decreased energy level, and is sleeping well. Nutrition: appropriate balanced diet and balanced diet. Last tetanus shot was unknown.     History of Present Illness     Recent Hospitalizations:  Recently treated at the following:  [facility] .  [facility]    Patient Care Team:  Kian Weston MD as PCP - General  Kian Weston MD as PCP - Family Medicine  Sigifredo Schmidt MD as Consulting Physician (Cardiology)     I personally reviewed and updated the patient's allergies, medications, problem list, and past medical, surgical, social, and family history. I have reviewed and confirmed the accuracy of the HPI and ROS as documented by the MA/LPN/RN Tessie Davis MA      Family History   Problem Relation Age of Onset    Hypertension Mother     Diabetes Mother             Heart disease Mother     Heart attack Father     Rheumatic fever Father     No Known Problems Sister     No Known Problems Brother     No Known Problems Maternal Aunt     No Known Problems Maternal Uncle     No Known Problems Paternal Aunt     No Known Problems Paternal Uncle     No Known Problems Maternal Grandmother     No Known Problems Maternal Grandfather     No Known Problems Paternal Grandmother     No Known Problems Paternal Grandfather     No Known Problems Other     Anemia Neg Hx     Arrhythmia Neg Hx     Asthma Neg Hx     Clotting disorder Neg Hx     Fainting Neg Hx     Heart failure Neg Hx     Hyperlipidemia Neg Hx        Social History     Tobacco Use    Smoking status: Former      Current packs/day: 0.00     Average packs/day: 0.5 packs/day for 15.0 years (7.5 ttl pk-yrs)     Types: Cigarettes     Start date: 1975     Quit date: 1990     Years since quittin.3     Passive exposure: Past    Smokeless tobacco: Never   Vaping Use    Vaping status: Never Used   Substance Use Topics    Alcohol use: Not Currently     Alcohol/week: 2.0 standard drinks of alcohol     Types: 2 Cans of beer per week    Drug use: No       Past Surgical History:   Procedure Laterality Date    CARDIAC CATHETERIZATION N/A 3/5/2025    Procedure: Left Heart Cath, possible pci;  Surgeon: Sigifredo Schmidt MD;  Location: [facility] CATH INVASIVE LOC[facility];  Service: Cardiovascular;  Laterality: N/A;    CATARACT EXTRACTION, BILATERAL Bilateral     CORONARY STENT PLACEMENT      EYE SURGERY  10/16/2019    repair detached retina    NASAL SEPTUM SURGERY         Patient Active Problem List   Diagnosis    Acute myocardial infarction, subsequent episode of care    Acute right-sided low back pain with right-sided sciatica    Allergic rhinitis    Coronary angioplasty status    Edema    Welcome to Medicare preventive visit    Colon cancer screening    Screening PSA (prostate specific antigen)    GERD (gastroesophageal reflux disease)    Hepatitis B    History of tobacco use    HTN (hypertension), benign    Hyperlipidemia    Lumbar disc disease    Vertiginous syndrome and labyrinthine disorder    Nevus    Obstructive sleep apnea    Class 3 severe obesity due to excess calories with serious comorbidity and body mass index (BMI) of 40.0 to 44.9 in adult    Right hip pain    Vitamin D deficiency    Tendinitis of right rotator cuff    Chest pain in adult    Elevated fasting glucose    Coronary artery disease of native artery of native heart with stable angina pectoris    Bradycardia    Screening for malignant neoplasm of colon    History of retinal detachment    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    Type 2 diabetes  mellitus with circulatory disorder, without long-term current use of insulin    Abnormal stress test         Current Outpatient Medications:     aspirin 81 MG EC tablet, Take 1 tablet by mouth Daily., Disp: 90 tablet, Rfl: 3    carvedilol (COREG) 25 MG tablet, TAKE 1 TABLET BY MOUTH TWICE DAILY, Disp: 180 tablet, Rfl: 3    cetirizine (zyrTEC) 10 MG tablet, Take 1 tablet by mouth As Needed for Allergies or Rhinitis., Disp: , Rfl:     Cholecalciferol (VITAMIN D) 50 MCG (2000 UT) tablet, Take 1 tablet by mouth 2 (two) times a day., Disp: , Rfl:     cyclobenzaprine (FLEXERIL) 10 MG tablet, Take 1 tablet by mouth At Night As Needed for Muscle Spasms., Disp: 90 tablet, Rfl: 2    fluticasone (FLONASE) 50 MCG/ACT nasal spray, Administer 2 sprays into the nostril(s) as directed by provider Daily As Needed for Rhinitis or Allergies., Disp: , Rfl:     isosorbide mononitrate (IMDUR) 30 MG 24 hr tablet, Take 1 tablet by mouth Every Morning., Disp: 90 tablet, Rfl: 3    losartan (COZAAR) 25 MG tablet, TAKE 1 TABLET BY MOUTH DAILY, Disp: 90 tablet, Rfl: 3    metFORMIN (GLUCOPHAGE) 500 MG tablet, TAKE 1 TABLET BY MOUTH DAILY WITH BREAKFAST, Disp: 90 tablet, Rfl: 3    nitroglycerin (NITROSTAT) 0.4 MG SL tablet, 1 under the tongue as needed for angina, may repeat q5mins for up three doses, Disp: 25 tablet, Rfl: 1    rosuvastatin (CRESTOR) 20 MG tablet, Take 1 tablet by mouth Daily., Disp: 90 tablet, Rfl: 3    No opioid medication identified on active medication list. I have reviewed chart for other potential  high risk medication/s and harmful drug interactions in the elderly.          Objective   There were no vitals taken for this visit.  BP Readings from Last 3 Encounters:   03/05/25 151/73   01/29/25 133/77   12/17/24 155/77     Wt Readings from Last 3 Encounters:   03/05/25 116 kg (256 lb 9.9 oz)   01/29/25 117 kg (258 lb)   12/17/24 116 kg (256 lb)       No results found.     Age-appropriate Screening Schedule:  Refer to the  list below for future screening recommendations based on patient's age, sex and/or medical conditions. Orders for these Eula [place]en[place]ro tests are listed in the plan section. The patient has been provided with a written plan.    Health Maintenance   Topic Date Due    URINE MICROALBUMIN-CREATININE RATIO (uACR)  Never done    TDAP/TD VACCINES (1 - Tdap) Never done    ZOSTER VACCINE (1 of 2) Never done    DIABETIC EYE EXAM  10/21/2023    COVID-19 Vaccine (3 - 2024-25 season) 09/01/2024    ANNUAL WELLNESS VISIT  03/05/2025    HEMOGLOBIN A1C  04/29/2025    DIABETIC FOOT EXAM  04/30/2025    INFLUENZA VACCINE  07/01/2025    LIPID PANEL  04/11/2026    COLORECTAL CANCER SCREENING  05/16/2029    HEPATITIS C SCREENING  Completed    Pneumococcal Vaccine 50+  Completed    AAA SCREEN ONCE  Completed       Fall Risk Screen:    PHYLLISADI Fall Risk Assessment has not been completed.    Depression Screen:       3/5/2024     9:57 AM   PHQ-2/PHQ-9 Depression Screening   Retired Little Interest or Pleasure in Doing Things 0-->not at all    Retired Feeling Down, Depressed or Hopeless 0-->not at all    Retired PHQ-9: Brief Depression Severity Measure Score 0        Data saved with a previous flowsheet row definition     I spent more than 16 minutes asking patient questions, counseling and documenting in the chart.    Health Habits and Functional and Cognitive Screening:      3/5/2024    10:00 AM   Functional & Cognitive Status   Do you have difficulty preparing food and eating? No    Do you have difficulty bathing yourself, getting dressed or grooming yourself? No    Do you have difficulty using the toilet? No    Do you have difficulty moving around from place to place? No    Do you have trouble with steps or getting out of a bed or a chair? No   Current Diet Well Balanced Diet   Dental Exam Not up to date   Eye Exam Not up to date   Exercise (times per week) 1 times per week   Current Exercises Include Walking   Do you need help using the  phone?  No   Are you deaf or do you have serious difficulty hearing?  Yes   Do you need help to go to places out of walking distance? No   Do you need help shopping? No   Do you need help preparing meals?  No   Do you need help with housework?  No   Do you need help with laundry? No   Do you need help taking your medications? No   Do you need help managing money? No   Do you ever drive or ride in a car without wearing a seat belt? No   Have you felt unusual stress, anger or loneliness in the last month? No   Who do you live with? Spouse   If you need help, do you have trouble finding someone available to you? No   Do you have difficulty concentrating, remembering or making decisions? No       Data saved with a previous flowsheet row definition       Does the patient have evidence of cognitive impairment? No    Advance Care Planning:  ACP discussion was held with the patient during this visit. Patient does not have an advance directive, declines further assistance.     A face-to-face visit was completed today with patient.  Counseling explanation, and discussion of advanced directives was performed.   The last advanced care visit was performed in 2024.  In a near life ending situation, from which he is not expected to recover functionally, and he is not able to speak for him, he does not want life sustaining measures. We discussed feeding tubes, ventilators and cardiac support as well as dialysis.    I spent more than 16 minutes discussing Advanced Care Planning information and documenting in the chart.    Identification of Risk Factors:  Risk factors include: Advance Directive Discussion  Colon Cancer Screening  Dementia/Memory   Diabetic Lab Screening   Fall Risk  Immunizations Discussed/Encouraged (specific immunizations; Tdap, Influenza, Prevnar 20 (Pneumococcal 20-valent conjugate), Shingrix, COVID19, and RSV (Respiratory Syncytial Virus) )  Obesity/Overweight   Prostate Cancer Screening   Urinary  Incontinence  Dental Screening Recommended; BH AMB DENTAL SCREENING RECOMMENDED:  Vision Screening Recommended;  AMB VISION SCREENING    Compared to one year ago, the patient feels his physical health is the same.  Compared to one year ago, the patient feels his mental health is the same.    Patient Self-Management and Personalized Health Advice  The patient has been provided with information about: diet, exercise, weight management, fall prevention, designing advance directives, supplements, and mental health concerns and preventive services including:   Alcohol Misuse Screening and Counseling  (15 minutes counseling time, Code )  Annual Wellness Visit (AWV)  Bone Density Measurements  Cardiovascular Disease Screening Tests (may do this order every 5 years in beneficiaries without signs or symptoms of cardiovascular disease)  Colorectal Cancer Screening, Fecal Occult Blood Test  Counseling to Prevent Tobacco Use (use of smartset and @cessation@ smartphrase for documentation)  Depression Screening (15 minutes face to face, Code ).      Assessment & Plan      Medications        Problem List         LOS      Diagnoses and all orders for this visit:    1. Medicare annual wellness visit, subsequent (Primary)    2. Type 2 diabetes mellitus with other circulatory complication, without long-term current use of insulin    3. Essential hypertension    4. Dyslipidemia    5. Class 3 severe obesity due to excess calories with serious comorbidity and body mass index (BMI) of 40.0 to 44.9 in adult    6. History of tobacco use    7. Screening PSA (prostate specific antigen)    8. Screening for malignant neoplasm of colon        Medicare wellness.  Discussed health maintenance, routine immunizations, screening tests, lifestyle modification.

## 2025-04-15 ENCOUNTER — OFFICE VISIT (OUTPATIENT)
Dept: FAMILY MEDICINE CLINIC | Facility: CLINIC | Age: 70
End: 2025-04-15
Payer: MEDICARE

## 2025-04-15 VITALS
SYSTOLIC BLOOD PRESSURE: 134 MMHG | TEMPERATURE: 98.4 F | HEIGHT: 66 IN | OXYGEN SATURATION: 98 % | WEIGHT: 250.38 LBS | DIASTOLIC BLOOD PRESSURE: 82 MMHG | HEART RATE: 76 BPM | RESPIRATION RATE: 18 BRPM | BODY MASS INDEX: 40.24 KG/M2

## 2025-04-15 DIAGNOSIS — E11.59 TYPE 2 DIABETES MELLITUS WITH OTHER CIRCULATORY COMPLICATION, WITHOUT LONG-TERM CURRENT USE OF INSULIN: ICD-10-CM

## 2025-04-15 DIAGNOSIS — R53.83 OTHER FATIGUE: ICD-10-CM

## 2025-04-15 DIAGNOSIS — E66.813 CLASS 3 SEVERE OBESITY DUE TO EXCESS CALORIES WITH SERIOUS COMORBIDITY AND BODY MASS INDEX (BMI) OF 40.0 TO 44.9 IN ADULT: ICD-10-CM

## 2025-04-15 DIAGNOSIS — Z87.891 HISTORY OF TOBACCO USE: ICD-10-CM

## 2025-04-15 DIAGNOSIS — E78.5 DYSLIPIDEMIA: ICD-10-CM

## 2025-04-15 DIAGNOSIS — K57.92 DIVERTICULITIS: ICD-10-CM

## 2025-04-15 DIAGNOSIS — E55.9 VITAMIN D DEFICIENCY: ICD-10-CM

## 2025-04-15 DIAGNOSIS — I25.118 CORONARY ARTERY DISEASE OF NATIVE ARTERY OF NATIVE HEART WITH STABLE ANGINA PECTORIS: ICD-10-CM

## 2025-04-15 DIAGNOSIS — I10 ESSENTIAL HYPERTENSION: ICD-10-CM

## 2025-04-15 DIAGNOSIS — Z12.11 SCREENING FOR MALIGNANT NEOPLASM OF COLON: ICD-10-CM

## 2025-04-15 DIAGNOSIS — Z00.00 MEDICARE ANNUAL WELLNESS VISIT, SUBSEQUENT: Primary | ICD-10-CM

## 2025-04-15 DIAGNOSIS — Z12.5 SCREENING PSA (PROSTATE SPECIFIC ANTIGEN): ICD-10-CM

## 2025-04-15 DIAGNOSIS — E66.01 CLASS 3 SEVERE OBESITY DUE TO EXCESS CALORIES WITH SERIOUS COMORBIDITY AND BODY MASS INDEX (BMI) OF 40.0 TO 44.9 IN ADULT: ICD-10-CM

## 2025-04-15 LAB
EXPIRATION DATE: ABNORMAL
HBA1C MFR BLD: 5.8 % (ref 4.5–5.7)
Lab: ABNORMAL

## 2025-04-15 NOTE — ASSESSMENT & PLAN NOTE
Patient's (Body mass index is 40.41 kg/m².) indicates that they are morbidly/severely obese (BMI > 40 or > 35 with obesity - related health condition) with health conditions that include obstructive sleep apnea, hypertension, coronary heart disease, diabetes mellitus, dyslipidemias, and GERD . Weight is unchanged. BMI  is above average; BMI management plan is completed. We discussed portion control and increasing exercise.

## 2025-04-16 LAB
25(OH)D3+25(OH)D2 SERPL-MCNC: 111 NG/ML (ref 30–100)
BASOPHILS # BLD AUTO: 0.1 X10E3/UL (ref 0–0.2)
BASOPHILS NFR BLD AUTO: 1 %
CRP SERPL-MCNC: <1 MG/L (ref 0–10)
EOSINOPHIL # BLD AUTO: 0.1 X10E3/UL (ref 0–0.4)
EOSINOPHIL NFR BLD AUTO: 2 %
ERYTHROCYTE [DISTWIDTH] IN BLOOD BY AUTOMATED COUNT: 12.8 % (ref 11.6–15.4)
FOLATE SERPL-MCNC: 7.6 NG/ML
HCT VFR BLD AUTO: 44.4 % (ref 37.5–51)
HGB BLD-MCNC: 14.7 G/DL (ref 13–17.7)
IMM GRANULOCYTES # BLD AUTO: 0 X10E3/UL (ref 0–0.1)
IMM GRANULOCYTES NFR BLD AUTO: 0 %
LYMPHOCYTES # BLD AUTO: 2.3 X10E3/UL (ref 0.7–3.1)
LYMPHOCYTES NFR BLD AUTO: 33 %
MCH RBC QN AUTO: 29.7 PG (ref 26.6–33)
MCHC RBC AUTO-ENTMCNC: 33.1 G/DL (ref 31.5–35.7)
MCV RBC AUTO: 90 FL (ref 79–97)
MONOCYTES # BLD AUTO: 0.6 X10E3/UL (ref 0.1–0.9)
MONOCYTES NFR BLD AUTO: 9 %
NEUTROPHILS # BLD AUTO: 3.9 X10E3/UL (ref 1.4–7)
NEUTROPHILS NFR BLD AUTO: 55 %
PLATELET # BLD AUTO: 250 X10E3/UL (ref 150–450)
RBC # BLD AUTO: 4.95 X10E6/UL (ref 4.14–5.8)
VIT B12 SERPL-MCNC: 748 PG/ML (ref 232–1245)
WBC # BLD AUTO: 7 X10E3/UL (ref 3.4–10.8)

## 2025-04-17 LAB — T4 SERPL-MCNC: 6.9 UG/DL (ref 4.5–12)

## 2025-05-08 DIAGNOSIS — E78.5 DYSLIPIDEMIA: ICD-10-CM

## 2025-05-09 RX ORDER — LOSARTAN POTASSIUM 25 MG/1
25 TABLET ORAL DAILY
Qty: 90 TABLET | Refills: 3 | Status: SHIPPED | OUTPATIENT
Start: 2025-05-09

## 2025-05-16 ENCOUNTER — TELEPHONE (OUTPATIENT)
Dept: FAMILY MEDICINE CLINIC | Facility: CLINIC | Age: 70
End: 2025-05-16
Payer: MEDICARE

## 2025-05-16 DIAGNOSIS — L23.7 POISON IVY: Primary | ICD-10-CM

## 2025-05-19 RX ORDER — PREDNISONE 5 MG/1
TABLET ORAL
Qty: 45 TABLET | Refills: 0 | Status: SHIPPED | OUTPATIENT
Start: 2025-05-19

## 2025-05-19 NOTE — TELEPHONE ENCOUNTER
That is okay to send a 5 mg prednisone taper, he should let me know if he does not improve, thanks

## 2025-06-25 DIAGNOSIS — I10 ESSENTIAL HYPERTENSION: ICD-10-CM

## 2025-06-25 RX ORDER — CARVEDILOL 25 MG/1
25 TABLET ORAL 2 TIMES DAILY
Qty: 180 TABLET | Refills: 3 | Status: SHIPPED | OUTPATIENT
Start: 2025-06-25

## (undated) DEVICE — SHT AIR TRANSFR COMFRT GLIDE LAT 40X80IN

## (undated) DEVICE — PINNACLE INTRODUCER SHEATH: Brand: PINNACLE

## (undated) DEVICE — CATH DIAG IMPULSE FR4 6F 100CM

## (undated) DEVICE — PK TRY HEART CATH 50

## (undated) DEVICE — CATH DIAG IMPULSE PIG .056 6F 110CM

## (undated) DEVICE — ELECTRD DEFIB M/FUNC PROPADZ RADIOL 2PK

## (undated) DEVICE — CATH DIAG IMPULSE FL4 6F 100CM

## (undated) DEVICE — DGW .035 FC J3MM 150CM TEF HEP: Brand: EMERALD

## (undated) DEVICE — MYNXGRIP 6F/7F: Brand: MYNXGRIP